# Patient Record
Sex: FEMALE | HISPANIC OR LATINO
[De-identification: names, ages, dates, MRNs, and addresses within clinical notes are randomized per-mention and may not be internally consistent; named-entity substitution may affect disease eponyms.]

---

## 2017-04-22 ENCOUNTER — HOSPITAL (OUTPATIENT)
Dept: OTHER | Age: 47
End: 2017-04-22
Attending: OBSTETRICS & GYNECOLOGY

## 2019-05-18 ENCOUNTER — HOSPITAL ENCOUNTER (EMERGENCY)
Facility: HOSPITAL | Age: 49
Discharge: HOME | End: 2019-05-18
Attending: EMERGENCY MEDICINE
Payer: COMMERCIAL

## 2019-05-18 ENCOUNTER — OFFICE VISIT (OUTPATIENT)
Dept: ENDOCRINOLOGY CLINIC | Facility: CLINIC | Age: 49
End: 2019-05-18
Payer: COMMERCIAL

## 2019-05-18 ENCOUNTER — APPOINTMENT (OUTPATIENT)
Dept: GENERAL RADIOLOGY | Facility: HOSPITAL | Age: 49
End: 2019-05-18
Attending: EMERGENCY MEDICINE
Payer: COMMERCIAL

## 2019-05-18 ENCOUNTER — TELEPHONE (OUTPATIENT)
Dept: ENDOCRINOLOGY CLINIC | Facility: CLINIC | Age: 49
End: 2019-05-18

## 2019-05-18 VITALS
DIASTOLIC BLOOD PRESSURE: 101 MMHG | SYSTOLIC BLOOD PRESSURE: 146 MMHG | HEART RATE: 108 BPM | BODY MASS INDEX: 36.8 KG/M2 | WEIGHT: 200 LBS | HEIGHT: 62 IN

## 2019-05-18 VITALS
RESPIRATION RATE: 18 BRPM | HEART RATE: 96 BPM | TEMPERATURE: 99 F | DIASTOLIC BLOOD PRESSURE: 101 MMHG | HEIGHT: 62 IN | WEIGHT: 200 LBS | SYSTOLIC BLOOD PRESSURE: 162 MMHG | BODY MASS INDEX: 36.8 KG/M2 | OXYGEN SATURATION: 97 %

## 2019-05-18 DIAGNOSIS — N30.90 CYSTITIS: ICD-10-CM

## 2019-05-18 DIAGNOSIS — Z79.4 TYPE 2 DIABETES MELLITUS WITH COMPLICATION, WITH LONG-TERM CURRENT USE OF INSULIN (HCC): Primary | ICD-10-CM

## 2019-05-18 DIAGNOSIS — I10 HYPERTENSION, UNSPECIFIED TYPE: ICD-10-CM

## 2019-05-18 DIAGNOSIS — R73.9 HYPERGLYCEMIA: Primary | ICD-10-CM

## 2019-05-18 DIAGNOSIS — E11.8 TYPE 2 DIABETES MELLITUS WITH COMPLICATION, WITH LONG-TERM CURRENT USE OF INSULIN (HCC): Primary | ICD-10-CM

## 2019-05-18 PROBLEM — E11.9 TYPE 2 DIABETES MELLITUS (HCC): Status: ACTIVE | Noted: 2019-05-18

## 2019-05-18 PROCEDURE — 96374 THER/PROPH/DIAG INJ IV PUSH: CPT

## 2019-05-18 PROCEDURE — 96361 HYDRATE IV INFUSION ADD-ON: CPT

## 2019-05-18 PROCEDURE — 82962 GLUCOSE BLOOD TEST: CPT | Performed by: INTERNAL MEDICINE

## 2019-05-18 PROCEDURE — 83036 HEMOGLOBIN GLYCOSYLATED A1C: CPT | Performed by: INTERNAL MEDICINE

## 2019-05-18 PROCEDURE — 87147 CULTURE TYPE IMMUNOLOGIC: CPT | Performed by: EMERGENCY MEDICINE

## 2019-05-18 PROCEDURE — 82962 GLUCOSE BLOOD TEST: CPT

## 2019-05-18 PROCEDURE — 96372 THER/PROPH/DIAG INJ SC/IM: CPT

## 2019-05-18 PROCEDURE — 80048 BASIC METABOLIC PNL TOTAL CA: CPT | Performed by: EMERGENCY MEDICINE

## 2019-05-18 PROCEDURE — 80076 HEPATIC FUNCTION PANEL: CPT | Performed by: EMERGENCY MEDICINE

## 2019-05-18 PROCEDURE — 99285 EMERGENCY DEPT VISIT HI MDM: CPT

## 2019-05-18 PROCEDURE — 81001 URINALYSIS AUTO W/SCOPE: CPT | Performed by: EMERGENCY MEDICINE

## 2019-05-18 PROCEDURE — 85025 COMPLETE CBC W/AUTO DIFF WBC: CPT | Performed by: EMERGENCY MEDICINE

## 2019-05-18 PROCEDURE — 36416 COLLJ CAPILLARY BLOOD SPEC: CPT | Performed by: INTERNAL MEDICINE

## 2019-05-18 PROCEDURE — 71045 X-RAY EXAM CHEST 1 VIEW: CPT | Performed by: EMERGENCY MEDICINE

## 2019-05-18 PROCEDURE — 87086 URINE CULTURE/COLONY COUNT: CPT | Performed by: EMERGENCY MEDICINE

## 2019-05-18 PROCEDURE — 99214 OFFICE O/P EST MOD 30 MIN: CPT | Performed by: INTERNAL MEDICINE

## 2019-05-18 RX ORDER — FUROSEMIDE 10 MG/ML
40 INJECTION INTRAMUSCULAR; INTRAVENOUS ONCE
Status: COMPLETED | OUTPATIENT
Start: 2019-05-18 | End: 2019-05-18

## 2019-05-18 RX ORDER — SODIUM CHLORIDE 9 MG/ML
125 INJECTION, SOLUTION INTRAVENOUS CONTINUOUS
Status: DISCONTINUED | OUTPATIENT
Start: 2019-05-18 | End: 2019-05-18

## 2019-05-18 RX ORDER — NITROFURANTOIN 25; 75 MG/1; MG/1
100 CAPSULE ORAL 2 TIMES DAILY
Qty: 10 CAPSULE | Refills: 0 | Status: SHIPPED | OUTPATIENT
Start: 2019-05-18 | End: 2019-05-23

## 2019-05-18 RX ORDER — INSULIN ASPART 100 [IU]/ML
0.2 INJECTION, SOLUTION INTRAVENOUS; SUBCUTANEOUS ONCE
Status: COMPLETED | OUTPATIENT
Start: 2019-05-18 | End: 2019-05-18

## 2019-05-18 RX ORDER — HYDROCHLOROTHIAZIDE 25 MG/1
25 TABLET ORAL DAILY
Qty: 30 TABLET | Refills: 0 | Status: SHIPPED | OUTPATIENT
Start: 2019-05-18 | End: 2019-06-15

## 2019-05-18 NOTE — ED PROVIDER NOTES
Patient Seen in: [facility] AND [facility] Emergency Department    History   Patient presents with:  Hyperglycemia (metabolic)    Stated Complaint: hyperglycemia    HPI    Patient is a 65-year-old female with a history of diabetes.   She states that she and her f Nose: Nose normal.   Mouth/Throat: Oropharynx is clear and moist.   Eyes: Conjunctivae and EOM are normal. Pupils are equal, round, and reactive to light. Neck: Neck supple.    Cardiovascular: Normal rate, regular rhythm, normal heart sounds and intact components:    AST 10 (*)     All other components within normal limits   CBC W/ DIFFERENTIAL - Abnormal; Notable for the following components:    RBC 5.38 (*)     HGB 16.4 (*)     All other components within normal limits   REDRAW DIRECT BILIRUBIN (P) - N

## 2019-05-18 NOTE — ED INITIAL ASSESSMENT (HPI)
Patient sent from endo md office for hyperglycemia. Patient complains of bilateral lower extremity swelling.   Blood sugar in MD office 480

## 2019-05-18 NOTE — H&P
New Patient Visit - Diabetes    CONSULT - Reason for Visit:  Diabetes management. Requesting Physician: Self referral    CHIEF COMPLAINT:  Patient presents with:  Diabetes: Type 2. Diagnosed 25 yrs ago. Has not seen Endo before. No PCP.  Recent swelling Frequency: Never      FAMILY HISTORY:   Family History   Problem Relation Age of Onset   • Diabetes Mother    • Heart Disorder Mother    • Diabetes Sister    • Stroke Sister    • Diabetes Brother        ASSESSMENTS:        REVIEW OF SYSTEMS:  Constitutiona understands the importance of glycemic control and the implications of uncontrolled diabetes including Diabetic ketoacidosis and various micro vascular and macrovascular complications. a).  Medications:    I had along discussion with the patient about MD

## 2019-05-20 NOTE — TELEPHONE ENCOUNTER
Glad to know that she is doing better. Let us c/w the same dose for now  Call with Bg on Friday  Based on BG, we will increase insulin or add glipizide or MTF based on how they are doing. Thanks.

## 2019-05-20 NOTE — TELEPHONE ENCOUNTER
Pt states she felt relief ever since leaving hospital. She states her ankles r less swollen \"normal this morning\"    5/20 Bf   5/19 Bf     Pt taking Novolin 70/30 ---> 50, 50 units BID.     RN reviewed w/ pt to take both doses of insulin and a

## 2019-05-31 ENCOUNTER — TELEPHONE (OUTPATIENT)
Dept: ENDOCRINOLOGY CLINIC | Facility: CLINIC | Age: 49
End: 2019-05-31

## 2019-05-31 RX ORDER — GLIPIZIDE 5 MG/1
5 TABLET ORAL
Qty: 30 TABLET | Refills: 2 | Status: SHIPPED | OUTPATIENT
Start: 2019-05-31 | End: 2019-06-15

## 2019-05-31 NOTE — TELEPHONE ENCOUNTER
Spoke with Ceci Hare and discussed message below from provider. She would like to try keeping insulin dose the same 50 units BID, and will add glipizide 5 mg with breakfast. Discussed risk of hypoglycemia when taking glipizide in addition to insulin.  She verbal

## 2019-05-31 NOTE — TELEPHONE ENCOUNTER
Two options, We can increase insulin 70/30 to 55 BID  OR  Keep same doses of insulin and add Glipizide 5 mg with BF only  Patient has financial limitations and glipizide is 4 dollars on [facility] list  I have discussed risk of hypoglycemia with this combinat

## 2019-05-31 NOTE — TELEPHONE ENCOUNTER
BGs 200-252. Checking twice daily. Does not have specific readings with her at work. Confirmed she is compliant with Novolin 70/30 vials 50 units BID.      Ankle's still swollen and painful but does state there is some improvement in symptoms since last off

## 2019-06-15 ENCOUNTER — APPOINTMENT (OUTPATIENT)
Dept: LAB | Facility: HOSPITAL | Age: 49
End: 2019-06-15
Attending: INTERNAL MEDICINE
Payer: COMMERCIAL

## 2019-06-15 ENCOUNTER — OFFICE VISIT (OUTPATIENT)
Dept: ENDOCRINOLOGY CLINIC | Facility: CLINIC | Age: 49
End: 2019-06-15
Payer: COMMERCIAL

## 2019-06-15 VITALS
HEART RATE: 99 BPM | SYSTOLIC BLOOD PRESSURE: 149 MMHG | HEIGHT: 62 IN | DIASTOLIC BLOOD PRESSURE: 93 MMHG | WEIGHT: 205.38 LBS | BODY MASS INDEX: 37.79 KG/M2

## 2019-06-15 DIAGNOSIS — Z79.4 TYPE 2 DIABETES MELLITUS WITH COMPLICATION, WITH LONG-TERM CURRENT USE OF INSULIN (HCC): Primary | ICD-10-CM

## 2019-06-15 DIAGNOSIS — Z79.4 TYPE 2 DIABETES MELLITUS WITH COMPLICATION, WITH LONG-TERM CURRENT USE OF INSULIN (HCC): ICD-10-CM

## 2019-06-15 DIAGNOSIS — Z13.220 LIPID SCREENING: ICD-10-CM

## 2019-06-15 DIAGNOSIS — E11.8 TYPE 2 DIABETES MELLITUS WITH COMPLICATION, WITH LONG-TERM CURRENT USE OF INSULIN (HCC): Primary | ICD-10-CM

## 2019-06-15 DIAGNOSIS — E11.8 TYPE 2 DIABETES MELLITUS WITH COMPLICATION, WITH LONG-TERM CURRENT USE OF INSULIN (HCC): ICD-10-CM

## 2019-06-15 PROCEDURE — 82962 GLUCOSE BLOOD TEST: CPT | Performed by: INTERNAL MEDICINE

## 2019-06-15 PROCEDURE — 82043 UR ALBUMIN QUANTITATIVE: CPT

## 2019-06-15 PROCEDURE — 36415 COLL VENOUS BLD VENIPUNCTURE: CPT

## 2019-06-15 PROCEDURE — 36416 COLLJ CAPILLARY BLOOD SPEC: CPT | Performed by: INTERNAL MEDICINE

## 2019-06-15 PROCEDURE — 83036 HEMOGLOBIN GLYCOSYLATED A1C: CPT | Performed by: INTERNAL MEDICINE

## 2019-06-15 PROCEDURE — 82570 ASSAY OF URINE CREATININE: CPT

## 2019-06-15 PROCEDURE — 80061 LIPID PANEL: CPT

## 2019-06-15 PROCEDURE — 80048 BASIC METABOLIC PNL TOTAL CA: CPT

## 2019-06-15 PROCEDURE — 99214 OFFICE O/P EST MOD 30 MIN: CPT | Performed by: INTERNAL MEDICINE

## 2019-06-15 RX ORDER — GLIPIZIDE 10 MG/1
10 TABLET ORAL
Qty: 180 TABLET | Refills: 0 | Status: SHIPPED | OUTPATIENT
Start: 2019-06-15 | End: 2019-10-05

## 2019-06-15 RX ORDER — HYDROCHLOROTHIAZIDE 25 MG/1
25 TABLET ORAL DAILY
Qty: 90 TABLET | Refills: 0 | Status: SHIPPED | OUTPATIENT
Start: 2019-06-15 | End: 2019-10-05

## 2019-06-15 NOTE — PROGRESS NOTES
Return Office Visit     CHIEF COMPLAINT:  Patient presents with:  Diabetes: LE swelling improved, checking BG twice daily       HISTORY OF PRESENT ILLNESS:  Jessica Friedman is a 54year old female who presents for follow up for  Type 2 DM.       DM HIS for:  chest pain, palpitations, orthopnea  GI: Negative for:  abdominal pain, nausea, vomiting, diarrhea, constipation, bleeding  Neurology: Negative for: headache, numbness, weakness  Genito-Urinary: Negative for: dysuria, frequency  Psychiatric: Negative explained  e). BG log maintainence explained in great detail, to get log and glucometer on next visit. f).  Life style changes: Diet: low carbohydrate diet discussed, Exercise: should target a weight loss of 7% and increase exercise to at least 150min a we

## 2019-06-17 ENCOUNTER — TELEPHONE (OUTPATIENT)
Dept: ENDOCRINOLOGY CLINIC | Facility: CLINIC | Age: 49
End: 2019-06-17

## 2019-06-17 RX ORDER — ATORVASTATIN CALCIUM 40 MG/1
40 TABLET, FILM COATED ORAL NIGHTLY
Qty: 30 TABLET | Refills: 3 | Status: SHIPPED | OUTPATIENT
Start: 2019-06-17

## 2019-06-17 NOTE — TELEPHONE ENCOUNTER
RN spoke with patient about note below from provider. Patient verbalized understanding and will start Atorvastatin. She states BGs are \"better\". Med ordered per AM written.

## 2019-06-17 NOTE — TELEPHONE ENCOUNTER
GFR is normal  UR MA is high, we will continue to work on BG control and BP control  Also, LDL is high. Low fat  Diet and start a statin. Atorvastatin 40 mg daily. Please discuss SE of muscle aches and effect on liver enzymes. How are her BG doing?   Pablo Bond

## 2019-10-07 RX ORDER — GLIPIZIDE 10 MG/1
TABLET ORAL
Qty: 180 TABLET | Refills: 0 | Status: SHIPPED | OUTPATIENT
Start: 2019-10-07

## 2019-10-07 RX ORDER — HYDROCHLOROTHIAZIDE 25 MG/1
TABLET ORAL
Qty: 90 TABLET | Refills: 0 | Status: SHIPPED | OUTPATIENT
Start: 2019-10-07

## 2023-11-22 ENCOUNTER — OFFICE VISIT (OUTPATIENT)
Facility: CLINIC | Age: 53
End: 2023-11-22
Payer: MEDICARE

## 2023-11-22 VITALS
HEART RATE: 91 BPM | HEIGHT: 62 IN | WEIGHT: 185 LBS | BODY MASS INDEX: 34.04 KG/M2 | DIASTOLIC BLOOD PRESSURE: 90 MMHG | OXYGEN SATURATION: 97 % | SYSTOLIC BLOOD PRESSURE: 160 MMHG

## 2023-11-22 DIAGNOSIS — Z79.4 TYPE 2 DIABETES MELLITUS WITH DIABETIC NEUROPATHY, WITH LONG-TERM CURRENT USE OF INSULIN (HCC): Primary | ICD-10-CM

## 2023-11-22 DIAGNOSIS — E11.40 TYPE 2 DIABETES MELLITUS WITH DIABETIC NEUROPATHY, WITH LONG-TERM CURRENT USE OF INSULIN (HCC): Primary | ICD-10-CM

## 2023-11-22 LAB
CARTRIDGE LOT#: ABNORMAL NUMERIC
HEMOGLOBIN A1C: 8.4 % (ref 4.3–5.6)

## 2023-11-22 PROCEDURE — 83036 HEMOGLOBIN GLYCOSYLATED A1C: CPT | Performed by: STUDENT IN AN ORGANIZED HEALTH CARE EDUCATION/TRAINING PROGRAM

## 2023-11-22 PROCEDURE — 99205 OFFICE O/P NEW HI 60 MIN: CPT | Performed by: STUDENT IN AN ORGANIZED HEALTH CARE EDUCATION/TRAINING PROGRAM

## 2023-11-22 NOTE — PATIENT INSTRUCTIONS
Return Visit   [ x ] Physician in 3 months   [  ] In person or video visit  [  ] In person only    [ x ] After visit summary   [ x ] Placed labs/imaging. Labs are to be drawn at [address] and fasting. [  ] Central scheduling # for ultrasound/nuclear med/CT/MRI/DXA  [  ] Directions to 1st floor lab  [  ] Dental clearance form  [  ] Will need authorization for outside records  [ X  ] Give blood sugar log  [ X ] Diabetes Education:    [ ] Carb Aware T2DM (60)   [ ] Carb count refresh T1DM (60)   [ X ] CGM start _____________ (30)   [ ] Pump 101 (57)   [ ] Schedule with Rony Vaz for ___________ (61)   [ ] DMBO (61)    It was great seeing you today! Today we discussed your diabetes:  -Please change your novolin to 65 units with breakfast and 35 units with dinner  -For now, continue to check your blood sugar fasting and prior to meals  -I will apply for a continuous glucose monitor and set you up to see Rony Vaz (she can help you administer the meter and set you up with the clinic)  -I also want you to get labs done. If you can get these done while fasting from midnight the night prior. Once all your labs result, I will keep you updated with next steps  -Your blood pressure was high today. I want you to establish care with a primary doctor.  I will also keep an eye out for your kidney function, and start you on a medication accordingly (either lisinopril, losartan, or thiazide)  -I want you to continue to follow with your eye doctor  -Based on your cholesterol results, I may restart your statin medication   -Lastly, once you have had the continuous meter on for 2 weeks, please send me a message and we can review your glucose readings  -I would like to see you back in 3 months     Take care!  -Dr. Sam Zaldivar

## 2023-11-22 NOTE — TELEPHONE ENCOUNTER
Per Dr. Marlon Cristobal, start process for CGM, preferably [facility]. Can do [facility] pharmacy order or try [facility] for DME. Routed back to Dr. Marlon Cristobal to see if preference.

## 2023-11-24 NOTE — TELEPHONE ENCOUNTER
We can try the order to the pharmacy and 3 would be great. Let me know if you need me to place any orders. Thank you!

## 2023-11-27 RX ORDER — BLOOD-GLUCOSE,RECEIVER,CONT
1 EACH MISCELLANEOUS AS DIRECTED
Qty: 1 EACH | Refills: 0 | Status: SHIPPED | OUTPATIENT
Start: 2023-11-27

## 2023-11-27 RX ORDER — BLOOD-GLUCOSE SENSOR
1 EACH MISCELLANEOUS
Qty: 2 EACH | Refills: 2 | Status: SHIPPED | OUTPATIENT
Start: 2023-11-27

## 2023-11-27 NOTE — TELEPHONE ENCOUNTER
Phoned patient, reviewed we will be sending orders for Claudia 3 to [place]- she chose [address] [place] [address] [address] in [place]. Reviewed may need a prior authorization, we will see if this goes through insurance, if too expensive through pharmacy, may have to go through DME still- patient verbalized understanding. Pended orders for sign off to Dr. Kaiser Lawrence.

## 2023-12-20 RX ORDER — HUMAN INSULIN 100 [USP'U]/ML
INJECTION, SUSPENSION SUBCUTANEOUS
COMMUNITY

## 2023-12-20 ASSESSMENT — ACTIVITIES OF DAILY LIVING (ADL)
SENSORY_SUPPORT_DEVICES: EYEGLASSES
MOBILITY_ASSIST_DEVICES: WHEELCHAIR;FOUR WHEEL WALKER
HISTORY OF FALLING IN THE LAST YEAR (PRIOR TO ADMISSION): NO

## 2023-12-21 ENCOUNTER — ANESTHESIA (OUTPATIENT)
Dept: SURGERY | Age: 53
End: 2023-12-21

## 2023-12-21 ENCOUNTER — ANESTHESIA EVENT (OUTPATIENT)
Dept: SURGERY | Age: 53
End: 2023-12-21

## 2023-12-21 ENCOUNTER — HOSPITAL ENCOUNTER (OUTPATIENT)
Age: 53
Discharge: HOME | End: 2023-12-21
Attending: SPECIALIST | Admitting: SPECIALIST

## 2023-12-21 LAB
GLUCOSE BLDC GLUCOMTR-MCNC: 130 MG/DL (ref 70–99)
HCG SERPL-ACNC: <2 MUNITS/ML

## 2023-12-21 PROCEDURE — 10002800 HB RX 250 W HCPCS: Performed by: SPECIALIST

## 2023-12-21 PROCEDURE — 13000036 HB COMPLEX  CASE S/U + 1ST 15 MIN: Performed by: SPECIALIST

## 2023-12-21 PROCEDURE — 10002803 HB RX 637: Performed by: SPECIALIST

## 2023-12-21 PROCEDURE — 84702 CHORIONIC GONADOTROPIN TEST: CPT | Performed by: SPECIALIST

## 2023-12-21 PROCEDURE — 10002807 HB RX 258: Performed by: SPECIALIST

## 2023-12-21 PROCEDURE — 13000006 HB ANESTHESIA MAC S/U + 1ST 15 MIN: Performed by: SPECIALIST

## 2023-12-21 PROCEDURE — 10002800 HB RX 250 W HCPCS: Performed by: NURSE ANESTHETIST, CERTIFIED REGISTERED

## 2023-12-21 PROCEDURE — 10002801 HB RX 250 W/O HCPCS: Performed by: SPECIALIST

## 2023-12-21 PROCEDURE — 10002803 HB RX 637

## 2023-12-21 PROCEDURE — 10006023 HB SUPPLY 272: Performed by: SPECIALIST

## 2023-12-21 PROCEDURE — 13000007 HB ANESTHESIA MAC EA ADD MINUTE: Performed by: SPECIALIST

## 2023-12-21 PROCEDURE — 82962 GLUCOSE BLOOD TEST: CPT

## 2023-12-21 PROCEDURE — 10002801 HB RX 250 W/O HCPCS: Performed by: NURSE ANESTHETIST, CERTIFIED REGISTERED

## 2023-12-21 PROCEDURE — 13000001 HB PHASE II RECOVERY EA 30 MINUTES: Performed by: SPECIALIST

## 2023-12-21 PROCEDURE — 13000037 HB COMPLEX CASE EACH ADD MINUTE: Performed by: SPECIALIST

## 2023-12-21 RX ORDER — LIDOCAINE HYDROCHLORIDE 20 MG/ML
INJECTION, SOLUTION INFILTRATION; PERINEURAL PRN
Status: DISCONTINUED | OUTPATIENT
Start: 2023-12-21 | End: 2023-12-21

## 2023-12-21 RX ORDER — PHENYLEPHRINE HYDROCHLORIDE 25 MG/ML
1 SOLUTION/ DROPS OPHTHALMIC
Status: COMPLETED | OUTPATIENT
Start: 2023-12-21 | End: 2023-12-21

## 2023-12-21 RX ORDER — TROPICAMIDE 10 MG/ML
1 SOLUTION/ DROPS OPHTHALMIC
Status: COMPLETED | OUTPATIENT
Start: 2023-12-21 | End: 2023-12-21

## 2023-12-21 RX ORDER — CYCLOPENTOLATE HYDROCHLORIDE 10 MG/ML
1 SOLUTION/ DROPS OPHTHALMIC
Status: COMPLETED | OUTPATIENT
Start: 2023-12-21 | End: 2023-12-21

## 2023-12-21 RX ORDER — CHLORHEXIDINE GLUCONATE ORAL RINSE 1.2 MG/ML
15 SOLUTION DENTAL ONCE
Status: COMPLETED | OUTPATIENT
Start: 2023-12-21 | End: 2023-12-21

## 2023-12-21 RX ORDER — SODIUM CHLORIDE 9 MG/ML
INJECTION, SOLUTION INTRAVENOUS CONTINUOUS
Status: DISCONTINUED | OUTPATIENT
Start: 2023-12-21 | End: 2023-12-21 | Stop reason: HOSPADM

## 2023-12-21 RX ORDER — PROPOFOL 10 MG/ML
INJECTION, EMULSION INTRAVENOUS PRN
Status: DISCONTINUED | OUTPATIENT
Start: 2023-12-21 | End: 2023-12-21

## 2023-12-21 RX ORDER — HYDRALAZINE HYDROCHLORIDE 20 MG/ML
INJECTION INTRAMUSCULAR; INTRAVENOUS PRN
Status: DISCONTINUED | OUTPATIENT
Start: 2023-12-21 | End: 2023-12-21

## 2023-12-21 RX ADMIN — CYCLOPENTOLATE HYDROCHLORIDE 1 DROP: 10 SOLUTION/ DROPS OPHTHALMIC at 08:41

## 2023-12-21 RX ADMIN — CHLORHEXIDINE GLUCONATE 15 ML: 1.2 RINSE ORAL at 08:50

## 2023-12-21 RX ADMIN — CYCLOPENTOLATE HYDROCHLORIDE 1 DROP: 10 SOLUTION/ DROPS OPHTHALMIC at 08:35

## 2023-12-21 RX ADMIN — PHENYLEPHRINE HYDROCHLORIDE 1 DROP: 25 SOLUTION/ DROPS OPHTHALMIC at 08:49

## 2023-12-21 RX ADMIN — TROPICAMIDE 1 DROP: 10 SOLUTION/ DROPS OPHTHALMIC at 08:49

## 2023-12-21 RX ADMIN — SODIUM CHLORIDE: 9 INJECTION, SOLUTION INTRAVENOUS at 08:53

## 2023-12-21 RX ADMIN — PHENYLEPHRINE HYDROCHLORIDE 1 DROP: 25 SOLUTION/ DROPS OPHTHALMIC at 08:35

## 2023-12-21 RX ADMIN — HYDRALAZINE HYDROCHLORIDE 10 MG: 20 INJECTION, SOLUTION INTRAMUSCULAR; INTRAVENOUS at 10:13

## 2023-12-21 RX ADMIN — TROPICAMIDE 1 DROP: 10 SOLUTION/ DROPS OPHTHALMIC at 08:41

## 2023-12-21 RX ADMIN — PROPOFOL 30 MG: 10 INJECTION, EMULSION INTRAVENOUS at 10:02

## 2023-12-21 RX ADMIN — PHENYLEPHRINE HYDROCHLORIDE 1 DROP: 25 SOLUTION/ DROPS OPHTHALMIC at 08:41

## 2023-12-21 RX ADMIN — TROPICAMIDE 1 DROP: 10 SOLUTION/ DROPS OPHTHALMIC at 08:35

## 2023-12-21 RX ADMIN — CYCLOPENTOLATE HYDROCHLORIDE 1 DROP: 10 SOLUTION/ DROPS OPHTHALMIC at 08:49

## 2023-12-21 RX ADMIN — LIDOCAINE HYDROCHLORIDE 60 MG: 20 INJECTION, SOLUTION INFILTRATION; PERINEURAL at 10:02

## 2023-12-21 SDOH — SOCIAL STABILITY: SOCIAL INSECURITY: RISK FACTORS: BMI> 30 (OBESITY)

## 2023-12-21 ASSESSMENT — ACTIVITIES OF DAILY LIVING (ADL)
CONTINENCE: INDEPENDENT
HISTORY OF FALLING IN THE LAST YEAR (PRIOR TO ADMISSION): NO
ADL_SHORT_OF_BREATH: NO
RECENT_DECLINE_ADL: NO
TOILETING: INDEPENDENT
BATHING: NEEDS ASSISTANCE
FEEDING: INDEPENDENT
ADL_BEFORE_ADMISSION: NEEDS/REQUIRES ASSISTANCE
TRANSFERRING: INDEPENDENT
DRESSING: NEEDS ASSISTANCE
CHRONIC_PAIN_PRESENT: NO
SENSORY_SUPPORT_DEVICES: EYEGLASSES
ADL_SCORE: 10
MOBILITY_ASSIST_DEVICES: WHEELCHAIR;STANDARD WALKER

## 2023-12-21 ASSESSMENT — PAIN SCALES - GENERAL
PAINLEVEL_OUTOF10: 0

## 2023-12-21 ASSESSMENT — COGNITIVE AND FUNCTIONAL STATUS - GENERAL
ARE YOU DEAF OR DO YOU HAVE SERIOUS DIFFICULTY  HEARING: NO
ARE YOU BLIND OR DO YOU HAVE SERIOUS DIFFICULTY SEEING, EVEN WHEN WEARING GLASSES: NO

## 2023-12-21 ASSESSMENT — ENCOUNTER SYMPTOMS: EXERCISE TOLERANCE: GOOD (>4 METS)

## 2023-12-22 VITALS
RESPIRATION RATE: 19 BRPM | TEMPERATURE: 97.9 F | WEIGHT: 222 LBS | HEIGHT: 62 IN | DIASTOLIC BLOOD PRESSURE: 78 MMHG | BODY MASS INDEX: 40.85 KG/M2 | OXYGEN SATURATION: 96 % | HEART RATE: 88 BPM | SYSTOLIC BLOOD PRESSURE: 148 MMHG

## 2024-02-19 ENCOUNTER — OFFICE VISIT (OUTPATIENT)
Facility: CLINIC | Age: 54
End: 2024-02-19
Payer: MEDICARE

## 2024-02-19 ENCOUNTER — LAB ENCOUNTER (OUTPATIENT)
Dept: LAB | Age: 54
End: 2024-02-19
Attending: STUDENT IN AN ORGANIZED HEALTH CARE EDUCATION/TRAINING PROGRAM
Payer: MEDICARE

## 2024-02-19 VITALS
HEART RATE: 88 BPM | DIASTOLIC BLOOD PRESSURE: 84 MMHG | OXYGEN SATURATION: 97 % | RESPIRATION RATE: 16 BRPM | SYSTOLIC BLOOD PRESSURE: 128 MMHG

## 2024-02-19 DIAGNOSIS — E78.5 HYPERLIPIDEMIA, UNSPECIFIED HYPERLIPIDEMIA TYPE: ICD-10-CM

## 2024-02-19 DIAGNOSIS — Z79.4 TYPE 2 DIABETES MELLITUS WITH DIABETIC NEUROPATHY, WITH LONG-TERM CURRENT USE OF INSULIN (HCC): ICD-10-CM

## 2024-02-19 DIAGNOSIS — I10 HYPERTENSION, UNSPECIFIED TYPE: ICD-10-CM

## 2024-02-19 DIAGNOSIS — E11.40 TYPE 2 DIABETES MELLITUS WITH DIABETIC NEUROPATHY, WITH LONG-TERM CURRENT USE OF INSULIN (HCC): Primary | ICD-10-CM

## 2024-02-19 DIAGNOSIS — E11.40 TYPE 2 DIABETES MELLITUS WITH DIABETIC NEUROPATHY, WITH LONG-TERM CURRENT USE OF INSULIN (HCC): ICD-10-CM

## 2024-02-19 DIAGNOSIS — Z79.4 TYPE 2 DIABETES MELLITUS WITH DIABETIC NEUROPATHY, WITH LONG-TERM CURRENT USE OF INSULIN (HCC): Primary | ICD-10-CM

## 2024-02-19 LAB
ALBUMIN SERPL-MCNC: 3 G/DL (ref 3.4–5)
ALBUMIN/GLOB SERPL: 0.6 {RATIO} (ref 1–2)
ALP LIVER SERPL-CCNC: 111 U/L
ALT SERPL-CCNC: 9 U/L
ANION GAP SERPL CALC-SCNC: 6 MMOL/L (ref 0–18)
AST SERPL-CCNC: 12 U/L (ref 15–37)
BILIRUB SERPL-MCNC: 0.4 MG/DL (ref 0.1–2)
BUN BLD-MCNC: 28 MG/DL (ref 9–23)
CALCIUM BLD-MCNC: 8.2 MG/DL (ref 8.5–10.1)
CHLORIDE SERPL-SCNC: 112 MMOL/L (ref 98–112)
CHOLEST SERPL-MCNC: 186 MG/DL (ref ?–200)
CO2 SERPL-SCNC: 23 MMOL/L (ref 21–32)
CREAT BLD-MCNC: 3.2 MG/DL
EGFRCR SERPLBLD CKD-EPI 2021: 17 ML/MIN/1.73M2 (ref 60–?)
FASTING PATIENT LIPID ANSWER: YES
FASTING STATUS PATIENT QL REPORTED: YES
GLOBULIN PLAS-MCNC: 4.8 G/DL (ref 2.8–4.4)
GLUCOSE BLD-MCNC: 98 MG/DL (ref 70–99)
HDLC SERPL-MCNC: 44 MG/DL (ref 40–59)
LDLC SERPL CALC-MCNC: 124 MG/DL (ref ?–100)
NONHDLC SERPL-MCNC: 142 MG/DL (ref ?–130)
OSMOLALITY SERPL CALC.SUM OF ELEC: 297 MOSM/KG (ref 275–295)
POTASSIUM SERPL-SCNC: 3.3 MMOL/L (ref 3.5–5.1)
PROT SERPL-MCNC: 7.8 G/DL (ref 6.4–8.2)
SODIUM SERPL-SCNC: 141 MMOL/L (ref 136–145)
T4 FREE SERPL-MCNC: 0.8 NG/DL (ref 0.8–1.7)
TRIGL SERPL-MCNC: 100 MG/DL (ref 30–149)
TSI SER-ACNC: 11.1 MIU/ML (ref 0.36–3.74)
VLDLC SERPL CALC-MCNC: 18 MG/DL (ref 0–30)

## 2024-02-19 PROCEDURE — 84443 ASSAY THYROID STIM HORMONE: CPT

## 2024-02-19 PROCEDURE — 84681 ASSAY OF C-PEPTIDE: CPT

## 2024-02-19 PROCEDURE — 80053 COMPREHEN METABOLIC PANEL: CPT

## 2024-02-19 PROCEDURE — 84439 ASSAY OF FREE THYROXINE: CPT

## 2024-02-19 PROCEDURE — 80061 LIPID PANEL: CPT

## 2024-02-19 PROCEDURE — 99215 OFFICE O/P EST HI 40 MIN: CPT | Performed by: STUDENT IN AN ORGANIZED HEALTH CARE EDUCATION/TRAINING PROGRAM

## 2024-02-19 PROCEDURE — 36415 COLL VENOUS BLD VENIPUNCTURE: CPT

## 2024-02-19 RX ORDER — HYDROCHLOROTHIAZIDE 25 MG/1
25 TABLET ORAL DAILY
Qty: 90 TABLET | Refills: 0 | Status: SHIPPED | OUTPATIENT
Start: 2024-02-19 | End: 2024-05-19

## 2024-02-19 NOTE — PATIENT INSTRUCTIONS
Return Visit   [ x ] Physician in 6 months   [  ] In person or video visit  [  ] In person only    [ x ] After visit summary   [ x ] Placed labs/imaging.   (A1c)   [ x ] Diabetes Education:    [ ] Carb Aware T2DM (60)   [ ] Carb count refresh T1DM (60)   [ ] CGM start _____________ (30)   [ ] Pump 101 (60)   [X  ] Schedule with Rodriguez for review darío/check in  (60)   [ ] DMBO (60)    It was great seeing you today!    Today we discussed your diabetes and hypertension:  -Please continue Novolin 50 units prior to meals twice a day  -We reviewed your darío trends  -Once your labs result, I will discuss next steps regarding ozempic, metformin, and/or jardiance (you do have a hx of urinary frequency) pending insurance coverage  -I have also started hydrochlorothiazide for high blood pressure. Please try to establish care with a primary physician. I had given you a list but let me know if you need me to resend it     -Dr. House

## 2024-02-19 NOTE — PROGRESS NOTES
ENDOCRINOLOGY, DIABETES & METABOLISM    Name: Ceci Carroll  MR: [id number]  Date: 11/22/23   Referring Physician: No ref. provider found    Subjective    HISTORY OF PRESENT ILLNESS:  Ceci Carroll is a 53 year oldF  seen in consultation for her Type 2 Diabetes.    Initial HPI 11/2023  Diabetes was diagnosed at age 27.  Initially started on Metformin. She did endorse noncompliance at that time. She was started on insulin around her early 30s.   Has tried lantus, novolog. Currently on Novolin 70/30 50u BID  In 2015, she had a bad infection and stopped all her meds at that time; was hospitalized. Restarted therapy after discharge.  In 2019, A1c of 15% and noted slower gait and weakness. Diagnosed with groin infection and gangrenous GB. States she was very sick and after that she became very serious about her diabetes management.   She noted worsening neuropathy after 2019. Her A1c was 6.1% in 2022 while on Novolin. Made significant lifestyle changes and was compliant with medications.  Today, A1c POC is 8.4%  Uses wheelchair and roller to get around; feels she is getting stronger in terms of her right drop foot. Does not drive  Family hx: mother with DM, brother with DM (passed away), sister with DM  Polyuria/polydipsia:  No Going every 4-5 hours (which is better than previously)  Blurred vision:  Yes:        Diabetes Treatment history   Duration of therapy with insulin: since age 32   Metformin: not compliant  Lantus/Humalog: too expensive      Current Regimen for diabetes:   Oral/Injectable medications: denies   Basal:  Novolin 50u BID . Injects in abdomen  Lantus/novolog was more expensive   Prandial:  denies  Missed doses: endorses compliance    2/2024  Noted glucose ranging from 300 to 50s on darío; started on darío around 12/2023  Has had a few sensors that have been faulty   Noted initially was taking novolin split dosing after eating and then switched to 50 BID and prior to meal dosing and noted  numbers improved    She takes her novolin around 730/8A and 4P  Denies bruising, abdominal striae, excessive weight gain, proximal muscle weakness     Complications  Neuropathy: Yes: drop foot Follows with neurology: No    Nephropathy: Yes: elevated malb 4/2022 Last MCR: >2000 4/2022 Follows with nephrology: No   Retinopathy: Yes: retinopathy Last Ophthalmology Visit: 2/2024 hx of cataract and recommended cataract surgery      CAD/ HLD: Yes:   On Statin: Previously on atorvastatin; has not been on it for 4-5 months     Hypertension: Yes:    On ACEi: Self restarted on Losartan 50 mg but having cough; previously on hydrochlorothiazide.    Stroke/TIA: No On Aspirin: No    Skin Ulcer/Infection: No     Follows with Podiatry: No Last foot exam: Foot/Ankle Musculoskeletal Exam  Last test 2023 with home care and she had no feeling           Meal                         Recall                                             Breakfastbagel +butter/cream cheese, sandwich  AM Snackx  Lunch           x (maybe nuts)  PM Snackx  Dinnersalad/chicken     Snack Before Bed? No   Drinks water predominantly but does have lemonade   Number of restaurant or fast food meals/week:4-5x/week (eats salad or pizza)   Exercise:  in wheelchair     Potential CI to medications:  GERD: No  UTI/urinary frequency:Yes:    H/o Pancreatitis: No  FHx of Thyroid cancer: No  CAD/CHF: No; family hx of MI    ALLERGIES, PAST MEDICAL HISTORY, SOCIAL HISTORY, FAMILY HISTORY reviewed and updated in [facility] as appropriate February 19, 2024    CURRENT MEDICATIONS:   No outpatient medications have been marked as taking for the 2/19/24 encounter (Office Visit) with Chayo House DO.         REVIEW OF SYSTEMS: Pertinent positives documented above in HPIObjective      PHYSICAL EXAMINATION  Wt Readings from Last 3 Encounters:   11/22/23 185 lb (83.9 kg)   06/15/19 205 lb 6.4 oz (93.2 kg)   05/18/19 200 lb (90.7 kg)      BMI Readings from Last 3 Encounters:   11/22/23 33.84  kg/m²   06/15/19 37.57 kg/m²   05/18/19 36.58 kg/m²     Vital Signs:   Vitals:    02/19/24 1010   BP: 128/84   Pulse: 88   Resp: 16   SpO2: 97%     There is no height or weight on file to calculate BMI.   General Appearance:  Alert, in no acute distress, well developed, pleasant lady with abdominal adiposity, sitting in wheel chair   Eyes:  normal conjunctivae, sclera  Ears/Nose/Mouth/Throat/Neck:  normal hearing, normal speech and no thyromegaly  Respiratory:  breathing comfortably on room air, clear to auscultation bilaterally  Cardiovascular:  regular rate and rhythm, no murmurs, S3 or S4, b/l peripheral edema  Psychiatric:  Oriented to person, place and time, appropriate mood & affect  Skin: Normal moisture and skin texture. No bruising  Neuro: sensory grossly intact, motor grossly intact. normal gait.    Recent Labs: Independently reviewed labs on February 19, 2024 in [facility] under lab tab.  Interpretation: Elevated A1c above goal.  Goal of 6.5 to 7%.  Diabetic Labs:   Last A1c value was 8.4% done 11/22/2023.  Component      Latest Ref Rng 11/22/2023   HEMOGLOBIN A1C      4.3 - 5.6 % 8.4 !       4/2022 >2000 microalbumin    Diabetic Health Maintenance:  2/2024 eye exam, hx of retinopathy  2023 foot exam without sensation      Glucose Log Review   Claudia reviewed in visit; no specific pattern     Radiology:  Independently interpreted testing on February 19, 2024  I reviewed the patient's records from outside facility using :  12/2014 CT A/P WO Con Pancreas unremarkable. No adrenal mass.     ASSESSMENT & PLAN  Ceci Carroll is a 53 year oldF  seen in consultation for:    Uncontrolled Type 2 Diabetes  She was diagnosed at age 27 and states this was with a diagnosis of type 2 diabetes.  Initially was on metformin and then ultimately started on insulin due to elevation in A1c and noncompliance with therapy.  She has nephropathy, retinopathy, neuropathy from her diabetes  Current regimen includes Novolin 70/30 50  units at 8 AM and 50 units at 4 PM.  Previously on metformin but has not been taking this currently. Previously also discusses splitting dose to 65 units in the AM and 35 units in the afternoon, but patient noted variable glucose readings  A1c above goal at 8.4% 11/2023  We discussed the importance of better glucose control in preventing progression of endorgan damage, as well as, goals of therapy and clinical significance of her A1c   We will repeat surveillance labs now as well as a fasting C-peptide level  Patient to establish care with PCP and continue to follow with optho   Patient to follow up with Karlie for a glucose check in and to explore other options such as GLP-1, restarting metformin as additional therapy based on cost and what is affordable for patient  Surveillance for Complications & Risks  oHgA1c (goal <7%): No 8.4% 11/2023  oRenal: microalbumin: Positive, 4/2022  oBlood Pressure (goal <130/80):  No  oOptho: Eye screening (yearly): Yes, 2/2024   oNeuro: Encouraged proper footwear and regular visits with podiatry. Foot exam (yearly): Yes, summer of 2023  oCV: hx of HLD -> LDL 99 (goal: LDL<100),  on 3/2022   ?Taking ASA(eg, age >40, cigarette smoking, HTN, obesity, albuminuria, HLD, or a FHx of CAD) : No     Hypertension  Blood pressure not at goal this visit.  Patient self restarted Losartan, however,  notes patient has been coughing.  Per review, hydrochlorothiazide was on her medication list.  Discussed starting hydrochlorothiazide. Patient to also establish care with primary care doctor. List given during last visit.     Hyperlipidemia  Patient previously taking atorvastatin.  She is not currently on any cholesterol medications.  Last lipid panel with LDL of 99 3/3/2022.  Will repeat now and start statin accordingly       Goals: Promote healthy eating, routine exercise/activity.   1. Patient aware of the adverse effects of suboptimal glucose control and goals of therapy  2. Reinforced  timing and compliance with medication, SMBG and routine follow up       The above assessment and plan was discussed with the patient. The patient noted understanding and agreement with the plan listed above. The patient is aware to contact the office if further concerns arise.  I have reviewed prior external note(s) from each unique source (PCP and other specialists), reviewed results/ordered further testing.     Visit Duration : A total of 40 minutes was spent today on obtaining history, reviewing pertinent labs, evaluating patient, providing multiple treatment options, reinforcing diet/exercise and compliance, and completing documentation.     Note to patient: The 21 Century Cures Act makes medical notes like these available to patients in the interest of transparency. However, be advised this is a medical document. It is intended as peer to peer communication. It is written in medical language and may contain abbreviations or verbiage that are unfamiliar. It may appear blunt or direct. Medical documents are intended to carry relevant information, facts as evident, and the clinical opinion of the practitioner.    Chayo House DO  Endocrinology, Diabetes & Metabolism   2/19/2024

## 2024-02-20 DIAGNOSIS — E03.9 HYPOTHYROIDISM, UNSPECIFIED TYPE: Primary | ICD-10-CM

## 2024-02-20 DIAGNOSIS — E78.5 HYPERLIPIDEMIA, UNSPECIFIED HYPERLIPIDEMIA TYPE: ICD-10-CM

## 2024-02-20 LAB — C-PEPTIDE: 3.9 NG/ML

## 2024-02-20 NOTE — TELEPHONE ENCOUNTER
Return the emergency department for any worsening chest pain, worsening shortness of breath, leg pain or swelling, or any other concerning symptoms.  You may use Tylenol and ibuprofen at home for pain.   Spoke with patient on telephone and reviewed results from labs dated 2/19/24, she verbalized understanding of all. She is agreeable to starting both Levothyroxine and Atorvastatin. Reviewed to take 1200mg of calcium daily- from diet/supplement.     Patient has f/u already scheduled on 5/20/24. Reviewed to have repeat labs done prior to this appointment- states will do.    Confirmed pharmacy to send prescriptions. Pended prescriptions and repeat labs to Dr. House for review/sign off.    Patient also asked to schedule appointment with Jennifer, Diabetic Educator. Scheduled for 3/20/24 at 10am.     Kuznech message also sent to patient by request- reviewing above.

## 2024-02-20 NOTE — TELEPHONE ENCOUNTER
I did recommend scheduling with Jennifer for a darío review/check in but the more I think about it, she might be better suited for Karlie. I did briefly discuss with Karlie regarding seeing this patient as well. Is it possible for her to see Karlie instead of Jennifer? I will sign the rest of these labs/orders. Thank you!

## 2024-02-21 RX ORDER — ATORVASTATIN CALCIUM 40 MG/1
40 TABLET, FILM COATED ORAL NIGHTLY
Qty: 90 TABLET | Refills: 0 | Status: SHIPPED | OUTPATIENT
Start: 2024-02-21

## 2024-02-21 RX ORDER — LEVOTHYROXINE SODIUM 0.05 MG/1
50 TABLET ORAL
Qty: 90 TABLET | Refills: 0 | Status: SHIPPED | OUTPATIENT
Start: 2024-02-21

## 2024-02-21 NOTE — TELEPHONE ENCOUNTER
Phoned patient and changed appointment with Jennifer to appointment with Karlie PERRY- same day 3/20/24 at 10:15am.

## 2024-03-04 ENCOUNTER — TELEPHONE (OUTPATIENT)
Facility: CLINIC | Age: 54
End: 2024-03-04

## 2024-03-04 NOTE — TELEPHONE ENCOUNTER
3/4/24 rcvd via fax from [facility]     Diabetic Eye Exam    Plalaced in Dr. House's in-box   Kylie Holder(Attending)

## 2024-03-06 ENCOUNTER — MED REC SCAN ONLY (OUTPATIENT)
Facility: CLINIC | Age: 54
End: 2024-03-06

## 2024-03-20 ENCOUNTER — PATIENT MESSAGE (OUTPATIENT)
Facility: CLINIC | Age: 54
End: 2024-03-20

## 2024-03-20 ENCOUNTER — TELEMEDICINE (OUTPATIENT)
Facility: CLINIC | Age: 54
End: 2024-03-20
Payer: MEDICARE

## 2024-03-20 ENCOUNTER — TELEPHONE (OUTPATIENT)
Facility: CLINIC | Age: 54
End: 2024-03-20

## 2024-03-20 DIAGNOSIS — E11.40 TYPE 2 DIABETES MELLITUS WITH DIABETIC NEUROPATHY, WITH LONG-TERM CURRENT USE OF INSULIN (HCC): Primary | ICD-10-CM

## 2024-03-20 DIAGNOSIS — Z79.4 TYPE 2 DIABETES MELLITUS WITH DIABETIC NEUROPATHY, WITH LONG-TERM CURRENT USE OF INSULIN (HCC): Primary | ICD-10-CM

## 2024-03-20 DIAGNOSIS — E03.9 HYPOTHYROIDISM, UNSPECIFIED TYPE: ICD-10-CM

## 2024-03-20 DIAGNOSIS — N18.4 CKD (CHRONIC KIDNEY DISEASE) STAGE 4, GFR 15-29 ML/MIN (HCC): ICD-10-CM

## 2024-03-20 DIAGNOSIS — E78.5 HYPERLIPIDEMIA, UNSPECIFIED HYPERLIPIDEMIA TYPE: ICD-10-CM

## 2024-03-20 DIAGNOSIS — R80.9 MICROALBUMINURIA: ICD-10-CM

## 2024-03-20 PROCEDURE — 99215 OFFICE O/P EST HI 40 MIN: CPT

## 2024-03-20 RX ORDER — ATORVASTATIN CALCIUM 40 MG/1
40 TABLET, FILM COATED ORAL NIGHTLY
Qty: 90 TABLET | Refills: 0 | Status: SHIPPED | OUTPATIENT
Start: 2024-03-20

## 2024-03-20 RX ORDER — LEVOTHYROXINE SODIUM 0.05 MG/1
50 TABLET ORAL
Qty: 90 TABLET | Refills: 0 | Status: SHIPPED | OUTPATIENT
Start: 2024-03-20

## 2024-03-20 NOTE — TELEPHONE ENCOUNTER
From: Ceci Carroll  To: Chayo House  Sent: 3/20/2024 11:13 AM CDT  Subject: Surgery clearence form    I'm scheduled for surgery on april16 can you fill out this form for clearance Thank you

## 2024-03-20 NOTE — PATIENT INSTRUCTIONS
- primary care doctor- Dr. Vu, Dr. Nolasco, Dr. Mccarthy, please establish care regarding the blood pressure  - [email or web address]  - Phone number: [phone]    Follow up plan:  With ORLANDO Alejo: No follow-ups on file.      Medications:   - continue insulin 70/30 50u twice daily for now  - continue claudia 3 with reader    Blood sugar targets:  Before breakfast: , 2 hours after meals: <180 (preferably <150), A1C goal: <7.0%  Time in Range goal is higher than 80% if using a continuous glucose monitor (Dexcom or Claudia).  Time in Range can be found within the Dexcom G7 lino, on Clarity if using Dexcom G6, or on LibreView if using Claudia.    HOW TO TREAT LOW BLOOD SUGAR (Hypoglycemia)  Low blood sugar= Less than 70, or if you start to have symptoms (below)  Symptoms: Shaking or trembling, fast heart rate, extreme hunger, sweating, confusion/difficulty concentrating, dizziness.    How to treat a low blood sugar if you are able to eat/drink: The Rule of 15  If you are using continuous glucose monitor that says you are low, but you do not have any symptoms, verify on fingerstick that your blood sugar is actually low before treating.   Eat 15 grams of carbs (see examples below)  Check your blood sugar after 15 minutes. If it’s still below your target range, have another serving.   Repeat these steps until it’s in your target range. Once it’s in range, if you're nervous about your sugar going low again, have a protein source (ie, a spoonful of peanut butter).   If you have a CGM you want to look for how your arrow has changed. If you arrow is pointed up or sideways after 15 min, give your CGM more time OR check with a finger stick. Try not to eat more food until at least 15 min after the first BG check - otherwise you risk having a rebound high.  If you are experiencing symptoms and you are unable to check your blood glucose for any reason,  treat the hypoglycemia.  If someone has a low blood sugar and is unconscious: Don’t hesitate to call 911. If someone is unconscious and glucagon is not available or someone does not know how to use it, call 911 immediately.    To treat a low, I recommend you carry with you easy, pre-portioned treatment for low blood sugars that are 15G of carbs:   - Children sized squeeze pouch applesauce (high fiber + carbs help prevent too high of a spike)  - Small children's sized juicebox- 15g carb --> 4oz juice box  - Glucose tablets from [employer], you can find them near diabetes supplies --> Note, you will need to eat 3-4 tablets to get to 15g of carbs  - Children sized fruit snack pack- look for one with 15 grams of total carbohydrate  - Choice of how to treat your low is important. Complex carbs, or foods that contain fats along with carbs (like chocolate) can slow the absorption of glucose and should NOT be used to treat an emergency low    NAVIGATING INSURANCE / PRICING OF MEDICATIONS WITH DIABETES:  Diabetes medications and supplies can be costly. It is your responsibility to contact your insurance company if there is an issue with pricing and then contact the office with what medications are covered.  The best way to reduce costs relating to your diabetes is to contact your insurance at the start of the year directly.  If you have questions about the pricing of any of your diabetes supplies or medications, please reach out directly to your insurance pharmacy plan (phone number on the back of your card), or look online at their website and their 'formulary'.   Here are some questions you can ask your insurance that may help you identify areas to save on costs:    1.    What is the formulary preferred brand of my medication / supply (ie: my doctor ordered Ozempic, is that preferred or is another agent preferred)? Do you prefer a generic over brand name?  2.    What quantity is preferred by my plan? (30 or 90 day  supply)  3.    Will my price be lower if I use a specific local or mail order pharmacy? (For example, [facility] or [facility] is sometimes preferred, or a mail order service like [facility] or [facility])  4.    What is my estimated pricing for these medications / supplies if all rules are followed?  5.     Do I have a deductible I have to meet before my medications will be fully covered? What is my deductible? (In that case, you often will pay full price until you reach your deductible)    For CGM's (Dexcom or Claudia) specifically, if your pharmacy plan states that they do NOT offer coverage/there is a high copay at your pharmacy, ask to be transferred to your plans Durable Medical Equipment team, or to be give a direct contact number for that team if they cannot transfer.  Your plan may prefer that your CGM goes through a medical supplier rather than pharmacy coverage.  If this is the case, please ask the following questions:    1.    Which durable medical equipment suppliers do you have a contract with for CGMs specifically (not all DME's supply CGMs!)  2.    What is the estimated pricing for my CGM supplies through medical coverage?    Diabetes annual care reminders:  - If you haven't, remember to complete a yearly diabetes eye exam. This exam is critical to preventing and treating eye conditions caused by diabetes that could potentially lead to vision loss. Once complete, please call your eye care provider and ask them to fax your latest report to our office. This will help us update our records. Our fax number is: [phone]  - If you ever have an upcoming surgery, please notify the office. We will make adjustments to your diabetes medications prior to the procedure. Dietary guidelines:  These are general recommendations. If you would like more specific information from a registered dietician, you can call the [facility] at [phone] and ask to meet with a Diabetes dietician    Carb/protein  goals  45-60 grams per meal  15-30 grams per snack  - increase your fiber intake to feel harrell, longer.  Fiber can also be beneficial for lowering cholesterol  - increase protein intake: aim for ~25g of protein per meal, 10-15g of protein for snacks    Carb counting apps:  - MyFitnessPal  - Lose It!  - Carb Manager  - [employer] food search lino   - Nutrition analyzer: Copy and paste any recipe into this analyzer, it will give you an estimated carb count for homemade recipes:  [email or web address]   Exercise guidelines from the [employer]:  - Get at least 150 minutes per week of moderate-intensity aerobic activity or 75 minutes per week of vigorous aerobic activity, or a combination of both, preferably spread throughout the week.    - Add moderate- to high-intensity muscle-strengthening activity (such as resistance or weights) on at least 2 days per week.    - Spend less time sitting. Even light-intensity activity can offset some of the risks of being sedentary. Taking a 10-15 minute walk after meals is very beneficial to blood sugar regulation.    - Increase amount and intensity of exercise gradually over time.     Thank you for visiting our office. We look forward to working with you to reach your health goals. As a reminder, if you need refills, please request early so there is enough time to process the request. We ask that you provide at least 5 days' notice before a refill is due, so there is time to send a request to pharmacy, process the refill, and ensure there are no other problems with obtaining the medication (backorders, prior authorization paperwork, etc). Routine refills will not be addressed on weekends, so please submit these requests during the week.

## 2024-03-20 NOTE — TELEPHONE ENCOUNTER
Note from Karlie PERRY: Can we get DM eye exam info from retinal specialist Omar Butterfield in [place]?     RN phoned office- spoke with , states no one is in the [place] office, will need to call back during their office hours: Thursday 8am-10 am    Phone: [phone]    Reminder sent to call in the morning

## 2024-03-20 NOTE — PROGRESS NOTES
[facility] Note - Telemedicine    Ceci Carroll verbally consents to a Telemedicine (Audio + Video) visit on 3/20/2024. The visit was conducted in a private patient room.      HISTORY OF PRESENT ILLNESS   Ceci Carroll is a 53 year old female with PMHx significant for HTN, T2DM who presents for  T2DM care.    Subjective:    Initial HPI 11/2023- with Dr. House  Diabetes was diagnosed at age 27.  Initially started on Metformin. She did endorse noncompliance at that time. She was started on insulin around her early 30s.   Has tried lantus, novolog. Currently on Novolin 70/30 50u BID  In 2015, she had a bad infection and stopped all her meds at that time; was hospitalized. Restarted therapy after discharge.  In 2019, A1c of 15% and noted slower gait and weakness. Diagnosed with groin infection and gangrenous GB. States she was very sick and after that she became very serious about her diabetes management.   She noted worsening neuropathy after 2019. Her A1c was 6.1% in 2022 while on Novolin. Made significant lifestyle changes and was compliant with medications.  Today, A1c POC is 8.4%  Uses wheelchair and roller to get around; feels she is getting stronger in terms of her right drop foot. Does not drive  Family hx: mother with DM, brother with DM (passed away), sister with DM  Polyuria/polydipsia:  No Going every 4-5 hours (which is better than previously)  Blurred vision:  Yes:       Diabetes Treatment history   Duration of therapy with insulin: since age 32   Metformin: not compliant  Lantus/Humalog: too expensive    Current Regimen for diabetes:   Oral/Injectable medications: denies   Basal:  Novolin 50u BID . Injects in abdomen  Lantus/novolog was more expensive   Prandial:  denies  Missed doses: endorses compliance     Interim hx:  2/2024- with Dr. House  Noted glucose ranging from 300 to 50s on darío; started on darío around 12/2023  Has had a few sensors that have been faulty   Noted  initially was taking novolin split dosing after eating and then switched to 50 BID and prior to meal dosing and noted numbers improved    She takes her novolin around 730/8A and 4P  Denies bruising, abdominal striae, excessive weight gain, proximal muscle weakness     2024- with [facility] ([facility]). Here to f/u on lab results.   3/2024- cpeptide completed, 3.4  LOV, she resumed hydrochlorothiazide and notes her BG spiked to 439 last Thursday and gradually incr'd. So she discontinued the medication. Previously was taking losartan, thought it had SE of cough, but is opening to restarting if necessary. Pt checked BP during visit on home cuff and it was 132/72.  She is hoping for an upcoming cataract removal surgery, and needs surgical clearance. Hasn't established w/ a PCP yet.   Current DM meds: 70/30 50u BID, claudia 3 with reader   BG recall from Claudia (using reader, so unable to see reports during visit): FB, pre-dinner: 120s    Last A1c value was 8.4% done 2023.  Previously trialed/failed DM meds: lantus/novolog- too expensive    History/Other:   REVIEW OF SYSTEMS  Ten point review of systems has been performed and is otherwise negative and/or non-contributory, except as described above.     Medications:     Current Outpatient Medications:     levothyroxine 50 MCG Oral Tab, Take 1 tablet (50 mcg total) by mouth before breakfast., Disp: 90 tablet, Rfl: 0    atorvastatin 40 MG Oral Tab, Take 1 tablet (40 mg total) by mouth nightly., Disp: 90 tablet, Rfl: 0    Levonorgestrel 20 MCG/DAY Intrauterine IUD, 1 Intra Uterine Device by Intrauterine route one time., Disp: , Rfl:     Continuous Blood Gluc Sensor (FREESTYLE CLAUDIA 3 SENSOR) Does not apply Misc, 1 each every 14 (fourteen) days., Disp: 2 each, Rfl: 2    Continuous Blood Gluc  (FREESTYLE CLAUDIA 3 READER) Does not apply Misc, 1 each As Directed. Use with Claudia 3 sensors to monitor blood glucose as directed., Disp: 1 each, Rfl: 0    Insulin NPH & Regular  70/30 (NOVOLIN 70/30 RELION) (70-30) 100 UNIT/ML Subcutaneous Suspension, Inject 50 Units into the skin 2 (two) times daily before meals., Disp: 30 mL, Rfl: 2     Allergies:   No Known Allergies    Social History:   Social History     Socioeconomic History    Marital status:    Tobacco Use    Smoking status: Never    Smokeless tobacco: Never   Vaping Use    Vaping Use: Never used   Substance and Sexual Activity    Alcohol use: Never    Drug use: Not Currently     Comment: None       Medical History:   Reviewed    Surgical history:   Past Surgical History:   Procedure Laterality Date    BACK SURGERY      Bedsore lower back surgery          CHOLECYSTECTOMY      LAPAROSCOPIC CHOLECYSTECTOMY         Objective:   PHYSICAL EXAM  Limited due to telemedicine encounter    Constitutional Not in acute distress  Pulmonary: no wheezing heard  No coughing on the phone. Speaking in full sentences   Neurological:  Alert and oriented to person, place and time.   Psychiatric: Normal affect, mood and behavior appropriate      Assessment & Plan:   (E11.40,  Z79.4) Type 2 diabetes mellitus with diabetic neuropathy, with long-term current use of insulin ([facility])  (primary encounter diagnosis)  (E78.5) Hyperlipidemia, unspecified hyperlipidemia type  (N18.4) CKD (chronic kidney disease) stage 4, GFR 15-29 ml/min ([facility])  (R80.9) Microalbuminuria  Plan:   Last A1c value was 8.4% done 2023,is not to goal <7%. Limited in interpretation without darío report. Today we discussed that given preserved cpeptide, it would be reasonable to consider a transition off of insulin by utilizing GLP1a agent. Cost has previously been a  barrier in the past, and she doesn't have medicare prescription drug coverage. I presented the option of trialing [facility] PAP program for ozempic coverage, but she does not want to add more medication. We discussed the benefit of GLP1a including weight reductive benefits, but she states she'd rather f/u with  bariatric surgeon. She feels her 70//30 regimen is working well, so no changes were made today.     We discussed the importance of glycemic control to prevent complications of diabetes. We discussed the importance of SBGM and consistent, low carb diet as well as moderate exercise as well.  We also discussed the complications of diabetes include retinopathy, neuropathy, nephropathy and cardiovascular disease.      - continue insulin 70/30 50u BID  - no strict CI for GLP1a, cpeptide preserved and would assist w/ weight loss and insulin weaning, but pt declined for now  - would avoid SGLT2i given GFR <20, new start not recommended  - avoid metformin, avoid DPP4i other than linagliptin given GFR <20  - continue Freestyle Claudia 3 CGM with reader  - continue to check BG 3-4x/day using glucometer or CGM  - advised needs to establish with PCP     DM quality metrics:  - Ophtho: No data recorded No data recorded- routed to staff to call pt/obtain records  - BP - checked during VV, stable, instructed to f/u with PCP  - LDL is not at goal 2/2024, taking statin , refill sent today  - MAB needs repeat- ordered 2/2024, reminded, would benefit from ARB for renoprotection, d/w PCP  - Neuropathy/ Foot exam: No data recorded  - CAD: none    (E03.9) Hypothyroidism, unspecified type  Plan: levothyroxine 50 MCG Oral Tab  2/2024 TSH 11.10, fT4 0.80, restarted LT4 50mcg daily. Has been taking for 6 weeks  - repeat TFTs after 3/20/24  - continue LT4 50mcg daily, first thing on empty stomach  - hold biotin containing products at least 3 days before labs completed    Return in about 2 months (around 5/20/2024) for scheduled f/u with Dr. House.    A total of 40 minutes was spent today on obtaining history, reviewing pertinent labs, evaluating patient, providing multiple treatment options, reinforcing diet/exercise and compliance, and completing documentation.      3/20/2024  ORLANDO Borrero

## 2024-03-20 NOTE — TELEPHONE ENCOUNTER
Since she is scheduled to see her PCP prior to her surgery I would still defer to PCP filling it out since I only see her for DM but her PCP manages her HTN, HLD, etc. So she would be able to better assess her risk prior to surgery.

## 2024-03-21 NOTE — TELEPHONE ENCOUNTER
RN Phoned Dr. Butterfield's office and spoke with Breann- eye exam to be faxed to our office.    Will await fax.

## 2024-04-11 ENCOUNTER — OFFICE VISIT (OUTPATIENT)
Dept: INTERNAL MEDICINE CLINIC | Facility: CLINIC | Age: 54
End: 2024-04-11
Payer: MEDICARE

## 2024-04-11 ENCOUNTER — EKG ENCOUNTER (OUTPATIENT)
Dept: LAB | Facility: HOSPITAL | Age: 54
End: 2024-04-11
Attending: INTERNAL MEDICINE
Payer: MEDICARE

## 2024-04-11 VITALS
DIASTOLIC BLOOD PRESSURE: 82 MMHG | OXYGEN SATURATION: 98 % | RESPIRATION RATE: 16 BRPM | HEART RATE: 87 BPM | SYSTOLIC BLOOD PRESSURE: 136 MMHG | TEMPERATURE: 99 F

## 2024-04-11 DIAGNOSIS — N18.4 CKD (CHRONIC KIDNEY DISEASE) STAGE 4, GFR 15-29 ML/MIN (HCC): ICD-10-CM

## 2024-04-11 DIAGNOSIS — H26.9 CATARACT OF BOTH EYES, UNSPECIFIED CATARACT TYPE: ICD-10-CM

## 2024-04-11 DIAGNOSIS — E11.22 TYPE 2 DIABETES MELLITUS WITH STAGE 4 CHRONIC KIDNEY DISEASE, WITH LONG-TERM CURRENT USE OF INSULIN (HCC): ICD-10-CM

## 2024-04-11 DIAGNOSIS — I10 PRIMARY HYPERTENSION: ICD-10-CM

## 2024-04-11 DIAGNOSIS — Z79.4 TYPE 2 DIABETES MELLITUS WITH STAGE 4 CHRONIC KIDNEY DISEASE, WITH LONG-TERM CURRENT USE OF INSULIN (HCC): ICD-10-CM

## 2024-04-11 DIAGNOSIS — E78.00 HYPERCHOLESTEREMIA: ICD-10-CM

## 2024-04-11 DIAGNOSIS — Z01.818 PREOPERATIVE EXAMINATION: Primary | ICD-10-CM

## 2024-04-11 DIAGNOSIS — N18.4 TYPE 2 DIABETES MELLITUS WITH STAGE 4 CHRONIC KIDNEY DISEASE, WITH LONG-TERM CURRENT USE OF INSULIN (HCC): ICD-10-CM

## 2024-04-11 DIAGNOSIS — E03.9 HYPOTHYROIDISM, UNSPECIFIED TYPE: ICD-10-CM

## 2024-04-11 DIAGNOSIS — Z01.818 PREOPERATIVE EXAMINATION: ICD-10-CM

## 2024-04-11 PROBLEM — N18.32 STAGE 3B CHRONIC KIDNEY DISEASE (HCC): Status: ACTIVE | Noted: 2023-06-23

## 2024-04-11 PROBLEM — H40.9 GLAUCOMA: Status: ACTIVE | Noted: 2020-12-01

## 2024-04-11 PROBLEM — E11.21 DIABETIC NEPHROPATHY ASSOCIATED WITH TYPE 2 DIABETES MELLITUS (HCC): Status: ACTIVE | Noted: 2022-03-04

## 2024-04-11 PROBLEM — T14.8XXA NON-HEALING NON-SURGICAL WOUND: Status: RESOLVED | Noted: 2021-07-21 | Resolved: 2024-04-11

## 2024-04-11 PROBLEM — T14.8XXA NON-HEALING NON-SURGICAL WOUND: Status: ACTIVE | Noted: 2021-07-21

## 2024-04-11 PROBLEM — M21.371 FOOT DROP, RIGHT: Status: ACTIVE | Noted: 2022-06-02

## 2024-04-11 PROBLEM — F33.1 MODERATE EPISODE OF RECURRENT MAJOR DEPRESSIVE DISORDER (HCC): Status: ACTIVE | Noted: 2022-04-19

## 2024-04-11 PROBLEM — E11.42 DIABETIC POLYNEUROPATHY ASSOCIATED WITH TYPE 2 DIABETES MELLITUS (HCC): Status: ACTIVE | Noted: 2020-01-17

## 2024-04-11 LAB
ATRIAL RATE: 75 BPM
P AXIS: 8 DEGREES
P-R INTERVAL: 146 MS
Q-T INTERVAL: 420 MS
QRS DURATION: 76 MS
QTC CALCULATION (BEZET): 469 MS
R AXIS: 4 DEGREES
T AXIS: 160 DEGREES
VENTRICULAR RATE: 75 BPM

## 2024-04-11 PROCEDURE — 93010 ELECTROCARDIOGRAM REPORT: CPT | Performed by: INTERNAL MEDICINE

## 2024-04-11 PROCEDURE — 99204 OFFICE O/P NEW MOD 45 MIN: CPT | Performed by: INTERNAL MEDICINE

## 2024-04-11 PROCEDURE — 96127 BRIEF EMOTIONAL/BEHAV ASSMT: CPT | Performed by: INTERNAL MEDICINE

## 2024-04-11 PROCEDURE — 93005 ELECTROCARDIOGRAM TRACING: CPT

## 2024-04-11 RX ORDER — BROMFENAC SODIUM 0.81 MG/ML
1 SOLUTION/ DROPS OPHTHALMIC
COMMUNITY
Start: 2024-04-09

## 2024-04-11 RX ORDER — ATORVASTATIN CALCIUM 40 MG/1
40 TABLET, FILM COATED ORAL NIGHTLY
Qty: 90 TABLET | Refills: 1 | Status: SHIPPED | OUTPATIENT
Start: 2024-04-11

## 2024-04-11 RX ORDER — LEVOTHYROXINE SODIUM 0.05 MG/1
50 TABLET ORAL
Qty: 90 TABLET | Refills: 0 | Status: SHIPPED | OUTPATIENT
Start: 2024-04-11

## 2024-04-11 RX ORDER — PREDNISOLONE ACETATE 10 MG/ML
1 SUSPENSION/ DROPS OPHTHALMIC EVERY 6 HOURS
COMMUNITY
Start: 2024-04-09

## 2024-04-11 RX ORDER — MOXIFLOXACIN 5 MG/ML
SOLUTION/ DROPS OPHTHALMIC
COMMUNITY
Start: 2024-04-09

## 2024-04-11 RX ORDER — LOSARTAN POTASSIUM 25 MG/1
25 TABLET ORAL DAILY
Qty: 90 TABLET | Refills: 1 | Status: SHIPPED | OUTPATIENT
Start: 2024-04-11

## 2024-04-11 NOTE — PATIENT INSTRUCTIONS
- Schedule EKG asap.  Call central scheduling at [phone] to schedule an EKG appointment.  Let us know if they cannot schedule you for today/tomorrow/Saturday.  - Follow up with a nephrologist (kidney specialist) asap:  Jenn Bautista, [address], [facility] [address]  [place] [address]  [phone]    [address]  [facility] [address]  [place] [address]  [phone]    - Follow up with us in the next couple of months once cataract surgeries are done for your Medicare Wellness Visit.    It was a pleasure seeing you in the clinic today.  Thank you for choosing the [facility] office for your healthcare needs. Please call at [phone] with any questions or concerns.    Albina Dolan MD

## 2024-04-11 NOTE — PROGRESS NOTES
Ceci Carroll is a 53 year old female who presents for a pre-operative physical exam.   Ceci Carroll is scheduled for left and right cataract procedures at [facility] on 4/16/24 and 4/30/23 performed by Dr. Ramirez, who has requested that I provide pre-operative consultation before this procedure. Indication: The primary encounter diagnosis was Preoperative examination. Diagnoses of Cataract of both eyes, unspecified cataract type, Primary hypertension, Hypercholesteremia, Hypothyroidism, unspecified type, Type 2 diabetes mellitus with stage 4 chronic kidney disease, with long-term current use of insulin ([facility]), and CKD (chronic kidney disease) stage 4, GFR 15-29 ml/min ([facility])  HPI related to surgery:   She has had previous anesthesia:  Yes.  Previous complications:  No  Patient has reasonable functional status. She is in wheelchair.  No active anginal symptoms, no history of arrhythmias, coronary artery disease, valvular disease.  She has bilateral cataracts.  Worsening visual issues due to this.  Will be having cataract surgery.    Hypertension - reasonable blood pressure; she was on losartan previously.   Hypercholesterolemia - on statin therapy, needs refill.  Hypothyroidism - started on levothyroxine this year.  TSH was elevated at beginning of the year.  DM II - complicated by CKD, neuropathy, retinopathy per notes.   Following with [facility].  A1c of 8.4% in November 2023.  CKD IV - no anasarca.  Has not seen a nephrologist before.    REVIEW OF SYSTEMS:   GENERAL/ const: no fevers/chills. feels well, no weight loss  SKIN: denies any unusual skin lesions  EYES:no vision problems  HEENT: denies sinus pain or sinus tenderness  LUNGS: denies shortness of breath   CARDIOVASCULAR: denies chest pain  GI: denies nausea/emesis/ abdominal pain diarrhea constipation  : denies dysuria   MUSCULOSKELETAL: chronic joint pain  NEURO: denies headaches  PSYCHIATRIC: denies issues  ENDOCRINE: no  hot/cold intolerance  ALLERGY: No Known Allergies  PAST HISTORY:     Current Outpatient Medications:     Bromfenac Sodium (PROLENSA) 0.07 % Ophthalmic Solution, Apply 1 drop to eye., Disp: , Rfl:     moxifloxacin 0.5 % Ophthalmic Solution, instill 1 drop by ophthalmic route 4 times every day in left eye, start 2 days before surgery and continue after, Disp: , Rfl:     prednisoLONE 1 % Ophthalmic Suspension, Apply 1 drop to eye every 6 (six) hours., Disp: , Rfl:     atorvastatin 40 MG Oral Tab, Take 1 tablet (40 mg total) by mouth nightly., Disp: 90 tablet, Rfl: 1    levothyroxine 50 MCG Oral Tab, Take 1 tablet (50 mcg total) by mouth before breakfast., Disp: 90 tablet, Rfl: 0    losartan 25 MG Oral Tab, Take 1 tablet (25 mg total) by mouth daily., Disp: 90 tablet, Rfl: 1    Levonorgestrel 20 MCG/DAY Intrauterine IUD, 1 Intra Uterine Device by Intrauterine route one time., Disp: , Rfl:     Continuous Blood Gluc Sensor (FREESTYLE TERRY 3 SENSOR) Does not apply Misc, 1 each every 14 (fourteen) days., Disp: 2 each, Rfl: 2    Continuous Blood Gluc  (FREESTYLE TERRY 3 READER) Does not apply Misc, 1 each As Directed. Use with Terry 3 sensors to monitor blood glucose as directed., Disp: 1 each, Rfl: 0    Insulin NPH & Regular 70/30 (NOVOLIN 70/30 RELION) (70-30) 100 UNIT/ML Subcutaneous Suspension, Inject 50 Units into the skin 2 (two) times daily before meals., Disp: 30 mL, Rfl: 2  Medical:  has a past medical history of Diabetes (HCC), Eye disorder, Hypertension, and Non-healing non-surgical wound (2021).  Surgical:  has a past surgical history that includes ; Laparoscopic cholecystectomy; back surgery; and cholecystectomy.  Family: family history includes Diabetes in her brother, mother, and sister; Heart Disorder in her mother; Stroke in her sister.  Social:   Social History     Socioeconomic History    Marital status:    Tobacco Use    Smoking status: Never    Smokeless tobacco: Never    Vaping Use    Vaping status: Never Used   Substance and Sexual Activity    Alcohol use: Never    Drug use: Not Currently   Other Topics Concern    Caffeine Concern Yes     Comment: 1 can of Coke daily    Exercise No     PHYSICAL EXAM:   /82 (BP Location: Left arm, Patient Position: Sitting, Cuff Size: large)   Pulse 87   Temp 98.5 °F (36.9 °C) (Temporal)   Resp 16   SpO2 98%   Breastfeeding No    GENERAL: Alert and oriented, well developed, well nourished,in no apparent distress  SKIN: no rashes,no suspicious lesions  HEENT: atraumatic, PERRLA, EOMI, normal lid and conjunctiva  NECK: supple, no jvd, no thyromegaly  LUNGS: clear to auscultation bilaterally, no wheezing/rubs  CARDIO: RRR without murmurs.  No clubbing, cyanosis or edema.  GI: soft non tender nondistended no hepatosplenomegaly, bowel sounds throughout  NEURO: in wheelchair  PSYCH: pleasant, appropriate mood and affect  LABORATORY DATA:   CMP: CKD stage IV  EKG: Normal sinus rhythm, no acute ST-T changes, LVH.  ASSESSMENT AND PLAN:   1.  Pre-operative examination  Patient presents for pre-operative examination at the request of performing surgeon.  No active anginal symptoms, no history of arrhythmias, coronary artery disease, valvular disease.  EKG with signs of LVH.  Losartan resumed.  RCRI score of 2,  Class III risk, 10.1% risk of major cardiac event.  She is considered an intermediate to high risk patient undergoing a low risk procedure, and is ok to proceed with surgery.  Note and pertinent pre-operative testing results will be faxed to referring surgeon's office and/or surgical center as requested.  - EKG 12 Lead; Future    2. Cataract of both eyes, unspecified cataract type  As above, to have bilateral cataract surgery.    3. Primary hypertension  At goal blood pressure, however she was on losartan previously.  EKG with LVH signs.  Will resume losartan at 25 mg every day dosing.  - EKG 12 Lead; Future  - losartan 25 MG Oral Tab; Take  1 tablet (25 mg total) by mouth daily.  Dispense: 90 tablet; Refill: 1    4. Hypercholesteremia  On statin therapy.  Atorvastatin refilled as below.  - EKG 12 Lead; Future  - atorvastatin 40 MG Oral Tab; Take 1 tablet (40 mg total) by mouth nightly.  Dispense: 90 tablet; Refill: 1    5. Hypothyroidism, unspecified type  TSH elevated earlier this year.  Started on levothyroxine.  Refilled as below.    - levothyroxine 50 MCG Oral Tab; Take 1 tablet (50 mcg total) by mouth before breakfast.  Dispense: 90 tablet; Refill: 0    6. Type 2 diabetes mellitus with stage 4 chronic kidney disease, with long-term current use of insulin ([facility])  Following with [facility].  On insulin therapy.    - EKG 12 Lead; Future  - atorvastatin 40 MG Oral Tab; Take 1 tablet (40 mg total) by mouth nightly.  Dispense: 90 tablet; Refill: 1    7. CKD (chronic kidney disease) stage 4, GFR 15-29 ml/min ([facility])  Creatinine/GFR in CKD IV range on labs from February.  Recommend follow up with Nephrology - referral entered, contact information provided.  - EKG 12 Lead; Future    Patient Care Team:  Pcp, None as PCP - General  The patient indicates understanding of these issues and agrees to the plan.  The patient is asked to return to clinic in 2-3 months for follow up on chronic issues/Medicare Wellness Visit, or earlier if acute issues arise.  Albina Dolan MD

## 2024-04-12 ENCOUNTER — TELEPHONE (OUTPATIENT)
Dept: INTERNAL MEDICINE CLINIC | Facility: CLINIC | Age: 54
End: 2024-04-12

## 2024-04-17 ENCOUNTER — MED REC SCAN ONLY (OUTPATIENT)
Facility: CLINIC | Age: 54
End: 2024-04-17

## 2024-05-20 ENCOUNTER — OFFICE VISIT (OUTPATIENT)
Facility: CLINIC | Age: 54
End: 2024-05-20

## 2024-05-20 ENCOUNTER — LAB ENCOUNTER (OUTPATIENT)
Dept: LAB | Age: 54
End: 2024-05-20
Attending: STUDENT IN AN ORGANIZED HEALTH CARE EDUCATION/TRAINING PROGRAM

## 2024-05-20 VITALS
OXYGEN SATURATION: 97 % | DIASTOLIC BLOOD PRESSURE: 86 MMHG | RESPIRATION RATE: 18 BRPM | SYSTOLIC BLOOD PRESSURE: 132 MMHG | HEART RATE: 87 BPM | WEIGHT: 220 LBS | BODY MASS INDEX: 40.48 KG/M2 | HEIGHT: 62 IN

## 2024-05-20 DIAGNOSIS — I10 HYPERTENSION, UNSPECIFIED TYPE: Primary | ICD-10-CM

## 2024-05-20 DIAGNOSIS — Z79.4 TYPE 2 DIABETES MELLITUS WITH DIABETIC NEUROPATHY, WITH LONG-TERM CURRENT USE OF INSULIN (HCC): ICD-10-CM

## 2024-05-20 DIAGNOSIS — I10 HYPERTENSION, UNSPECIFIED TYPE: ICD-10-CM

## 2024-05-20 DIAGNOSIS — E03.9 HYPOTHYROIDISM, UNSPECIFIED TYPE: ICD-10-CM

## 2024-05-20 DIAGNOSIS — E11.40 TYPE 2 DIABETES MELLITUS WITH DIABETIC NEUROPATHY, WITH LONG-TERM CURRENT USE OF INSULIN (HCC): ICD-10-CM

## 2024-05-20 LAB
ANION GAP SERPL CALC-SCNC: 11 MMOL/L (ref 0–18)
BUN BLD-MCNC: 35 MG/DL (ref 9–23)
CALCIUM BLD-MCNC: 8.2 MG/DL (ref 8.5–10.1)
CHLORIDE SERPL-SCNC: 109 MMOL/L (ref 98–112)
CO2 SERPL-SCNC: 19 MMOL/L (ref 21–32)
CREAT BLD-MCNC: 3.34 MG/DL
EGFRCR SERPLBLD CKD-EPI 2021: 16 ML/MIN/1.73M2 (ref 60–?)
EST. AVERAGE GLUCOSE BLD GHB EST-MCNC: 163 MG/DL (ref 68–126)
GLUCOSE BLD-MCNC: 145 MG/DL (ref 70–99)
HBA1C MFR BLD: 7.3 % (ref ?–5.7)
OSMOLALITY SERPL CALC.SUM OF ELEC: 299 MOSM/KG (ref 275–295)
POTASSIUM SERPL-SCNC: 3.2 MMOL/L (ref 3.5–5.1)
SODIUM SERPL-SCNC: 139 MMOL/L (ref 136–145)
T4 FREE SERPL-MCNC: 1.1 NG/DL (ref 0.8–1.7)
THYROGLOB SERPL-MCNC: 74 U/ML (ref ?–60)
THYROPEROXIDASE AB SERPL-ACNC: 3630 U/ML (ref ?–60)
TSI SER-ACNC: 5.22 MIU/ML (ref 0.36–3.74)

## 2024-05-20 PROCEDURE — 83036 HEMOGLOBIN GLYCOSYLATED A1C: CPT

## 2024-05-20 PROCEDURE — 36415 COLL VENOUS BLD VENIPUNCTURE: CPT

## 2024-05-20 PROCEDURE — 86376 MICROSOMAL ANTIBODY EACH: CPT

## 2024-05-20 PROCEDURE — 80048 BASIC METABOLIC PNL TOTAL CA: CPT

## 2024-05-20 PROCEDURE — 84439 ASSAY OF FREE THYROXINE: CPT

## 2024-05-20 PROCEDURE — 84443 ASSAY THYROID STIM HORMONE: CPT

## 2024-05-20 PROCEDURE — 86800 THYROGLOBULIN ANTIBODY: CPT

## 2024-05-20 NOTE — PROGRESS NOTES
[facility] Note     HISTORY OF PRESENT ILLNESS   Ceci Carroll is a 54 year old female with PMHx significant for HTN, T2DM who presents for  T2DM care.    Subjective:    Initial HPI 11/2023- with Dr. House  Diabetes was diagnosed at age 27.  Initially started on Metformin. She did endorse noncompliance at that time. She was started on insulin around her early 30s.   Has tried lantus, novolog. Currently on Novolin 70/30 50u BID  In 2015, she had a bad infection and stopped all her meds at that time; was hospitalized. Restarted therapy after discharge.  In 2019, A1c of 15% and noted slower gait and weakness. Diagnosed with groin infection and gangrenous GB. States she was very sick and after that she became very serious about her diabetes management.   She noted worsening neuropathy after 2019. Her A1c was 6.1% in 2022 while on Novolin. Made significant lifestyle changes and was compliant with medications.  Today, A1c POC is 8.4%  Uses wheelchair and roller to get around; feels she is getting stronger in terms of her right drop foot. Does not drive  Family hx: mother with DM, brother with DM (passed away), sister with DM  Polyuria/polydipsia:  No Going every 4-5 hours (which is better than previously)  Blurred vision:  Yes:       Diabetes Treatment history   Duration of therapy with insulin: since age 32   Metformin: not compliant  Lantus/Humalog: too expensive    Current Regimen for diabetes:   Oral/Injectable medications: denies   Basal:  Novolin 50u BID . Injects in abdomen  Lantus/novolog was more expensive   Prandial:  denies  Missed doses: endorses compliance     Interim hx:  2/2024- with Dr. House  Noted glucose ranging from 300 to 50s on darío; started on darío around 12/2023  Has had a few sensors that have been faulty   Noted initially was taking novolin split dosing after eating and then switched to 50 BID and prior to meal dosing and noted numbers improved    She takes her novolin  around 730/8A and 4P  Denies bruising, abdominal striae, excessive weight gain, proximal muscle weakness     2024- with [facility] ([facility]). Here to f/u on lab results.   3/2024- cpeptide completed, 3.4  LOV, she resumed hydrochlorothiazide and notes her BG spiked to 439 last Thursday and gradually incr'd. So she discontinued the medication. Previously was taking losartan, thought it had SE of cough, but is opening to restarting if necessary. Pt checked BP during visit on home cuff and it was 132/72.  She is hoping for an upcoming cataract removal surgery, and needs surgical clearance. Hasn't established w/ a PCP yet.   Current DM meds: 70/30 50u BID, claudia 3 with reader   BG recall from Claudia (using reader, so unable to see reports during visit): FB, pre-dinner: 120s    May 2024- with Dr. House  Established care with PCP, currently on Losartan 25 mg daily   Had cataract surgery  and  and notes significant improvement   Reviewed claudia, in range 87% time  Is planning on following with nephrology   Continues to note numbness in her feet and foot drop, discussed seeing neurology    Last A1c value was 7.3% done 2024.   Previously trialed/failed DM meds: lantus/novolog- too expensive    History/Other:   REVIEW OF SYSTEMS  Ten point review of systems has been performed and is otherwise negative and/or non-contributory, except as described above.     Medications:     Current Outpatient Medications:     atorvastatin 40 MG Oral Tab, Take 1 tablet (40 mg total) by mouth nightly., Disp: 90 tablet, Rfl: 1    levothyroxine 50 MCG Oral Tab, Take 1 tablet (50 mcg total) by mouth before breakfast., Disp: 90 tablet, Rfl: 0    losartan 25 MG Oral Tab, Take 1 tablet (25 mg total) by mouth daily., Disp: 90 tablet, Rfl: 1    Levonorgestrel 20 MCG/DAY Intrauterine IUD, 1 Intra Uterine Device by Intrauterine route one time., Disp: , Rfl:     Continuous Blood Gluc Sensor (FoldeesSTYLE CLAUDIA 3 SENSOR) Does not apply Misc, 1  each every 14 (fourteen) days., Disp: 2 each, Rfl: 2    Continuous Blood Gluc  (FREESTYLE TERRY 3 READER) Does not apply Misc, 1 each As Directed. Use with Terry 3 sensors to monitor blood glucose as directed., Disp: 1 each, Rfl: 0    Insulin NPH & Regular 70/30 (NOVOLIN 70/30 RELION) (70-30) 100 UNIT/ML Subcutaneous Suspension, Inject 50 Units into the skin 2 (two) times daily before meals., Disp: 30 mL, Rfl: 2     Allergies:   No Known Allergies    Social History:   Social History     Socioeconomic History    Marital status:    Tobacco Use    Smoking status: Never    Smokeless tobacco: Never   Vaping Use    Vaping status: Never Used   Substance and Sexual Activity    Alcohol use: Never    Drug use: Not Currently   Other Topics Concern    Caffeine Concern Yes     Comment: 1 can of Coke daily    Exercise No       Medical History:   Reviewed    Surgical history:   Past Surgical History:   Procedure Laterality Date    Back surgery      [place] lower back surgery          Cholecystectomy      Laparoscopic cholecystectomy         Objective:   Vitals:    24 1008   BP: 132/86   Pulse: 87   Resp: 18      PHYSICAL EXAM     Constitutional Not in acute distress  Pulmonary: no wheezing heard  No coughing on the phone. Speaking in full sentences   Neurological:  Alert and oriented to person, place and time.   Psychiatric: Normal affect, mood and behavior appropriate      Assessment & Plan:   (E11.40,  Z79.4) Type 2 diabetes mellitus with diabetic neuropathy, with long-term current use of insulin ([facility])  (primary encounter diagnosis)  (E78.5) Hyperlipidemia, unspecified hyperlipidemia type  (N18.4) CKD (chronic kidney disease) stage 4, GFR 15-29 ml/min ([facility])  (R80.9) Microalbuminuria  Plan:   Last A1c value was 7.3% done 2024,is not to goal <7% but improved from  8.4% done 2023. Previously discussed that given preserved cpeptide, it would be reasonable to consider a transition off of  insulin by utilizing GLP1a agent. Cost has previously been a  barrier in the past, but she does not want to add more medication. She feels her 70//30 regimen is working well  We reviewed her claudia and made some dose changes to 70/30    We discussed the importance of glycemic control to prevent complications of diabetes. We discussed the importance of SBGM and consistent, low carb diet as well as moderate exercise as well.  We also discussed the complications of diabetes include retinopathy, neuropathy, nephropathy and cardiovascular disease.      - change insulin 70/30 50u BID --> 48 units in AM and 52 units with dinner   - no strict CI for GLP1a, cpeptide preserved and would assist w/ weight loss and insulin weaning, but pt declined for now  - would avoid SGLT2i given GFR <20, new start not recommended  - avoid metformin, avoid DPP4i other than linagliptin given GFR <20  - continue Freestyle Claudia 3 CGM with reader  - continue to check BG 3-4x/day using glucometer or CGM  - advised needs to establish with PCP     DM quality metrics:  - Ophtho: Last Dilated Eye Exam: 02/09/24   Eye Exam shows Diabetic Retinopathy?: No  - routed to staff to call pt/obtain records  - BP - checked during VV, stable, instructed to f/u with PCP  - LDL is not at goal 2/2024, taking statin , refill sent today  - MAB needs repeat- ordered 2/2024, reminded, would benefit from ARB for renoprotection, d/w PCP  - Neuropathy/ Foot exam: No data recorded  - CAD: none    (E03.9) Hypothyroidism, unspecified type  Plan: levothyroxine 50 MCG Oral Tab  2/2024 TSH 11.10, fT4 0.80, restarted LT4 50mcg daily. 5/2024 TSH 5.22  - repeat TFTs after 8/2024  - increase LT4 50mcg daily --> 50 mcg M-Sa + 100 mcg Manzanares, first thing on empty stomach     Return in about 3 months (around 8/20/2024).    Time was spent today on obtaining history, reviewing pertinent labs, evaluating patient, providing multiple treatment options and managing prescription medications,  reinforcing diet/exercise and compliance, and completing documentation.      5/20/2024  Chayo House DO

## 2024-05-20 NOTE — PATIENT INSTRUCTIONS
Return Visit   [ x ] Physician in 3 months with Karlie and 6 months with Dr. House   [  ] In person or video visit  [  ] In person only    [ x ] After visit summary      It was great seeing you today! You are doing well from an A1c standpoint!    Today we discussed your diabetes:  -Please reduce your morning Novolin to 48 units and increase your late afternoon Novolin dose to 52 units  -Please reach out to me if you continue to have glucose less than 70 or greater than 250  -I will reach out to you once your labs result from today  -Please follow with neurology and nephrology     Take care!  -Dr. House

## 2024-05-22 ENCOUNTER — TELEPHONE (OUTPATIENT)
Facility: CLINIC | Age: 54
End: 2024-05-22

## 2024-05-22 DIAGNOSIS — E03.9 HYPOTHYROIDISM, UNSPECIFIED TYPE: Primary | ICD-10-CM

## 2024-05-22 NOTE — TELEPHONE ENCOUNTER
Lab result: I have reviewed her current labs.  Her labs show Hashimoto's as the cause of her hypothyroidism.  Her TSH is above range on levothyroxine 50 mcg daily.  I saw her on May 20 and she endorsed compliance.  I would recommend increasing her levothyroxine to 50 mcg Monday through Saturday and 100 mcg on Sunday with repeat labs in 3 months prior to her next visit.  Please let me know if she needs any refills.  Her calcium levels are also on the the low side.  Would recommend starting a total of 1200 mg of calcium as a combination from diet/supplement.  Can repeat a BMP in 3 months as well.     RN phoned to discuss lab results- patient v/u and denies need of refill at this time.    Future labs pended and routed in TE

## 2024-05-28 LAB — T4 FREE DIALYSIS/MS: 1 NG/DL

## 2024-06-14 ENCOUNTER — TELEPHONE (OUTPATIENT)
Facility: CLINIC | Age: 54
End: 2024-06-14

## 2024-06-14 NOTE — TELEPHONE ENCOUNTER
Received phone call from [employer] requesting last office visit notes for patient. Notes should be faxed to [phone].

## 2024-06-14 NOTE — TELEPHONE ENCOUNTER
OV notes from 5/20/24 visit faxed to [employer] at [phone].     Fax confirmation received.    Closing this encounter.

## 2024-08-19 ENCOUNTER — PATIENT MESSAGE (OUTPATIENT)
Facility: CLINIC | Age: 54
End: 2024-08-19

## 2024-08-20 NOTE — TELEPHONE ENCOUNTER
From: Ceci Carroll  To: Chayo House  Sent: 8/19/2024 7:29 PM CDT  Subject: Blood glucose levels     Good evening. I've been experiencing lower than usual blood glucose levels for the last few days. I have adjusted my eating habits and cut out carbs. Is this something I should be concerned about?  Levels have been as low as 54 overnight.    Thank you

## 2024-08-21 ENCOUNTER — NURSE ONLY (OUTPATIENT)
Facility: CLINIC | Age: 54
End: 2024-08-21
Payer: MEDICARE

## 2024-08-21 DIAGNOSIS — E11.40 TYPE 2 DIABETES MELLITUS WITH DIABETIC NEUROPATHY, WITH LONG-TERM CURRENT USE OF INSULIN (HCC): Primary | ICD-10-CM

## 2024-08-21 DIAGNOSIS — Z79.4 TYPE 2 DIABETES MELLITUS WITH DIABETIC NEUROPATHY, WITH LONG-TERM CURRENT USE OF INSULIN (HCC): Primary | ICD-10-CM

## 2024-08-21 PROCEDURE — 99211 OFF/OP EST MAY X REQ PHY/QHP: CPT | Performed by: STUDENT IN AN ORGANIZED HEALTH CARE EDUCATION/TRAINING PROGRAM

## 2024-08-21 NOTE — PROGRESS NOTES
Continuous Glucose Monitor Download - Claudia 3    Ceci TIO Carroll  4/28/1970    Referred by: Chayo House DO       Medical Background      Past Medical History:    Diabetes (HCC)    Eye disorder    Hypertension    Non-healing non-surgical wound      Lab Results   Component Value Date    A1C 7.3 (H) 05/20/2024    A1C 8.4 (A) 11/22/2023    A1C >14.0 06/15/2019    A1C >14.0 05/18/2019         Patient has T2DM, seen today regarding low trending blood glucose levels since adjusting diet     Medication Review      DM Meds: insulin NPH & regular 70/30 Susp - (70-30) 100 Units/mL        Patient States Currently Taking:   - 48 units in AM and 52 units with dinner     Blood Glucose Review      .Patient uses reader with CGM, verbal report of 14 day data from reader added below:    Data for last 7 days --  Average Glucose: 119  8 % High  82 % In Range  10% Low    Interpretation of Data:  Lows have become more significant overnight, usually does not have lows during the day     Patient Concerns      - Having lows overnight, symptomatic down into the 60s   - Treating lows with candy or applesauce or regular coke    --> talked about the rule 15     Recommendations / Plan      - Adjust 48 u in AM and 48 u PM     Future Appointments   Date Time Provider Department Center   8/21/2024  3:00 PM [facility]ETIC EDUCATOR [facility]O [facility]   9/5/2024  3:00 PM Jacque Allan MD [facility]   11/8/2024 11:00 AM Chayo House DO [id number] [facility]        Routed to provider for review.    8/21/2024  Jennifer Yates RN, MSN, BC-ADM, [facility]  Diabetes Care &      A total of 30 minutes was spent with the patient including chart review, discussion and education / advisement pertinent to patient and provider specified concerns as documented above.     Note to patient: The 21 Century Cures Act makes medical notes like these available to patients in the interest of transparency. However, be advised  this is a medical document. It is intended as peer to peer communication. It is written in medical language and may contain abbreviations or verbiage that are unfamiliar. It may appear blunt or direct. Medical documents are intended to carry relevant information, facts as evident, and the clinical opinion of the practitioner.

## 2024-08-22 ENCOUNTER — PATIENT MESSAGE (OUTPATIENT)
Facility: CLINIC | Age: 54
End: 2024-08-22

## 2024-08-22 DIAGNOSIS — Z79.4 TYPE 2 DIABETES MELLITUS WITH DIABETIC NEUROPATHY, WITH LONG-TERM CURRENT USE OF INSULIN (HCC): Primary | ICD-10-CM

## 2024-08-22 DIAGNOSIS — E11.40 TYPE 2 DIABETES MELLITUS WITH DIABETIC NEUROPATHY, WITH LONG-TERM CURRENT USE OF INSULIN (HCC): Primary | ICD-10-CM

## 2024-08-22 NOTE — TELEPHONE ENCOUNTER
Patient not sharing in Claudia view.    [employer] sent to confirm if patient is still wearing her CGM sensors.     Can then invite to share.

## 2024-08-23 NOTE — TELEPHONE ENCOUNTER
How to get connected to darío view: under menu, click: Connected apps- darío manage, connect to practice- then put in phone number code. [facility] should come up.     MyChart response sent to patient.

## 2024-08-26 NOTE — TELEPHONE ENCOUNTER
Patient connected in Workables, will get A1c done.     Appointment changed to Video Visit.     Mychart update sent to patient.     Closing Encounter.

## 2024-09-05 ENCOUNTER — LAB ENCOUNTER (OUTPATIENT)
Dept: LAB | Age: 54
End: 2024-09-05
Attending: STUDENT IN AN ORGANIZED HEALTH CARE EDUCATION/TRAINING PROGRAM
Payer: MEDICARE

## 2024-09-05 ENCOUNTER — OFFICE VISIT (OUTPATIENT)
Dept: NEPHROLOGY | Facility: CLINIC | Age: 54
End: 2024-09-05
Payer: MEDICARE

## 2024-09-05 VITALS — WEIGHT: 220 LBS | BODY MASS INDEX: 40 KG/M2 | DIASTOLIC BLOOD PRESSURE: 56 MMHG | SYSTOLIC BLOOD PRESSURE: 118 MMHG

## 2024-09-05 DIAGNOSIS — R80.9 PROTEINURIA, UNSPECIFIED TYPE: ICD-10-CM

## 2024-09-05 DIAGNOSIS — E03.9 HYPOTHYROIDISM, UNSPECIFIED TYPE: ICD-10-CM

## 2024-09-05 DIAGNOSIS — Z79.4 TYPE 2 DIABETES MELLITUS WITH DIABETIC NEUROPATHY, WITH LONG-TERM CURRENT USE OF INSULIN (HCC): ICD-10-CM

## 2024-09-05 DIAGNOSIS — E11.21 DIABETIC NEPHROPATHY ASSOCIATED WITH TYPE 2 DIABETES MELLITUS (HCC): ICD-10-CM

## 2024-09-05 DIAGNOSIS — E11.40 TYPE 2 DIABETES MELLITUS WITH DIABETIC NEUROPATHY, WITH LONG-TERM CURRENT USE OF INSULIN (HCC): ICD-10-CM

## 2024-09-05 DIAGNOSIS — N18.4 CKD (CHRONIC KIDNEY DISEASE) STAGE 4, GFR 15-29 ML/MIN (HCC): Primary | ICD-10-CM

## 2024-09-05 LAB
ANION GAP SERPL CALC-SCNC: 9 MMOL/L (ref 0–18)
BUN BLD-MCNC: 33 MG/DL (ref 9–23)
CALCIUM BLD-MCNC: 8.3 MG/DL (ref 8.7–10.4)
CHLORIDE SERPL-SCNC: 105 MMOL/L (ref 98–112)
CO2 SERPL-SCNC: 24 MMOL/L (ref 21–32)
CREAT BLD-MCNC: 3.08 MG/DL
EGFRCR SERPLBLD CKD-EPI 2021: 18 ML/MIN/1.73M2 (ref 60–?)
EST. AVERAGE GLUCOSE BLD GHB EST-MCNC: 143 MG/DL (ref 68–126)
FASTING STATUS PATIENT QL REPORTED: NO
GLUCOSE BLD-MCNC: 85 MG/DL (ref 70–99)
HBA1C MFR BLD: 6.6 % (ref ?–5.7)
OSMOLALITY SERPL CALC.SUM OF ELEC: 293 MOSM/KG (ref 275–295)
POTASSIUM SERPL-SCNC: 2.6 MMOL/L (ref 3.5–5.1)
SODIUM SERPL-SCNC: 138 MMOL/L (ref 136–145)
T4 FREE SERPL-MCNC: 1 NG/DL (ref 0.8–1.7)
TSI SER-ACNC: 9.48 MIU/ML (ref 0.55–4.78)

## 2024-09-05 PROCEDURE — 80048 BASIC METABOLIC PNL TOTAL CA: CPT

## 2024-09-05 PROCEDURE — 84443 ASSAY THYROID STIM HORMONE: CPT

## 2024-09-05 PROCEDURE — 84439 ASSAY OF FREE THYROXINE: CPT

## 2024-09-05 PROCEDURE — 36415 COLL VENOUS BLD VENIPUNCTURE: CPT

## 2024-09-05 PROCEDURE — 83036 HEMOGLOBIN GLYCOSYLATED A1C: CPT

## 2024-09-05 NOTE — PROGRESS NOTES
Nephrology Consult Note    REASON FOR CONSULT: CKD 4    ASSESSMENT/PLAN:      1) CKD 4- SCr 0.7 2019 -> 1.1 2020 -> 1.6 2021 -> 3+ mg/dl currently indicates a linear decline in GFR over the last 5 years accompanied by isolated subnephrotic range proteinuria is consistent with diabetic nephropathy given 20+ year h/o uncontrolled DM (A1C 14) prior to 2020.  She does not have other risk factors for kidney disease; primary glomerular disease such as FSGS or membranous nephropathy is less likely given her modest proteinuria.  IgA nephropathy, MPGN or a nephritic process is also unlikely given her bland urine sediment without microscopic hematuria.  Meds are benign without chronic analgesic or PPI use.  There is no history of obstructive uropathy or urinary retention.  She does not have HIV or hepatitis risk factors.  There are no signs of a systemic process such as vasculitis or autoimmune disease.  She does not have elevated serum globulins or hypercalcemia to suggest a plasma cell dyscrasia. PLAN- d/w pt and  x 40 minutes.  Unfortunately, her CKD \"path\" is clearly established and she will likely reach ESRD within the next 12 months.  Encouraged her to move forward with transplant evaluation ASAP and to maintain focus on DM / BP mgmt. At this point, addition of SGLT2-I will not alter the course of her renal disease. Agree with continuing ARB. To f/u in 6 months.    2) DM 2 since age 28- A1C double digits prior to 2020    3) HTN    4) Severe diabetic nephropathy + retinopathy    5) Probable gastroparesis       HPI:   Ceci Carroll is a 54 year old female with   Chief Complaint   Patient presents with    Consult     Ref'd by Dr. Dolan for CKD     Albina Dolan MD    Very pleasant 54-year-old female presents for evaluation of chronic kidney disease.  Please see above for further details.    ROS:    Denies fever/chills  Denies wt loss/gain  Denies HA or visual changes  Denies CP or palpitations  Denies  SOB/cough/hemoptysis  Denies abd or flank pain  Denies N/V/D  Denies change in urinary habits or gross hematuria  Denies LE edema  Denies skin rashes/myalgias/arthralgias    PMH:  Past Medical History:    Diabetes (HCC)    Eye disorder    Hypertension    Non-healing non-surgical wound       PSH:  Past Surgical History:   Procedure Laterality Date    Back surgery      [place] lower back surgery          Cholecystectomy      Laparoscopic cholecystectomy         Medications (Active prior to today's visit):  Current Outpatient Medications   Medication Sig Dispense Refill    atorvastatin 40 MG Oral Tab Take 1 tablet (40 mg total) by mouth nightly. 90 tablet 1    levothyroxine 50 MCG Oral Tab Take 1 tablet (50 mcg total) by mouth before breakfast. 90 tablet 0    losartan 25 MG Oral Tab Take 1 tablet (25 mg total) by mouth daily. 90 tablet 1    Levonorgestrel 20 MCG/DAY Intrauterine IUD 1 Intra Uterine Device by Intrauterine route one time.      Continuous Blood Gluc Sensor (FREESTYLE TERRY 3 SENSOR) Does not apply Misc 1 each every 14 (fourteen) days. 2 each 2    Continuous Blood Gluc  (FREESTYLE TERRY 3 READER) Does not apply Misc 1 each As Directed. Use with Terry 3 sensors to monitor blood glucose as directed. 1 each 0    Insulin NPH & Regular 70/30 (NOVOLIN 70/30 RELION) (70-30) 100 UNIT/ML Subcutaneous Suspension Inject 50 Units into the skin 2 (two) times daily before meals. 30 mL 2       Allergies:  No Known Allergies    Social History:  Social History     Socioeconomic History    Marital status:    Tobacco Use    Smoking status: Never    Smokeless tobacco: Never   Vaping Use    Vaping status: Never Used   Substance and Sexual Activity    Alcohol use: Never    Drug use: Not Currently   Other Topics Concern    Caffeine Concern Yes     Comment: 1 can of Coke daily    Exercise No        Family History:  Denies family history of kidney disease.    PHYSICAL EXAM:   /56 (BP Location: Left  arm, Patient Position: Sitting)   Wt 220 lb (99.8 kg)   BMI 40.24 kg/m²    Wt Readings from Last 3 Encounters:   09/05/24 220 lb (99.8 kg)   05/20/24 220 lb (99.8 kg)   11/22/23 185 lb (83.9 kg)     General: Alert and oriented in no apparent distress.  HEENT: No scleral icterus, MMM  Neck: Supple, no LUIS or thyromegaly  Cardiac: Regular rate and rhythm, S1, S2 normal, no murmur or rub  Lungs: Clear without wheezes, rales, rhonchi.    Abdomen: Soft, non-tender. + bowel sounds, no palpable organomegaly  Extremities: Without clubbing, cyanosis or edema.  Neurologic:  normal affect, cranial nerves grossly intact, moving all extremities  Skin: Warm and dry, no rashes        Jacque Allan MD  9/5/2024  3:50 PM

## 2024-09-06 ENCOUNTER — TELEPHONE (OUTPATIENT)
Facility: CLINIC | Age: 54
End: 2024-09-06

## 2024-09-06 DIAGNOSIS — N18.4 CKD (CHRONIC KIDNEY DISEASE) STAGE 4, GFR 15-29 ML/MIN (HCC): ICD-10-CM

## 2024-09-06 DIAGNOSIS — Z79.4 TYPE 2 DIABETES MELLITUS WITH DIABETIC NEUROPATHY, WITH LONG-TERM CURRENT USE OF INSULIN (HCC): Primary | ICD-10-CM

## 2024-09-06 DIAGNOSIS — E78.5 HYPERLIPIDEMIA, UNSPECIFIED HYPERLIPIDEMIA TYPE: ICD-10-CM

## 2024-09-06 DIAGNOSIS — E11.40 TYPE 2 DIABETES MELLITUS WITH DIABETIC NEUROPATHY, WITH LONG-TERM CURRENT USE OF INSULIN (HCC): Primary | ICD-10-CM

## 2024-09-06 RX ORDER — POTASSIUM CHLORIDE 1500 MG/1
20 TABLET, EXTENDED RELEASE ORAL DAILY
Qty: 30 TABLET | Refills: 0 | Status: SHIPPED | OUTPATIENT
Start: 2024-09-06 | End: 2024-10-06

## 2024-09-06 NOTE — TELEPHONE ENCOUNTER
She is seeing Karlie 9/25. If potassium is replete, I will also order an aldosterone/renin level if this has not been ordered in the past.

## 2024-09-06 NOTE — TELEPHONE ENCOUNTER
Signed. Please let her know she should continue to follow with PCP and nephrologist (Dr. Allan) regarding low potassium. They may recommend further work up/different dose of potassium.

## 2024-09-06 NOTE — TELEPHONE ENCOUNTER
Staff message: Would you mind clarifying if patient is on any potassium supplement? I would recommend repeat potassium levels to confirm levels.      RN phoned patient to discuss above.    Patient denies use of potassium supplement at this time.    Pended possible repeat labs and routed for review.

## 2024-09-06 NOTE — TELEPHONE ENCOUNTER
Please have her increase potassium in her diet and I will start potassium 20meq daily with repeat BMP at her earliest convenience. Patient should also follow up with her PCP regarding low potassium. Symptoms she should keep an eye out for include :  Muscle weakness: Can include cramps, spasms, and damage   Fatigue: Can feel like extreme tiredness   Heart palpitations: Can feel like your heart is skipping beats   Constipation: Can be a symptom of low potassium  Tingling or numbness: Can be a symptom of low potassium   Frequent urination: Can be a symptom of moderate-to-severe hypokalemia   Increased thirst: Can be a symptom of low potassium   Lightheadedness or fainting: Can be a symptom of a large drop in potassium levels     If she notes these symptoms, she is to present to the ED

## 2024-09-06 NOTE — TELEPHONE ENCOUNTER
RN phoned patient and reviewed below note.    Patient stated she had a good understanding of how to move forward and when to present to the ER for evaluation.    Kibin message sent reviewing what was discuss over the phone.     Routed for review.

## 2024-09-25 ENCOUNTER — TELEMEDICINE (OUTPATIENT)
Facility: CLINIC | Age: 54
End: 2024-09-25
Payer: MEDICARE

## 2024-09-25 ENCOUNTER — PATIENT MESSAGE (OUTPATIENT)
Facility: CLINIC | Age: 54
End: 2024-09-25

## 2024-09-25 DIAGNOSIS — I10 HYPERTENSION, UNSPECIFIED TYPE: ICD-10-CM

## 2024-09-25 DIAGNOSIS — E03.9 HYPOTHYROIDISM, UNSPECIFIED TYPE: ICD-10-CM

## 2024-09-25 DIAGNOSIS — Z79.4 TYPE 2 DIABETES MELLITUS WITH DIABETIC NEUROPATHY, WITH LONG-TERM CURRENT USE OF INSULIN (HCC): Primary | ICD-10-CM

## 2024-09-25 DIAGNOSIS — N18.4 CKD (CHRONIC KIDNEY DISEASE) STAGE 4, GFR 15-29 ML/MIN (HCC): ICD-10-CM

## 2024-09-25 DIAGNOSIS — E78.5 HYPERLIPIDEMIA, UNSPECIFIED HYPERLIPIDEMIA TYPE: ICD-10-CM

## 2024-09-25 DIAGNOSIS — E87.6 HYPOKALEMIA: ICD-10-CM

## 2024-09-25 DIAGNOSIS — E11.40 TYPE 2 DIABETES MELLITUS WITH DIABETIC NEUROPATHY, WITH LONG-TERM CURRENT USE OF INSULIN (HCC): Primary | ICD-10-CM

## 2024-09-25 PROCEDURE — 95251 CONT GLUC MNTR ANALYSIS I&R: CPT

## 2024-09-25 PROCEDURE — 99215 OFFICE O/P EST HI 40 MIN: CPT

## 2024-09-25 NOTE — PATIENT INSTRUCTIONS
Follow up plan:  With ORLANDO Borrero: Return in about 6 weeks (around 11/6/2024) for scheduled follow up with Dr. House.    --> Please repeat BMP lab ordered by Dr. House within the next few days to check on your potassium    - insulin 70/30 48 units in AM and 52 units with dinner --> 50u w/ dinner   - start Ozempic patient assistance program process-- once we start and you get the medication from us, we will reduce your insulin     Thyroid:   - incr 50mcg daily --> 50 mcg M-Sa + 100 mcg Manzanares, first thing on empty stomach   - repeat labs in 2mo prior to next visit with Dr. House     Updated/current diabetes medication instructions:  Diabetic Medications               Insulin NPH & Regular 70/30 (NOVOLIN 70/30 RELION) (70-30) 100 UNIT/ML Subcutaneous Suspension Inject 50 Units into the skin 2 (two) times daily before meals.            Office phone number: [phone]; phones are open Monday-Friday 8:30-4:30.   Thank you for visiting our office. We look forward to working with you to reach your health goals. As a reminder, if you need refills, please request early so there is enough time to process the request. We ask that you provide at least 5 days' notice before a refill is due, so there is time to send a request to pharmacy, process the refill, and ensure there are no other problems with obtaining the medication (backorders, prior authorization paperwork, etc). Routine refills will not be addressed on weekends, so please submit these requests during the week.    Blood sugar targets:  Before breakfast: , 2 hours after meals: <180 (preferably <150), A1C goal: <7.0%  Time in Range goal is higher than 80% if using a continuous glucose monitor (Dexcom or Claudia).  Time in Range can be found within the Dexcom G7 lino, on Clarity if using Dexcom G6, or on LibreView if using Claudia.    HOW TO TREAT LOW BLOOD SUGAR (Hypoglycemia)  Low blood sugar= Less than 70, or if you start to have symptoms  (below)  Symptoms: Shaking or trembling, fast heart rate, extreme hunger, sweating, confusion/difficulty concentrating, dizziness.    How to treat a low blood sugar if you are able to eat/drink: The Rule of 15  If you are using continuous glucose monitor that says you are low, but you do not have any symptoms, verify on fingerstick that your blood sugar is actually low before treating.   Eat 15 grams of carbs (see examples below)  Check your blood sugar after 15 minutes. If it’s still below your target range, have another serving.   Repeat these steps until it’s in your target range. Once it’s in range, if you're nervous about your sugar going low again, have a protein source (ie, a spoonful of peanut butter).   If you have a CGM you want to look for how your arrow has changed. If you arrow is pointed up or sideways after 15 min, give your CGM more time OR check with a finger stick. Try not to eat more food until at least 15 min after the first BG check - otherwise you risk having a rebound high.  If you are experiencing symptoms and you are unable to check your blood glucose for any reason, treat the hypoglycemia.  If someone has a low blood sugar and is unconscious: Don’t hesitate to call 911. If someone is unconscious and glucagon is not available or someone does not know how to use it, call 911 immediately.    To treat a low, I recommend you carry with you easy, pre-portioned treatment for low blood sugars that are 15G of carbs:   - Children sized squeeze pouch applesauce (high fiber + carbs help prevent too high of a spike)  - Small children's sized juicebox- 15g carb --> 4oz juice box  - Glucose tablets from [employer], you can find them near diabetes supplies --> Note, you will need to eat 3-4 tablets to get to 15g of carbs  - Children sized fruit snack pack- look for one with 15 grams of total carbohydrate  - Choice of how to treat your low is important. Complex carbs, or foods that contain fats along  with carbs (like chocolate) can slow the absorption of glucose and should NOT be used to treat an emergency low

## 2024-09-25 NOTE — PROGRESS NOTES
[facility] Note - Telemedicine    Ceci KINSEY PatiLew verbally consents to a Telemedicine (Audio + Video) visit on 9/25/2024. The visit was conducted in a private patient room.       Subjective:   Initial HPI 11/2023- with Dr. House  Diabetes was diagnosed at age 27.  Initially started on Metformin. She did endorse noncompliance at that time. She was started on insulin around her early 30s.   Has tried lantus, novolog. Currently on Novolin 70/30 50u BID  In 2015, she had a bad infection and stopped all her meds at that time; was hospitalized. Restarted therapy after discharge.  In 2019, A1c of 15% and noted slower gait and weakness. Diagnosed with groin infection and gangrenous GB. States she was very sick and after that she became very serious about her diabetes management.   She noted worsening neuropathy after 2019. Her A1c was 6.1% in 2022 while on Novolin. Made significant lifestyle changes and was compliant with medications.  Today, A1c POC is 8.4%  Uses wheelchair and roller to get around; feels she is getting stronger in terms of her right drop foot. Does not drive  Family hx: mother with DM, brother with DM (passed away), sister with DM  Polyuria/polydipsia:  No Going every 4-5 hours (which is better than previously)  Blurred vision:  Yes:       Diabetes Treatment history   Duration of therapy with insulin: since age 32   Metformin: not compliant  Lantus/Humalog: too expensive     Current Regimen for diabetes:   Oral/Injectable medications: denies   Basal:  Novolin 50u BID . Injects in abdomen  Lantus/novolog was more expensive   Prandial:  denies  Missed doses: endorses compliance     Interim hx:  2/2024- with Dr. House  Noted glucose ranging from 300 to 50s on darío; started on darío around 12/2023  Has had a few sensors that have been faulty   Noted initially was taking novolin split dosing after eating and then switched to 50 BID and prior to meal dosing and noted numbers improved     She takes her novolin around 730/8A and 4P  Denies bruising, abdominal striae, excessive weight gain, proximal muscle weakness      March 2024- with [facility] ([facility]). Here to f/u on lab results.  3/2024- cpeptide completed, 3.4  LOV, she resumed hydrochlorothiazide and notes her BG spiked to 439 last Thursday and gradually incr'd. So she discontinued the medication. Previously was taking losartan, thought it had SE of cough, but is opening to restarting if necessary. Pt checked BP during visit on home cuff and it was 132/72.  She is hoping for an upcoming cataract removal surgery, and needs surgical clearance. Hasn't established w/ a PCP yet.   Current DM meds: 70/30 50u BID, darío 3 with reader      May 2024- with Dr. House-   Established care with PCP, currently on Losartan 25 mg daily   Had cataract surgery 4/16 and 4/30 and notes significant improvement   Reviewed darío, in range 87% time  Is planning on following with nephrology   Continues to note numbness in her feet and foot drop, discussed seeing neurology     9/25/2024-w/ Karlie PERRY. Telemed appointment. Last A1c value was 6.6% done 9/5/2024.    Overall doing well, trying to cut down on carbs and has seen the difference in BG when she's eating lower carb.   She saw nephrology who recommended weight management/ozempic, so she's interested in pursuing this now  BMP completed, K was low, started K repletion from our office  DM meds at visit: insulin 70/30 48 units in AM and 52 units with dinner   Thyroid: TFTs off, was instructed to incr her Sunday dose but forgot       Continuous Glucose Monitoring Interpretation  Ceci TIO Carroll has undergone continuous glucose monitoring with their CGM.  The blood glucose tracings were evaluated for two weeks prior to office visit.   Blood glucose tracings demonstrated areas of hyperglycemia post-meal, particularly post-dinner  There were occasional hypoglycemia, particularly overnight on 9/22- was stable and she may have  slept through it as it doesn't look treated .          History/Other:    Allergies, PMH, SocHx and FHx reviewed and updated as appropriate in Epic on   No outpatient medications have been marked as taking for the 9/25/24 encounter (Telemedicine) with Maria Elena Pham APN.     No Known Allergies  Current Outpatient Medications   Medication Sig Dispense Refill    Potassium Chloride ER 20 MEQ Oral Tab CR Take 20 mEq by mouth daily. 30 tablet 0    atorvastatin 40 MG Oral Tab Take 1 tablet (40 mg total) by mouth nightly. 90 tablet 1    levothyroxine 50 MCG Oral Tab Take 1 tablet (50 mcg total) by mouth before breakfast. 90 tablet 0    losartan 25 MG Oral Tab Take 1 tablet (25 mg total) by mouth daily. 90 tablet 1    Levonorgestrel 20 MCG/DAY Intrauterine IUD 1 Intra Uterine Device by Intrauterine route one time.      Continuous Blood Gluc Sensor (FREESTYLE TERRY 3 SENSOR) Does not apply Misc 1 each every 14 (fourteen) days. 2 each 2    Continuous Blood Gluc  (FREESTYLE TERRY 3 READER) Does not apply Misc 1 each As Directed. Use with Terry 3 sensors to monitor blood glucose as directed. 1 each 0    Insulin NPH & Regular 70/30 (NOVOLIN 70/30 RELION) (70-30) 100 UNIT/ML Subcutaneous Suspension Inject 50 Units into the skin 2 (two) times daily before meals. 30 mL 2     Past Medical History:    Diabetes (HCC)    Eye disorder    Hypertension    Non-healing non-surgical wound     Family History   Problem Relation Age of Onset    Diabetes Mother     Heart Disorder Mother     Diabetes Sister     Stroke Sister     Diabetes Brother      Social history: Reviewed.      ROS/Exam    REVIEW OF SYSTEMS: Ten point review of systems has been performed and is otherwise negative and/or non-contributory, except as described above.     VITALS  There were no vitals filed for this visit.    Wt Readings from Last 6 Encounters:   09/05/24 220 lb (99.8 kg)   05/20/24 220 lb (99.8 kg)   11/22/23 185 lb (83.9 kg)   06/15/19 205 lb 6.4 oz  (93.2 kg)   05/18/19 200 lb (90.7 kg)   05/18/19 200 lb (90.7 kg)       PHYSICAL EXAM  Limited due to telemedicine encounter    Constitutional Not in acute distress  Pulmonary: no wheezing heard  No coughing on the phone. Speaking in full sentences   Neurological:  Alert and oriented to person, place and time.   Psychiatric: Normal affect, mood and behavior appropriate      Labs/Imaging: Pertinent imaging reviewed.    Overall glucose control:  Lab Results   Component Value Date    A1C 6.6 (H) 09/05/2024    A1C 7.3 (H) 05/20/2024    A1C 8.4 (A) 11/22/2023    A1C >14.0 06/15/2019    A1C >14.0 05/18/2019       Supplementary Documentation:   -Surveillance for Diabetes Complications & Risks  Foot exam/neuropathy: No data recorded  Retinopathy screening: Last Dilated Eye Exam: 02/09/24  Eye Exam shows Diabetic Retinopathy?: No      Assessment & Plan:     ICD-10-CM    1. Type 2 diabetes mellitus with diabetic neuropathy, with long-term current use of insulin ([facility])  E11.40 GLUC MNTR CONT REC FROM NTRSTL TISS FLU PHYS I&R    Z79.4 Microalb/Creat Ratio, Random Urine [E]      2. CKD (chronic kidney disease) stage 4, GFR 15-29 ml/min ([facility])  N18.4       3. Hypertension, unspecified type  I10       4. Hyperlipidemia, unspecified hyperlipidemia type  E78.5       5. Hypothyroidism, unspecified type  E03.9 TSH and Free T4 [E]      6. Hypokalemia  E87.6           Ceci KINSEY Glen is a pleasant 54 year old female here for evaluation of:    #Diabetes- PMHx of Type 2 diabetes mellitus diagnosed age 27. 2/2024 cpeptide 3.9.  Managed on insulin only regimen. Previously recommended GLP, was not interested, but after speaking with nephrology would like to pursue. Will attempt coverage via [employer] patient assistance program, with goal of GLP titration and insulin wean. Given CKD status not a candidate for SGLT2i at this point.     Last A1c value was 6.6% done 9/5/2024. Goal <7%. Importance of better glucose control in preventing  onset/progression of end-organ damage discussed, as well as goals of therapy and clinical significance of A1C.     - med changes:  - decr dinner dose: insulin 70/30 48 units in AM / 52 units with dinner --> 50u w/ dinner   - start ozempic 0.25mg weekly via patient assistance program (titrate insulin PRN)  - continue Freestyle Claudia 3 CGM    - would avoid SGLT2i given GFR <20, new start not recommended  - avoid metformin, avoid DPP4i other than linagliptin given GFR <20  - continue to check BG 3-4x/day using glucometer or CGM  - Discussed adding GLP-1 to current medication regimen. Discussed action, risk vs benefit, dosing, and potential side effects. Patient denies hx of pancreatitis, gastroparesis, or personal or family hx of medullary thyroid CA or MEN 2. - patient is not pregnant or intending to become pregnant   -See above header \"Supplementary Documentation\" for surveillance for diabetes complications & risks    #CKD stage 4  - follows w/ Dr. Allan nephrology  - BG control as above  - due for repeat MAB, ordered  - Taking ARB  Lab Results   Component Value Date    EGFRCR 18 (L) 09/05/2024    MICROALBCREA 464.7 (H) 06/15/2019      #Hypertension  - unable to assess- video visit   BP Readings from Last 1 Encounters:   09/05/24 118/56   BP Meds: losartan Tabs - 25 MG     #Hyperlipidemia  - LDL is not to goal <70  - consider intensifying?  Lab Results   Component Value Date     (H) 02/19/2024    TRIG 100 02/19/2024   Cholesterol Meds: atorvastatin Tabs - 40 MG      #Hypothyroidism  2/2024 TSH 11.10, fT4 0.80, restarted LT4 50mcg daily.   5/2024 TSH 5.22  9/2024 TSH 9, still taking 50mcg daily, forgot to incr dose    - increase LT4 50mcg daily --> 50 mcg M-Sa + 100 mcg Manzanares, first thing on empty stomach   - labs before next visit    #Hypokalemia  Started 20meQ of K recently after low on BMP. Due for repeat BMP, reminded to complete and follow up with PCP or nephrology re: next steps    Return in about 6 weeks  (around 11/6/2024) for scheduled follow up with Dr. House.    A total of 40 minutes was spent today on obtaining history, reviewing pertinent labs, evaluating patient, providing multiple treatment options, reinforcing diet/exercise and compliance, and completing documentation.       9/25/2024  ORLANDO Borrero    Note to patient: The 21 Century Cures Act makes medical notes like these available to patients in the interest of transparency. However, be advised this is a medical document. It is intended as peer to peer communication. It is written in medical language and may contain abbreviations or verbiage that are unfamiliar. It may appear blunt or direct. Medical documents are intended to carry relevant information, facts as evident, and the clinical opinion of the practitioner.

## 2024-09-25 NOTE — PROGRESS NOTES
[facility]     HISTORY OF PRESENT ILLNESS   Ceci Carroll is a 54 year old female with PMHx significant for HTN, T2DM who presents for  T2DM care.    Subjective:    Initial HPI 11/2023- with Dr. House  Diabetes was diagnosed at age 27.  Initially started on Metformin. She did endorse noncompliance at that time. She was started on insulin around her early 30s.   Has tried lantus, novolog. Currently on Novolin 70/30 50u BID  In 2015, she had a bad infection and stopped all her meds at that time; was hospitalized. Restarted therapy after discharge.  In 2019, A1c of 15% and noted slower gait and weakness. Diagnosed with groin infection and gangrenous GB. States she was very sick and after that she became very serious about her diabetes management.   She noted worsening neuropathy after 2019. Her A1c was 6.1% in 2022 while on Novolin. Made significant lifestyle changes and was compliant with medications.  Today, A1c POC is 8.4%  Uses wheelchair and roller to get around; feels she is getting stronger in terms of her right drop foot. Does not drive  Family hx: mother with DM, brother with DM (passed away), sister with DM  Polyuria/polydipsia:  No Going every 4-5 hours (which is better than previously)  Blurred vision:  Yes:       Diabetes Treatment history   Duration of therapy with insulin: since age 32   Metformin: not compliant  Lantus/Humalog: too expensive    Current Regimen for diabetes:   Oral/Injectable medications: denies   Basal:  Novolin 50u BID . Injects in abdomen  Lantus/novolog was more expensive   Prandial:  denies  Missed doses: endorses compliance     Interim hx:  2/2024- with Dr. House  Noted glucose ranging from 300 to 50s on darío; started on darío around 12/2023  Has had a few sensors that have been faulty   Noted initially was taking novolin split dosing after eating and then switched to 50 BID and prior to meal dosing and noted numbers improved    She takes her novolin  around 730/8A and 4P  Denies bruising, abdominal striae, excessive weight gain, proximal muscle weakness     March 2024- with [facility] ([place]). Here to f/u on lab results.  3/2024- cpeptide completed, 3.4  LOV, she resumed hydrochlorothiazide and notes her BG spiked to 439 last Thursday and gradually incr'd. So she discontinued the medication. Previously was taking losartan, thought it had SE of cough, but is opening to restarting if necessary. Pt checked BP during visit on home cuff and it was 132/72.  She is hoping for an upcoming cataract removal surgery, and needs surgical clearance. Hasn't established w/ a PCP yet.   Current DM meds: 70/30 50u BID, darío 3 with reader     May 2024- with Dr. House-   Established care with PCP, currently on Losartan 25 mg daily   Had cataract surgery 4/16 and 4/30 and notes significant improvement   Reviewed darío, in range 87% time  Is planning on following with nephrology   Continues to note numbness in her feet and foot drop, discussed seeing neurology    9/25/2024-w/ Karlie PIERREN. Telemed appointment. Last A1c value was 6.6% done 9/5/2024.    Overall doing well, trying to cut down on carbs and has seen the difference in BG when she's eating lower carb.   She saw nephrology who recommended weight management/ozempic, so she's interested in pursuing this now  BMP completed, K was low, started K repletion from our office  DM meds at visit: insulin 70/30 48 units in AM and 52 units with dinner   Thyroid: TFTs off, was instructed to incr her Sunday dose but forgot      Continuous Glucose Monitoring Interpretation  Ceci TIO Carroll has undergone continuous glucose monitoring with their CGM.  The blood glucose tracings were evaluated for two weeks prior to office visit.   Blood glucose tracings demonstrated areas of hyperglycemia post-meal, particularly post-dinner  There were occasional hypoglycemia, particularly overnight on 9/22- was stable and she may have slept through it as it  doesn't look treated .        Last A1c value was 6.6% done 9/5/2024.   Previously trialed/failed DM meds: lantus/novolog- too expensive    History/Other:   REVIEW OF SYSTEMS  Ten point review of systems has been performed and is otherwise negative and/or non-contributory, except as described above.     Medications:     Current Outpatient Medications:     Potassium Chloride ER 20 MEQ Oral Tab CR, Take 20 mEq by mouth daily., Disp: 30 tablet, Rfl: 0    atorvastatin 40 MG Oral Tab, Take 1 tablet (40 mg total) by mouth nightly., Disp: 90 tablet, Rfl: 1    levothyroxine 50 MCG Oral Tab, Take 1 tablet (50 mcg total) by mouth before breakfast., Disp: 90 tablet, Rfl: 0    losartan 25 MG Oral Tab, Take 1 tablet (25 mg total) by mouth daily., Disp: 90 tablet, Rfl: 1    Levonorgestrel 20 MCG/DAY Intrauterine IUD, 1 Intra Uterine Device by Intrauterine route one time., Disp: , Rfl:     Continuous Blood Gluc Sensor (FREESTYLE TERRY 3 SENSOR) Does not apply Misc, 1 each every 14 (fourteen) days., Disp: 2 each, Rfl: 2    Continuous Blood Gluc  (FREESTYLE TERRY 3 READER) Does not apply Misc, 1 each As Directed. Use with Terry 3 sensors to monitor blood glucose as directed., Disp: 1 each, Rfl: 0    Insulin NPH & Regular 70/30 (NOVOLIN 70/30 RELION) (70-30) 100 UNIT/ML Subcutaneous Suspension, Inject 50 Units into the skin 2 (two) times daily before meals., Disp: 30 mL, Rfl: 2     Allergies:   No Known Allergies    Social History:   Social History     Socioeconomic History    Marital status:    Tobacco Use    Smoking status: Never    Smokeless tobacco: Never   Vaping Use    Vaping status: Never Used   Substance and Sexual Activity    Alcohol use: Never    Drug use: Not Currently   Other Topics Concern    Caffeine Concern Yes     Comment: 1 can of Coke daily    Exercise No       Medical History:   Reviewed    Surgical history:   Past Surgical History:   Procedure Laterality Date    Back surgery      [place] lower back  surgery          Cholecystectomy      Laparoscopic cholecystectomy         Objective:   There were no vitals filed for this visit.     PHYSICAL EXAM     Constitutional Not in acute distress  Pulmonary: no wheezing heard  No coughing on the phone. Speaking in full sentences   Neurological:  Alert and oriented to person, place and time.   Psychiatric: Normal affect, mood and behavior appropriate      Assessment & Plan:   (E11.40,  Z79.4) Type 2 diabetes mellitus with diabetic neuropathy, with long-term current use of insulin ([facility])  (primary encounter diagnosis)  (E78.5) Hyperlipidemia, unspecified hyperlipidemia type  (N18.4) CKD (chronic kidney disease) stage 4, GFR 15-29 ml/min ([facility])  (R80.9) Microalbuminuria  Plan:   Last A1c value was 6.6% done 2024,is not to goal <7% but improved from  8.4% done 2023. Previously discussed that given preserved cpeptide, it would be reasonable to consider a transition off of insulin by utilizing GLP1a agent. Cost has previously been a  barrier in the past, but she does not want to add more medication. She feels her 70//30 regimen is working well  We reviewed her claudia and made some dose changes to 70/30    We discussed the importance of glycemic control to prevent complications of diabetes. We discussed the importance of SBGM and consistent, low carb diet as well as moderate exercise as well.  We also discussed the complications of diabetes include retinopathy, neuropathy, nephropathy and cardiovascular disease.      - change insulin 70/30 50u BID --> 48 units in AM and 52 units with dinner   - no strict CI for GLP1a, cpeptide preserved and would assist w/ weight loss and insulin weaning, but pt declined for now  - would avoid SGLT2i given GFR <20, new start not recommended  - avoid metformin, avoid DPP4i other than linagliptin given GFR <20  - continue Freestyle Claudia 3 CGM with reader  - continue to check BG 3-4x/day using glucometer or CGM  - Discussed adding  GLP-1 to current medication regimen. Discussed action, risk vs benefit, dosing, and potential side effects. Patient denies hx of pancreatitis, gastroparesis, or personal or family hx of medullary thyroid CA or MEN 2. - patient is not*** pregnant or intending to become pregnant      DM quality metrics:  - Ophtho: Last Dilated Eye Exam: 02/09/24   Eye Exam shows Diabetic Retinopathy?: No  - routed to staff to call pt/obtain records  - BP - checked during VV, stable, instructed to f/u with PCP  - LDL is not at goal 2/2024, taking statin , refill sent today  - MAB needs repeat- ordered 2/2024, reminded, would benefit from ARB for renoprotection, d/w PCP  - Neuropathy/ Foot exam: No data recorded  - CAD: none    (E03.9) Hypothyroidism, unspecified type  Plan: levothyroxine 50 MCG Oral Tab  2/2024 TSH 11.10, fT4 0.80, restarted LT4 50mcg daily. 5/2024 TSH 5.22  - repeat TFTs after 8/2024  -   - increase LT4 50mcg daily --> 50 mcg M-Sa + 100 mcg Manzanares, first thing on empty stomach     No follow-ups on file.    Time was spent today on obtaining history, reviewing pertinent labs, evaluating patient, providing multiple treatment options and managing prescription medications, reinforcing diet/exercise and compliance, and completing documentation.      9/25/2024  ORLANDO Borrero

## 2024-09-26 ENCOUNTER — TELEPHONE (OUTPATIENT)
Facility: CLINIC | Age: 54
End: 2024-09-26

## 2024-09-27 ENCOUNTER — MED REC SCAN ONLY (OUTPATIENT)
Facility: CLINIC | Age: 54
End: 2024-09-27

## 2024-10-01 DIAGNOSIS — I10 PRIMARY HYPERTENSION: ICD-10-CM

## 2024-10-01 DIAGNOSIS — E78.5 HYPERLIPIDEMIA, UNSPECIFIED HYPERLIPIDEMIA TYPE: ICD-10-CM

## 2024-10-01 DIAGNOSIS — E11.40 TYPE 2 DIABETES MELLITUS WITH DIABETIC NEUROPATHY, WITH LONG-TERM CURRENT USE OF INSULIN (HCC): ICD-10-CM

## 2024-10-01 DIAGNOSIS — E03.9 HYPOTHYROIDISM, UNSPECIFIED TYPE: ICD-10-CM

## 2024-10-01 DIAGNOSIS — I10 HYPERTENSION, UNSPECIFIED TYPE: Primary | ICD-10-CM

## 2024-10-01 DIAGNOSIS — N18.4 CKD (CHRONIC KIDNEY DISEASE) STAGE 4, GFR 15-29 ML/MIN (HCC): ICD-10-CM

## 2024-10-01 DIAGNOSIS — Z79.4 TYPE 2 DIABETES MELLITUS WITH DIABETIC NEUROPATHY, WITH LONG-TERM CURRENT USE OF INSULIN (HCC): ICD-10-CM

## 2024-10-01 RX ORDER — POTASSIUM CHLORIDE 1500 MG/1
1 TABLET, EXTENDED RELEASE ORAL DAILY
Qty: 30 TABLET | Refills: 0 | Status: SHIPPED | OUTPATIENT
Start: 2024-10-01

## 2024-10-01 NOTE — TELEPHONE ENCOUNTER
LOV: 4/11/2024 with Dr. Dolan   RTC: 2-3 months  Last Relevant Labs: 9/5/2024  Filled: 4/11/2024    #90 with 1 refill    Future Appointments   Date Time Provider Department Center   11/8/2024 11:00 AM Chayo House DO [id number]   3/5/2025 10:30 AM Jacque Allan MD [facility]

## 2024-10-01 NOTE — TELEPHONE ENCOUNTER
Patient had started Potassium after BMP result from 9/5/24. Pt has not gotten repeat BMP.    Phoned patient- states she is going to go on Saturday for a repeat lab. Reviewed importance of repeat lab- potassium needs to be re-checked.    Routing on to Dr. House for review.

## 2024-10-02 RX ORDER — LOSARTAN POTASSIUM 25 MG/1
25 TABLET ORAL DAILY
Qty: 90 TABLET | Refills: 0 | Status: SHIPPED | OUTPATIENT
Start: 2024-10-02

## 2024-10-04 RX ORDER — LEVOTHYROXINE SODIUM 50 UG/1
TABLET ORAL
Qty: 96 TABLET | Refills: 1 | Status: SHIPPED | OUTPATIENT
Start: 2024-10-04

## 2024-10-05 ENCOUNTER — LAB ENCOUNTER (OUTPATIENT)
Dept: LAB | Age: 54
End: 2024-10-05
Payer: MEDICARE

## 2024-10-05 DIAGNOSIS — Z79.4 TYPE 2 DIABETES MELLITUS WITH DIABETIC NEUROPATHY, WITH LONG-TERM CURRENT USE OF INSULIN (HCC): ICD-10-CM

## 2024-10-05 DIAGNOSIS — E11.40 TYPE 2 DIABETES MELLITUS WITH DIABETIC NEUROPATHY, WITH LONG-TERM CURRENT USE OF INSULIN (HCC): ICD-10-CM

## 2024-10-05 DIAGNOSIS — N18.4 CKD (CHRONIC KIDNEY DISEASE) STAGE 4, GFR 15-29 ML/MIN (HCC): ICD-10-CM

## 2024-10-05 DIAGNOSIS — E78.5 HYPERLIPIDEMIA, UNSPECIFIED HYPERLIPIDEMIA TYPE: ICD-10-CM

## 2024-10-05 LAB
ANION GAP SERPL CALC-SCNC: 7 MMOL/L (ref 0–18)
BUN BLD-MCNC: 44 MG/DL (ref 9–23)
CALCIUM BLD-MCNC: 8.4 MG/DL (ref 8.7–10.4)
CHLORIDE SERPL-SCNC: 107 MMOL/L (ref 98–112)
CO2 SERPL-SCNC: 21 MMOL/L (ref 21–32)
CREAT BLD-MCNC: 3.65 MG/DL
EGFRCR SERPLBLD CKD-EPI 2021: 14 ML/MIN/1.73M2 (ref 60–?)
FASTING STATUS PATIENT QL REPORTED: YES
GLUCOSE BLD-MCNC: 100 MG/DL (ref 70–99)
OSMOLALITY SERPL CALC.SUM OF ELEC: 291 MOSM/KG (ref 275–295)
POTASSIUM SERPL-SCNC: 5.1 MMOL/L (ref 3.5–5.1)
SODIUM SERPL-SCNC: 135 MMOL/L (ref 136–145)

## 2024-10-05 PROCEDURE — 80048 BASIC METABOLIC PNL TOTAL CA: CPT

## 2024-10-05 PROCEDURE — 36415 COLL VENOUS BLD VENIPUNCTURE: CPT

## 2024-10-16 ENCOUNTER — TELEPHONE (OUTPATIENT)
Facility: CLINIC | Age: 54
End: 2024-10-16

## 2024-10-16 DIAGNOSIS — E87.6 HYPOKALEMIA: Primary | ICD-10-CM

## 2024-10-16 DIAGNOSIS — Z79.4 TYPE 2 DIABETES MELLITUS WITH DIABETIC NEUROPATHY, WITH LONG-TERM CURRENT USE OF INSULIN (HCC): ICD-10-CM

## 2024-10-16 DIAGNOSIS — E11.40 TYPE 2 DIABETES MELLITUS WITH DIABETIC NEUROPATHY, WITH LONG-TERM CURRENT USE OF INSULIN (HCC): ICD-10-CM

## 2024-10-16 DIAGNOSIS — R80.9 MICROALBUMINURIA: ICD-10-CM

## 2024-10-16 DIAGNOSIS — N18.4 CKD (CHRONIC KIDNEY DISEASE) STAGE 4, GFR 15-29 ML/MIN (HCC): ICD-10-CM

## 2024-10-16 DIAGNOSIS — I10 HYPERTENSION, UNSPECIFIED TYPE: ICD-10-CM

## 2024-10-16 DIAGNOSIS — E03.9 HYPOTHYROIDISM, UNSPECIFIED TYPE: ICD-10-CM

## 2024-10-16 DIAGNOSIS — E78.5 HYPERLIPIDEMIA, UNSPECIFIED HYPERLIPIDEMIA TYPE: ICD-10-CM

## 2024-10-16 NOTE — TELEPHONE ENCOUNTER
TE continued from results management note dated- 10/11/24.    Per Dr. House:  \"Please have her start calcium 600 mg daily and increase dietary calcium.  She is to cut down to 10 mEq of potassium daily.  Please have her complete her aldosterone and renin levels with repeat BMP and I will discuss next steps at her follow-up visit\".    RN phoned patient; patient made aware of above orders/response per Dr. House. Patient to take calcium 600 mg OTC daily and increase calcium dietary intake (foods/drinks rich in calcium such as milk, cheese, yogurt and dark green leafy vegetables like kale and broccoli).  (Per [facility] outpatient pharmacist- Potassium 20 meq ER tablets are scored and can be cut in half.)    Patient also made aware doctor wants a BMP done along with the labs already ordered (renin/aldosterone); labs to be done prior to 11/8/24 appointment with Dr. House.    [employer] message reviewing above information also sent to patient.    Patient verbalized understanding of all.    BMP ordered.    Closing encounter.

## 2024-10-18 ENCOUNTER — MED REC SCAN ONLY (OUTPATIENT)
Facility: CLINIC | Age: 54
End: 2024-10-18

## 2024-10-21 ENCOUNTER — TELEPHONE (OUTPATIENT)
Facility: CLINIC | Age: 54
End: 2024-10-21

## 2024-10-21 NOTE — TELEPHONE ENCOUNTER
Visit Notes Request from [facility]   Faxed Last Office Notes to [phone]. Medicare requires visit notes to accurately refect and confirm that the CGM continues to be used.

## 2024-11-02 ENCOUNTER — LAB ENCOUNTER (OUTPATIENT)
Dept: LAB | Age: 54
End: 2024-11-02
Attending: STUDENT IN AN ORGANIZED HEALTH CARE EDUCATION/TRAINING PROGRAM
Payer: MEDICARE

## 2024-11-02 DIAGNOSIS — R80.9 MICROALBUMINURIA: ICD-10-CM

## 2024-11-02 DIAGNOSIS — N18.4 CKD (CHRONIC KIDNEY DISEASE) STAGE 4, GFR 15-29 ML/MIN (HCC): ICD-10-CM

## 2024-11-02 DIAGNOSIS — E03.9 HYPOTHYROIDISM, UNSPECIFIED TYPE: ICD-10-CM

## 2024-11-02 DIAGNOSIS — Z79.4 TYPE 2 DIABETES MELLITUS WITH DIABETIC NEUROPATHY, WITH LONG-TERM CURRENT USE OF INSULIN (HCC): ICD-10-CM

## 2024-11-02 DIAGNOSIS — E78.5 HYPERLIPIDEMIA, UNSPECIFIED HYPERLIPIDEMIA TYPE: ICD-10-CM

## 2024-11-02 DIAGNOSIS — E11.40 TYPE 2 DIABETES MELLITUS WITH DIABETIC NEUROPATHY, WITH LONG-TERM CURRENT USE OF INSULIN (HCC): ICD-10-CM

## 2024-11-02 DIAGNOSIS — I10 HYPERTENSION, UNSPECIFIED TYPE: ICD-10-CM

## 2024-11-02 DIAGNOSIS — E87.6 HYPOKALEMIA: ICD-10-CM

## 2024-11-02 LAB
ANION GAP SERPL CALC-SCNC: 9 MMOL/L (ref 0–18)
BUN BLD-MCNC: 69 MG/DL (ref 9–23)
CALCIUM BLD-MCNC: 8.2 MG/DL (ref 8.7–10.4)
CHLORIDE SERPL-SCNC: 113 MMOL/L (ref 98–112)
CO2 SERPL-SCNC: 14 MMOL/L (ref 21–32)
CREAT BLD-MCNC: 4.26 MG/DL
CREAT UR-SCNC: 39.3 MG/DL
EGFRCR SERPLBLD CKD-EPI 2021: 12 ML/MIN/1.73M2 (ref 60–?)
FASTING STATUS PATIENT QL REPORTED: YES
GLUCOSE BLD-MCNC: 109 MG/DL (ref 70–99)
MICROALBUMIN UR-MCNC: 56.8 MG/DL
MICROALBUMIN/CREAT 24H UR-RTO: 1445.3 UG/MG (ref ?–30)
OSMOLALITY SERPL CALC.SUM OF ELEC: 303 MOSM/KG (ref 275–295)
POTASSIUM SERPL-SCNC: 5.4 MMOL/L (ref 3.5–5.1)
SODIUM SERPL-SCNC: 136 MMOL/L (ref 136–145)
T4 FREE SERPL-MCNC: 1 NG/DL (ref 0.8–1.7)
TSI SER-ACNC: 11.92 UIU/ML (ref 0.55–4.78)

## 2024-11-02 PROCEDURE — 80048 BASIC METABOLIC PNL TOTAL CA: CPT

## 2024-11-02 PROCEDURE — 84244 ASSAY OF RENIN: CPT

## 2024-11-02 PROCEDURE — 82088 ASSAY OF ALDOSTERONE: CPT

## 2024-11-02 PROCEDURE — 82570 ASSAY OF URINE CREATININE: CPT

## 2024-11-02 PROCEDURE — 36415 COLL VENOUS BLD VENIPUNCTURE: CPT

## 2024-11-02 PROCEDURE — 82043 UR ALBUMIN QUANTITATIVE: CPT

## 2024-11-02 PROCEDURE — 84443 ASSAY THYROID STIM HORMONE: CPT

## 2024-11-02 PROCEDURE — 84439 ASSAY OF FREE THYROXINE: CPT

## 2024-11-04 NOTE — PROGRESS NOTES
If she recently decreased to 10 mEq daily then she can continue this dose and I can discuss next steps at her follow-up visit

## 2024-11-08 ENCOUNTER — OFFICE VISIT (OUTPATIENT)
Facility: CLINIC | Age: 54
End: 2024-11-08
Payer: MEDICARE

## 2024-11-08 VITALS
HEART RATE: 88 BPM | DIASTOLIC BLOOD PRESSURE: 64 MMHG | OXYGEN SATURATION: 99 % | HEIGHT: 62 IN | WEIGHT: 208 LBS | SYSTOLIC BLOOD PRESSURE: 122 MMHG | BODY MASS INDEX: 38.28 KG/M2

## 2024-11-08 DIAGNOSIS — E78.5 HYPERLIPIDEMIA, UNSPECIFIED HYPERLIPIDEMIA TYPE: Primary | ICD-10-CM

## 2024-11-08 DIAGNOSIS — E03.9 HYPOTHYROIDISM, UNSPECIFIED TYPE: ICD-10-CM

## 2024-11-08 DIAGNOSIS — R79.89 LOW SERUM CALCIUM: ICD-10-CM

## 2024-11-08 PROCEDURE — 95249 CONT GLUC MNTR PT PROV EQP: CPT | Performed by: STUDENT IN AN ORGANIZED HEALTH CARE EDUCATION/TRAINING PROGRAM

## 2024-11-08 PROCEDURE — 99215 OFFICE O/P EST HI 40 MIN: CPT | Performed by: STUDENT IN AN ORGANIZED HEALTH CARE EDUCATION/TRAINING PROGRAM

## 2024-11-08 RX ORDER — LEVOTHYROXINE SODIUM 75 UG/1
TABLET ORAL
Qty: 90 TABLET | Refills: 1 | Status: SHIPPED | OUTPATIENT
Start: 2024-11-08

## 2024-11-08 NOTE — PROGRESS NOTES
[facility] - Telemedicine    Ceci KINSEY PatiLew verbally consents to a Telemedicine (Audio + Video) visit on 11/8/2024. The visit was conducted in a private patient room.       Subjective:   Initial HPI 11/2023- with Dr. House  Diabetes was diagnosed at age 27.  Initially started on Metformin. She did endorse noncompliance at that time. She was started on insulin around her early 30s.   Has tried lantus, novolog. Currently on Novolin 70/30 50u BID  In 2015, she had a bad infection and stopped all her meds at that time; was hospitalized. Restarted therapy after discharge.  In 2019, A1c of 15% and noted slower gait and weakness. Diagnosed with groin infection and gangrenous GB. States she was very sick and after that she became very serious about her diabetes management.   She noted worsening neuropathy after 2019. Her A1c was 6.1% in 2022 while on Novolin. Made significant lifestyle changes and was compliant with medications.  Today, A1c POC is 8.4%  Uses wheelchair and roller to get around; feels she is getting stronger in terms of her right drop foot. Does not drive  Family hx: mother with DM, brother with DM (passed away), sister with DM  Polyuria/polydipsia:  No Going every 4-5 hours (which is better than previously)  Blurred vision:  Yes:       Diabetes Treatment history   Duration of therapy with insulin: since age 32   Metformin: not compliant  Lantus/Humalog: too expensive     Current Regimen for diabetes:   Oral/Injectable medications: denies   Basal:  Novolin 50u BID . Injects in abdomen  Lantus/novolog was more expensive   Prandial:  denies  Missed doses: endorses compliance     Interim hx:  2/2024- with Dr. House  Noted glucose ranging from 300 to 50s on darío; started on darío around 12/2023  Has had a few sensors that have been faulty   Noted initially was taking novolin split dosing after eating and then switched to 50 BID and prior to meal dosing and noted numbers improved     She takes her novolin around 730/8A and 4P  Denies bruising, abdominal striae, excessive weight gain, proximal muscle weakness      March 2024- with [facility] ([place]). Here to f/u on lab results.  3/2024- cpeptide completed, 3.4  LOV, she resumed hydrochlorothiazide and notes her BG spiked to 439 last Thursday and gradually incr'd. So she discontinued the medication. Previously was taking losartan, thought it had SE of cough, but is opening to restarting if necessary. Pt checked BP during visit on home cuff and it was 132/72.  She is hoping for an upcoming cataract removal surgery, and needs surgical clearance. Hasn't established w/ a PCP yet.   Current DM meds: 70/30 50u BID, darío 3 with reader      May 2024- with Dr. House-   Established care with PCP, currently on Losartan 25 mg daily   Had cataract surgery 4/16 and 4/30 and notes significant improvement   Reviewed darío, in range 87% time  Is planning on following with nephrology   Continues to note numbness in her feet and foot drop, discussed seeing neurology     9/25/2024-w/ Karlie PERRY. Telemed appointment. Last A1c value was 6.6% done 9/5/2024.    Overall doing well, trying to cut down on carbs and has seen the difference in BG when she's eating lower carb.   She saw nephrology who recommended weight management/ozempic, so she's interested in pursuing this now  BMP completed, K was low, started K repletion from our office  DM meds at visit: insulin 70/30 48 units in AM and 52 units with dinner   Thyroid: TFTs off, was instructed to incr her Sunday dose but forgot       Continuous Glucose Monitoring Interpretation  Ceci TIO Carroll has undergone continuous glucose monitoring with their CGM.  The blood glucose tracings were evaluated for two weeks prior to office visit.   Blood glucose tracings demonstrated areas of hyperglycemia post-meal, particularly post-dinner  There were occasional hypoglycemia, particularly overnight on 9/22- was stable and she may have  slept through it as it doesn't look treated .    11/08/24 w/ Dr. MANLEY  Labs: 11/2024 TSH 11.92, free T41.0, glucose 109, creatinine 4.26, EGFR 12, urine microalbumin 1445  Imaging: No recent imaging   Since LOV:    Thyroid: LT4 50 mcg M-Sa + 100 mcg Manzanares  since 3 weeks ago  Other: Potassium 10 meq around 1 week ago     Decreased dinner insulin 70/30 48 units in AM and 50u w/ dinner; patient is currently taking 40/40 due to hypoglycemia    Prescribed ozempic 0.25mg weekly but has not started it yet    Has been cutting down on her carbs around the last few weeks   Continuous Glucose Monitoring Interpretation  Ceci KINSEY Jose AlfredoDerick has undergone continuous glucose monitoring with their CGM.  The blood glucose tracings were evaluated for two weeks prior to office visit.   Blood glucose tracings demonstrated areas of hyperglycemia post-meal, particularly post-lunch and post-dinner, but these are improved compared to her prior OV  There were occasional hypoglycemia, particularly overnight on 11/4- was stable and she slept through it .        History/Other:    Allergies, PMH, SocHx and FHx reviewed and updated as appropriate in Epic on   No outpatient medications have been marked as taking for the 11/8/24 encounter (Office Visit) with Chayo House DO.     No Known Allergies  Current Outpatient Medications   Medication Sig Dispense Refill    levothyroxine 50 MCG Oral Tab Take 1 tablet (50 mcg) Monday-Saturday and 2 tablets (100 mcg) on Sunday in the mornings on an empty stomach. 96 tablet 1    LOSARTAN 25 MG Oral Tab Take 1 tablet by mouth once daily 90 tablet 0    POTASSIUM CHLORIDE ER 20 MEQ Oral Tab CR Take 1 tablet by mouth once daily 30 tablet 0    atorvastatin 40 MG Oral Tab Take 1 tablet (40 mg total) by mouth nightly. 90 tablet 1    Levonorgestrel 20 MCG/DAY Intrauterine IUD 1 Intra Uterine Device by Intrauterine route one time.      Continuous Blood Gluc Sensor (FREESTYLE TERRY 3 SENSOR) Does not apply Misc 1 each  every 14 (fourteen) days. 2 each 2    Continuous Blood Gluc  (FREESTYLE TERRY 3 READER) Does not apply Misc 1 each As Directed. Use with Terry 3 sensors to monitor blood glucose as directed. 1 each 0    Insulin NPH & Regular 70/30 (NOVOLIN 70/30 RELION) (70-30) 100 UNIT/ML Subcutaneous Suspension Inject 50 Units into the skin 2 (two) times daily before meals. 30 mL 2     Past Medical History:    Diabetes (HCC)    Eye disorder    Hypertension    Non-healing non-surgical wound     Family History   Problem Relation Age of Onset    Diabetes Mother     Heart Disorder Mother     Diabetes Sister     Stroke Sister     Diabetes Brother      Social history: Reviewed.      ROS/Exam    REVIEW OF SYSTEMS: Ten point review of systems has been performed and is otherwise negative and/or non-contributory, except as described above.     VITALS  Vitals:    11/08/24 1113   BP: 122/64   Pulse: 88   SpO2: 99%   Weight: 208 lb (94.3 kg)   Height: 5' 2\" (1.575 m)       Wt Readings from Last 6 Encounters:   11/08/24 208 lb (94.3 kg)   09/05/24 220 lb (99.8 kg)   05/20/24 220 lb (99.8 kg)   11/22/23 185 lb (83.9 kg)   06/15/19 205 lb 6.4 oz (93.2 kg)   05/18/19 200 lb (90.7 kg)       PHYSICAL EXAM  Limited due to telemedicine encounter    Constitutional Not in acute distress  Pulmonary: no wheezing heard  No coughing on the phone. Speaking in full sentences   Neurological:  Alert and oriented to person, place and time.   Psychiatric: Normal affect, mood and behavior appropriate      Labs/Imaging: Pertinent imaging reviewed.    Overall glucose control:  Lab Results   Component Value Date    A1C 6.6 (H) 09/05/2024    A1C 7.3 (H) 05/20/2024    A1C 8.4 (A) 11/22/2023    A1C >14.0 06/15/2019    A1C >14.0 05/18/2019       Supplementary Documentation:   -Surveillance for Diabetes Complications & Risks  Foot exam/neuropathy: No data recorded  Retinopathy screening: Last Dilated Eye Exam: 09/25/24  Eye Exam shows Diabetic Retinopathy?:  No      Assessment & Plan:   No diagnosis found.      Ceci Carroll is a pleasant 54 year old female here for evaluation of:    #Diabetes- PMHx of Type 2 diabetes mellitus diagnosed age 27. 2/2024 cpeptide 3.9.  Managed on insulin only regimen. Previously recommended GLP, was not interested, but after speaking with nephrology would like to pursue. Will attempt coverage via [employer] patient assistance program, with goal of GLP titration and insulin wean. Given CKD status not a candidate for SGLT2i at this point.     Last A1c value was 6.6% done 9/5/2024. Goal <7%. Importance of better glucose control in preventing onset/progression of end-organ damage discussed, as well as goals of therapy and clinical significance of A1C.     - med changes:  - Decrease insulin 70/30 40 units in AM / 40u --> 36u w/ dinner   - Continue Ozempic 0.25mg weekly via patient assistance program (titrate insulin PRN)  - continue Freestyle Claudia 3 CGM    - would avoid SGLT2i given GFR <20, new start not recommended  - avoid metformin, avoid DPP4i other than linagliptin given GFR <20  - continue to check BG 3-4x/day using glucometer or CGM  - Discussed adding GLP-1 to current medication regimen. Discussed action, risk vs benefit, dosing, and potential side effects. Patient denies hx of pancreatitis, gastroparesis, or personal or family hx of medullary thyroid CA or MEN 2. - patient is not pregnant or intending to become pregnant   -See above header \"Supplementary Documentation\" for surveillance for diabetes complications & risks    #CKD stage 4  - follows w/ Dr. Allan nephrology  - BG control as above   - Taking ARB  Lab Results   Component Value Date    EGFRCR 12 (L) 11/02/2024    MICROALBCREA 1,445.3 (H) 11/02/2024      #Hypertension  BP Readings from Last 1 Encounters:   11/08/24 122/64   BP Meds: losartan Tabs - 25 MG     #Hyperlipidemia  - LDL is not to goal <70  - consider repeating and if LDL remains above goal then can increase to 80  mg  Lab Results   Component Value Date     (H) 02/19/2024    TRIG 100 02/19/2024   Cholesterol Meds: atorvastatin Tabs - 40 MG      #Hypothyroidism  2/2024 TSH 11.10, fT4 0.80, restarted LT4 50mcg daily.   5/2024 TSH 5.22  9/2024 TSH 9, still taking 50mcg daily, forgot to incr dose  11/2024 TSH 11, currently taking 50 mcg M-Sa + 100 mcg Manzanares for the last 3 months    - increase LT4 75 mcg daily, first thing on empty stomach   - labs before next visit    #Hypokalemia  Continue 10 meq of K  Will discuss next steps once labs result    Due for repeat BMP, reminded to complete and follow up with PCP or nephrology re: next steps    Return in about 3 months (around 2/8/2025).    The above plan was discussed in detail with the patient who verbalized understanding and agreement.      A total of 40 minutes was spent today on obtaining history, reviewing pertinent labs, reviewing relevant pathophysiology with patient, reviewing outside records, evaluating patient, providing multiple treatment options, completing documentation and orders, and communicating with other providers as appropriate.     Chayo House DO  [facility]  11/8/2024     In reviewing this note, please be advised that Dragon Voice Recognition software used to dictate the note may have made errors in recognizing some of the words or phrases.     Note to patient: The 21 Century Cures Act makes medical notes like these available to patients in the interest of transparency. However, be advised this is a medical document. It is intended as peer to peer communication. It is written in medical language and may contain abbreviations or verbiage that are unfamiliar. It may appear blunt or direct. Medical documents are intended to carry relevant information, facts as evident, and the clinical opinion of the practitioner.

## 2024-11-08 NOTE — PATIENT INSTRUCTIONS
Summary of today's visit:  Medication changes:    Decrease insulin 70/30 40 units in AM / 40u --> 36u w/ dinner   Please let us know if you continue to note low sugars, especially overnight   I will be in touch once your last lab results. I will refill your potassium as well at that time.   Thyroid: Increase to 75 mcg daily of levothyroxine and repeat labs in 2 months prior to your follow up visit  Calcium: repeat lab today, I will be in touch with next steps. Preemptively start taking 600 mg of calcium daily         General follow up information:  Please let us know if you require any refills at least 1 week prior to your medication running out. If you do run out of medication, please call our office ASAP to request refills (do not wait until your follow up).   Please call our office if sugars at home are consistently greater than 250 or less than 70 for medication adjustment (do not wait until your follow up appointment).  The on-call pager is for emergencies only. If you are a type 1 diabetic and run out of insulin after business hours 8AM-4PM, you may call the on-call pager for a refill to a 24 hour pharmacy. All other refill requests should be requested during business hours.       Return Visit:  [x]  APN in 6 weeks (Karlie)  [] Video visit  []  In person only  [x]  Physician in 3 months  []  Directions to 1st floor lab    []  Give blood sugar log  []  PAP paperwork for Ozempic/Jardiance (english/Occitan)   []  Diabetes Education:    []  Carb Aware T2DM (60)   []  Carb count refresh T1DM (60)   [] CGM start _____________ (30)   []  Pump 101 (60)   []  Schedule with Rodriguez for ___________ (60)   []  DMBO (60)      HOW TO TREAT LOW BLOOD SUGAR (Hypoglycemia)  Low blood sugar = Less than 70, or if you start to have symptoms  Symptoms: Shaking or trembling, fast heart rate, extreme hunger, sweating, confusion/difficulty concentrating, dizziness    How to treat a low blood sugar if you are able to eat/drink: The Rule of  15/15  If you are using a continuous glucose monitor that says you are low, but you do not have any symptoms, verify on fingerstick that your blood sugar is actually <70 before treating.   Eat 15 grams of carbs (see examples below)  Check your blood sugar after 15 minutes. If it’s still below your target range, have another serving.   Repeat these steps until sugar is >90. Once it’s in range, if you're nervous about your sugar going low again, have a protein source (ie, a spoonful of peanut butter).   If you have a CGM, you want to look for how your arrow has changed. If you arrow is pointed up or sideways after 15 min, give your CGM more time OR check with a finger stick. Try not to eat more food until at least 15 min after the first BG check - otherwise you risk spiking your sugar too high.  If you are experiencing symptoms and you are unable to check your blood glucose for any reason, treat the hypoglycemia.  If someone has a low blood sugar and is unconscious: Don’t hesitate to call 911. Use emergency glucagon. If someone is unconscious and glucagon is not available or someone does not know how to use it, call 911 immediately.     To treat a low, carry with you easy, pre-portioned treatment for low blood sugars that are 15G of carbs:   - Children sized squeeze pouch applesauce (high fiber + carbs help prevent too high of a spike)  - Small children's sized juicebox- 15g carb --> 4oz juice box  - Glucose tablets from [facility], you can find them near diabetes supplies --> Note, you will need to eat 3-4 tablets to get to 15g of carbs  - Child sized fruit snack pack- look for one with 15 grams of total carbohydrate  - Choice of how to treat your low is important. Complex carbs, or foods that contain fats along with carbs (like chocolate) can slow the absorption of glucose and should NOT be used to treat an emergency low.

## 2024-11-10 LAB
ALDOST/RENIN RATIO: 5.3
ALDOSTERONE: 8.5 NG/DL
RENIN ACTIVITY: 1.59 NG/ML/HR

## 2024-11-11 DIAGNOSIS — E87.6 HYPOKALEMIA: Primary | ICD-10-CM

## 2024-11-25 ENCOUNTER — TELEPHONE (OUTPATIENT)
Facility: CLINIC | Age: 54
End: 2024-11-25

## 2024-11-25 NOTE — TELEPHONE ENCOUNTER
24-Patient called into clinic.  Verified name and .  Patient asked to speak with Jennifer RN regarding PAP paperwork.  States he  will be dropping off the paperwork on Wednesday but wanted to know if Rodriguez would be able to go over it with her, but is unable to schedule an appt to do so.  I let patient know I will have Rodriguez give her a call back regarding the paperwork.

## 2024-11-27 NOTE — TELEPHONE ENCOUNTER
Confirmed with patient that we will check paperwork to make sure completed appropriately before  leaves.   will drop off paperwork on Friday AM.  Routing to pool as CRISTIAN

## 2024-12-06 DIAGNOSIS — Z79.4 TYPE 2 DIABETES MELLITUS WITH DIABETIC NEUROPATHY, WITH LONG-TERM CURRENT USE OF INSULIN (HCC): ICD-10-CM

## 2024-12-06 DIAGNOSIS — E11.40 TYPE 2 DIABETES MELLITUS WITH DIABETIC NEUROPATHY, WITH LONG-TERM CURRENT USE OF INSULIN (HCC): ICD-10-CM

## 2024-12-06 DIAGNOSIS — N18.4 CKD (CHRONIC KIDNEY DISEASE) STAGE 4, GFR 15-29 ML/MIN (HCC): ICD-10-CM

## 2024-12-06 DIAGNOSIS — E78.5 HYPERLIPIDEMIA, UNSPECIFIED HYPERLIPIDEMIA TYPE: ICD-10-CM

## 2024-12-06 NOTE — TELEPHONE ENCOUNTER
LOV: 11/8/24    RTC: 3 months     FU: scheduled with Karlie PERRY 12/24/24, Dr. House- 2/10/25    Last Refill: 10/1/24      Per LOV dated 11/8/24,    \"Continue 10 meq of K\"

## 2024-12-11 RX ORDER — POTASSIUM CHLORIDE 1500 MG/1
1 TABLET, EXTENDED RELEASE ORAL DAILY
Qty: 30 TABLET | Refills: 0 | OUTPATIENT
Start: 2024-12-11

## 2024-12-11 RX ORDER — POTASSIUM CHLORIDE 750 MG/1
10 TABLET, EXTENDED RELEASE ORAL DAILY
Qty: 30 TABLET | Refills: 0 | Status: SHIPPED | OUTPATIENT
Start: 2024-12-11

## 2024-12-11 NOTE — TELEPHONE ENCOUNTER
Refilled 10 meq, please remind patient to complete labs prior to 12/24 appt with Karlie. Thank you!

## 2024-12-16 ENCOUNTER — PATIENT MESSAGE (OUTPATIENT)
Facility: CLINIC | Age: 54
End: 2024-12-16

## 2024-12-22 DIAGNOSIS — I10 PRIMARY HYPERTENSION: ICD-10-CM

## 2024-12-22 DIAGNOSIS — E11.22 TYPE 2 DIABETES MELLITUS WITH STAGE 4 CHRONIC KIDNEY DISEASE, WITH LONG-TERM CURRENT USE OF INSULIN (HCC): ICD-10-CM

## 2024-12-22 DIAGNOSIS — Z79.4 TYPE 2 DIABETES MELLITUS WITH STAGE 4 CHRONIC KIDNEY DISEASE, WITH LONG-TERM CURRENT USE OF INSULIN (HCC): ICD-10-CM

## 2024-12-22 DIAGNOSIS — N18.4 TYPE 2 DIABETES MELLITUS WITH STAGE 4 CHRONIC KIDNEY DISEASE, WITH LONG-TERM CURRENT USE OF INSULIN (HCC): ICD-10-CM

## 2024-12-22 DIAGNOSIS — E78.00 HYPERCHOLESTEREMIA: ICD-10-CM

## 2024-12-23 DIAGNOSIS — E03.9 HYPOTHYROIDISM, UNSPECIFIED TYPE: ICD-10-CM

## 2024-12-23 RX ORDER — ATORVASTATIN CALCIUM 40 MG/1
40 TABLET, FILM COATED ORAL NIGHTLY
Qty: 90 TABLET | Refills: 1 | Status: SHIPPED | OUTPATIENT
Start: 2024-12-23 | End: 2025-03-17

## 2024-12-23 RX ORDER — LOSARTAN POTASSIUM 25 MG/1
25 TABLET ORAL DAILY
Qty: 90 TABLET | Refills: 1 | Status: SHIPPED | OUTPATIENT
Start: 2024-12-23

## 2024-12-23 NOTE — TELEPHONE ENCOUNTER
Levonorgestrel 20 MCG/DAY Intrauterine IUD         Sig: by Intrauterine route one time for 1 dose.    Disp: Not specified    Refills: 0    Start: 12/22/2024 - 12/22/2024    Class: Normal    Non-formulary    Last ordered: 9 months ago (3/20/2024) by Reported External/Patient        atorvastatin 40 MG Oral Tab         Sig: Take 1 tablet (40 mg total) by mouth nightly.    Disp: 90 tablet    Refills: 1    Start: 12/22/2024    Class: Normal    Non-formulary For: Hypercholesteremia; Type 2 diabetes mellitus with stage 4 chronic kidney disease, with long-term current use of insulin (HCC)    Last ordered: 8 months ago (4/11/2024) by Albina Dolan MD    Cholesterol Medication Protocol Nrofar4412/22/2024 11:10 AM   Protocol Details ALT < 80    ALT resulted within past year    Lipid panel within past 12 months    In person appointment or virtual visit in the past 12 mos or appointment in next 3 mos       losartan 25 MG Oral Tab         Sig: Take 1 tablet (25 mg total) by mouth daily.    Disp: 90 tablet    Refills: 0    Start: 12/22/2024    Class: Normal    Non-formulary For: Primary hypertension    Last ordered: 2 months ago (10/2/2024) by Albina Dolan MD    Hypertension Medications Protocol Qfgibc6612/22/2024 11:10 AM   Protocol Details EGFRCR or GFRNAA > 50    CMP or BMP in past 12 months    Last BP reading less than 140/90    In person appointment or virtual visit in the past 12 mos or appointment in next 3 mos      To be filled at: [address] [phone], [phone]     LOV:04/11/2024  RTC:2 months   Labs:02/19/2024, 11/02/2024  Last filled:04/11/2024, 10/06/2024  Future Appointments   Date Time Provider Department Center   12/30/2024 10:00 AM Maria Elena Craft APN [facility]   2/10/2025 10:00 AM Chayo House DO [id number] [facility]   3/5/2025 10:30 AM Jacque Allan MD [facility]

## 2024-12-23 NOTE — TELEPHONE ENCOUNTER
Is she requesting IUD replacement?  That would be done by gynecology (usually done every 5 years).  She can follow up with them for updated pap smear as well.    Silvino Jha, others (can ask for first available provider)  [address] [facility]  [facility] [address]  [place], [place] [address] [phone]    [address] [facility]  [place] [address] [phone]    Roe Ruffin, others  [address] [facility] [address]  [place], [place] [address] [phone]    [address] [address] [phone]

## 2024-12-24 NOTE — TELEPHONE ENCOUNTER
Endocrine refill protocol for medications for hypothyroidism and hyperthyroidism    Protocol Criteria:  FAILED Reason: N/A    If all below requirements are met, send a 90-day supply with 1 refill per provider protocol.    Verify appointment with Endocrinology completed in the last 12 months or scheduled in the next 6 months.    Normal TSH result in the past 12 months   Review recent telephone encounters and mychart communications with patient to ensure a dose change has not occurred since last office visit that was not updated in the medication history list     Last completed office visit:11/8/2024 Chayo House DO   Next scheduled Follow up:   Future Appointments   Date Time Provider Department Center   12/30/2024 10:00 AM Maria Elena Craft APN [facility]   2/10/2025 10:00 AM Chayo House DO [id number] [facility]   3/5/2025 10:30 AM Jacque Allan MD [facility]      Last TSH result:   TSH   Date Value Ref Range Status   11/02/2024 11.923 (H) 0.550 - 4.780 uIU/mL Final

## 2024-12-26 NOTE — TELEPHONE ENCOUNTER
Please have her complete TSH, FT4 labs before next visit (12/30 with Karlie) and Karlie can refill her LT4 accordingly

## 2024-12-27 RX ORDER — LEVOTHYROXINE SODIUM 75 UG/1
TABLET ORAL
Qty: 90 TABLET | Refills: 1 | OUTPATIENT
Start: 2024-12-27

## 2024-12-29 DIAGNOSIS — I10 PRIMARY HYPERTENSION: ICD-10-CM

## 2024-12-29 RX ORDER — LOSARTAN POTASSIUM 25 MG/1
25 TABLET ORAL DAILY
Qty: 90 TABLET | Refills: 1 | Status: CANCELLED | OUTPATIENT
Start: 2024-12-29

## 2024-12-30 ENCOUNTER — OFFICE VISIT (OUTPATIENT)
Facility: CLINIC | Age: 54
End: 2024-12-30
Payer: MEDICARE

## 2024-12-30 ENCOUNTER — LAB ENCOUNTER (OUTPATIENT)
Dept: LAB | Age: 54
End: 2024-12-30
Payer: MEDICARE

## 2024-12-30 VITALS — HEART RATE: 88 BPM | DIASTOLIC BLOOD PRESSURE: 76 MMHG | SYSTOLIC BLOOD PRESSURE: 130 MMHG | OXYGEN SATURATION: 98 %

## 2024-12-30 DIAGNOSIS — E87.6 HYPOKALEMIA: ICD-10-CM

## 2024-12-30 DIAGNOSIS — E11.40 TYPE 2 DIABETES MELLITUS WITH DIABETIC NEUROPATHY, WITH LONG-TERM CURRENT USE OF INSULIN (HCC): Primary | ICD-10-CM

## 2024-12-30 DIAGNOSIS — E11.59 HYPERTENSION ASSOCIATED WITH TYPE 2 DIABETES MELLITUS (HCC): ICD-10-CM

## 2024-12-30 DIAGNOSIS — E78.5 HYPERLIPIDEMIA ASSOCIATED WITH TYPE 2 DIABETES MELLITUS (HCC): ICD-10-CM

## 2024-12-30 DIAGNOSIS — N18.4 CKD (CHRONIC KIDNEY DISEASE) STAGE 4, GFR 15-29 ML/MIN (HCC): ICD-10-CM

## 2024-12-30 DIAGNOSIS — E11.69 HYPERLIPIDEMIA ASSOCIATED WITH TYPE 2 DIABETES MELLITUS (HCC): ICD-10-CM

## 2024-12-30 DIAGNOSIS — Z79.4 TYPE 2 DIABETES MELLITUS WITH DIABETIC NEUROPATHY, WITH LONG-TERM CURRENT USE OF INSULIN (HCC): Primary | ICD-10-CM

## 2024-12-30 DIAGNOSIS — R79.89 LOW SERUM CALCIUM: ICD-10-CM

## 2024-12-30 DIAGNOSIS — E03.9 HYPOTHYROIDISM, UNSPECIFIED TYPE: ICD-10-CM

## 2024-12-30 DIAGNOSIS — I15.2 HYPERTENSION ASSOCIATED WITH TYPE 2 DIABETES MELLITUS (HCC): ICD-10-CM

## 2024-12-30 DIAGNOSIS — E78.5 HYPERLIPIDEMIA, UNSPECIFIED HYPERLIPIDEMIA TYPE: ICD-10-CM

## 2024-12-30 DIAGNOSIS — R53.82 CHRONIC FATIGUE: ICD-10-CM

## 2024-12-30 LAB
ANION GAP SERPL CALC-SCNC: 7 MMOL/L (ref 0–18)
BUN BLD-MCNC: 48 MG/DL (ref 9–23)
CALCIUM BLD-MCNC: 8.8 MG/DL (ref 8.7–10.4)
CHLORIDE SERPL-SCNC: 111 MMOL/L (ref 98–112)
CHOLEST SERPL-MCNC: 84 MG/DL (ref ?–200)
CO2 SERPL-SCNC: 17 MMOL/L (ref 21–32)
CREAT BLD-MCNC: 3.56 MG/DL
EGFRCR SERPLBLD CKD-EPI 2021: 15 ML/MIN/1.73M2 (ref 60–?)
FASTING PATIENT LIPID ANSWER: YES
FASTING STATUS PATIENT QL REPORTED: YES
GLUCOSE BLD-MCNC: 112 MG/DL (ref 70–99)
HDLC SERPL-MCNC: 29 MG/DL (ref 40–59)
HEMOGLOBIN A1C: 5.9 % (ref 4.3–5.6)
LDLC SERPL CALC-MCNC: 40 MG/DL (ref ?–100)
NONHDLC SERPL-MCNC: 55 MG/DL (ref ?–130)
OSMOLALITY SERPL CALC.SUM OF ELEC: 293 MOSM/KG (ref 275–295)
POTASSIUM SERPL-SCNC: 4.6 MMOL/L (ref 3.5–5.1)
SODIUM SERPL-SCNC: 135 MMOL/L (ref 136–145)
T4 FREE SERPL-MCNC: 1.1 NG/DL (ref 0.8–1.7)
TRIGL SERPL-MCNC: 64 MG/DL (ref 30–149)
TSI SER-ACNC: 6.13 UIU/ML (ref 0.55–4.78)
VIT D+METAB SERPL-MCNC: 16.8 NG/ML (ref 30–100)
VLDLC SERPL CALC-MCNC: 9 MG/DL (ref 0–30)

## 2024-12-30 PROCEDURE — 82330 ASSAY OF CALCIUM: CPT

## 2024-12-30 PROCEDURE — 84443 ASSAY THYROID STIM HORMONE: CPT

## 2024-12-30 PROCEDURE — 36415 COLL VENOUS BLD VENIPUNCTURE: CPT

## 2024-12-30 PROCEDURE — 82306 VITAMIN D 25 HYDROXY: CPT

## 2024-12-30 PROCEDURE — 80061 LIPID PANEL: CPT

## 2024-12-30 PROCEDURE — 80048 BASIC METABOLIC PNL TOTAL CA: CPT

## 2024-12-30 PROCEDURE — 84439 ASSAY OF FREE THYROXINE: CPT

## 2024-12-30 RX ORDER — HUMAN INSULIN 100 [IU]/ML
36 INJECTION, SUSPENSION SUBCUTANEOUS
COMMUNITY
Start: 2024-12-30

## 2024-12-30 NOTE — PATIENT INSTRUCTIONS
Return Visit: Follow up with Dr. House in Feb 2025    - I will get back to you once labs are back     Med changes:  - Reduce insulin 70/30 to 36 units in AM,  36u w/ dinner     Neurologists:  - Dr. Romy Puentes-  [phone] - [place], can ask if colleagues are taking patients  - Dr. Jasmyne Luo ([place]) - [phone]    Podiatrists:  - Dr Roderick Dobbs- [phone]  - Dr Harshad Adams (same phone number as above)     Diabetic Medications               semaglutide 2 MG/3ML Subcutaneous Solution Pen-injector (Taking) Inject 0.25 mg into the skin once a week. NOT TAKING YET, waiting on 2025 patient assistance program (as of 12/30/2024)    insulin NPH & regular 70/30 (NOVOLIN 70/30 RELION) (70-30) 100 Units/mL Subcutaneous Suspension (Taking) 36 Units 2 (two) times daily before meals.          Lab Results   Component Value Date    A1C 5.9 (A) 12/30/2024    A1C 6.6 (H) 09/05/2024    A1C 7.3 (H) 05/20/2024    A1C 8.4 (A) 11/22/2023    A1C >14.0 06/15/2019        General follow up information:  Please let us know if you require any refills at least 1 week prior to your medication running out. If you do run out of medication, please call our office ASAP to request refills (do not wait until your follow up).   Please call our office if sugars at home are consistently greater than 250 or less than 70 for medication adjustment (do not wait until your follow up appointment).  Lab results and imaging will typically be reviewed at follow up appointments, or within 3-5 business days of ALL results being in if you do not have an appointment scheduled in the near future. Our office will contact you for any abnormal results requiring more urgent follow up or action.   The on-call pager is for urgent matters only. If you are a type 1 diabetic and run out of insulin after business hours 8AM-4PM, you may call the on-call pager for a refill to a 24 hour pharmacy.  If you have adrenal insufficiency and run out of steroids,  you may call the on-call pager for a refill to a 24 hour pharmacy. All other refill requests should be requested during business hours.    Office phone number: [phone]; phones are open Monday-Friday 8:30-4:30.

## 2024-12-30 NOTE — PROGRESS NOTES
[facility] Note    Name: Ceci Carroll  Date: 12/30/2024    Chief Complaint   Patient presents with    Follow - Up     Pt presents for DM follow up. Repeat A1C completed in clinic.           Subjective:   Initial HPI 11/2023- with Dr. House  Diabetes was diagnosed at age 27.  Initially started on Metformin. She did endorse noncompliance at that time. She was started on insulin around her early 30s.   Has tried lantus, novolog. Currently on Novolin 70/30 50u BID  In 2015, she had a bad infection and stopped all her meds at that time; was hospitalized. Restarted therapy after discharge.  In 2019, A1c of 15% and noted slower gait and weakness. Diagnosed with groin infection and gangrenous GB. States she was very sick and after that she became very serious about her diabetes management.   She noted worsening neuropathy after 2019. Her A1c was 6.1% in 2022 while on Novolin. Made significant lifestyle changes and was compliant with medications.  Today, A1c POC is 8.4%  Uses wheelchair and roller to get around; feels she is getting stronger in terms of her right drop foot. Does not drive  Family hx: mother with DM, brother with DM (passed away), sister with DM  Polyuria/polydipsia:  No Going every 4-5 hours (which is better than previously)  Blurred vision:  Yes:       Diabetes Treatment history   Duration of therapy with insulin: since age 32   Metformin: not compliant  Lantus/Humalog: too expensive     Current Regimen for diabetes:   Oral/Injectable medications: denies   Basal:  Novolin 50u BID . Injects in abdomen  Lantus/novolog was more expensive   Prandial:  denies  Missed doses: endorses compliance     Interim hx:  12/30/2024-darcy/ Karlie PERRY.Last A1c value was 5.9% done 12/30/2024.  Here with her  Valentino and son Valentino  Last office visit, reduced 70/30 dose due to lows. Continues to have hypo episodes  Ozempic patient assistance program pending for 2025, so hasn't started it  Had a  malfunctioning Claudia   Asking for neurologist referral for foot drop, still in wheelchair, cannot tolerate activity due to weakness    DM meds at visit:    - insulin 70/30 40 units in AM,  40-45u w/ dinner   - Ozempic 0.25mg weekly via patient assistance program (titrate insulin PRN)  - Freestyle Claudia 3 CGM      Thyroid: taking levothyroxine 75mcg 6d/week, 100mcg on Sundays. No missed doses. Getting labs after this appointment.     Continuous Glucose Monitoring Interpretation  Ceci KINSEY Glen has undergone continuous glucose monitoring with their CGM.  The blood glucose tracings were evaluated for two weeks prior to office visit.   Blood glucose tracings demonstrated areas of hyperglycemia post-meal, particularly post-dinner- these are rare  There were  several hypoglycemia episodes- no distinct pattern .          History/Other:    Allergies, PMH, SocHx and FHx reviewed and updated as appropriate in Epic on    semaglutide 2 MG/3ML Subcutaneous Solution Pen-injector Inject 0.25 mg into the skin once a week. NOT TAKING YET, waiting on 2025 patient assistance program (as of 12/30/2024)      insulin NPH & regular 70/30 (NOVOLIN 70/30 RELION) (70-30) 100 Units/mL Subcutaneous Suspension 36 Units 2 (two) times daily before meals.      atorvastatin 40 MG Oral Tab Take 1 tablet (40 mg total) by mouth nightly. 90 tablet 1    losartan 25 MG Oral Tab Take 1 tablet (25 mg total) by mouth daily. 90 tablet 1    Potassium Chloride ER 10 MEQ Oral Tab CR Take 1 tablet (10 mEq total) by mouth daily. 30 tablet 0    levothyroxine 75 MCG Oral Tab Take 1 tablet (50 mcg) Monday-Saturday and 2 tablets (100 mcg) on Sunday in the mornings on an empty stomach. 90 tablet 1    Levonorgestrel 20 MCG/DAY Intrauterine IUD 1 Intra Uterine Device by Intrauterine route one time.      Continuous Blood Gluc Sensor (FREESTYLE CLAUDIA 3 SENSOR) Does not apply Misc 1 each every 14 (fourteen) days. 2 each 2    Continuous Blood Gluc  (FREESTYLE  TERRY 3 READER) Does not apply Misc 1 each As Directed. Use with Terry 3 sensors to monitor blood glucose as directed. 1 each 0     No Known Allergies  Current Outpatient Medications   Medication Sig Dispense Refill    semaglutide 2 MG/3ML Subcutaneous Solution Pen-injector Inject 0.25 mg into the skin once a week. NOT TAKING YET, waiting on 2025 patient assistance program (as of 12/30/2024)      insulin NPH & regular 70/30 (NOVOLIN 70/30 RELION) (70-30) 100 Units/mL Subcutaneous Suspension 36 Units 2 (two) times daily before meals.      atorvastatin 40 MG Oral Tab Take 1 tablet (40 mg total) by mouth nightly. 90 tablet 1    losartan 25 MG Oral Tab Take 1 tablet (25 mg total) by mouth daily. 90 tablet 1    Potassium Chloride ER 10 MEQ Oral Tab CR Take 1 tablet (10 mEq total) by mouth daily. 30 tablet 0    levothyroxine 75 MCG Oral Tab Take 1 tablet (50 mcg) Monday-Saturday and 2 tablets (100 mcg) on Sunday in the mornings on an empty stomach. 90 tablet 1    Levonorgestrel 20 MCG/DAY Intrauterine IUD 1 Intra Uterine Device by Intrauterine route one time.      Continuous Blood Gluc Sensor (FREESTYLE TERRY 3 SENSOR) Does not apply Misc 1 each every 14 (fourteen) days. 2 each 2    Continuous Blood Gluc  (FREESTYLE TERRY 3 READER) Does not apply Misc 1 each As Directed. Use with Terry 3 sensors to monitor blood glucose as directed. 1 each 0     Past Medical History:    Diabetes (HCC)    Eye disorder    Hypertension    Non-healing non-surgical wound     Family History   Problem Relation Age of Onset    Diabetes Mother     Heart Disorder Mother     Diabetes Sister     Stroke Sister     Diabetes Brother      Social history: Reviewed.      ROS/Exam    REVIEW OF SYSTEMS: Ten point review of systems has been performed and is otherwise negative and/or non-contributory, except as described above.     VITALS  Vitals:    12/30/24 0954   BP: 130/76   Pulse: 88   SpO2: 98%       Wt Readings from Last 6 Encounters:   11/08/24  208 lb (94.3 kg)   09/05/24 220 lb (99.8 kg)   05/20/24 220 lb (99.8 kg)   11/22/23 185 lb (83.9 kg)   06/15/19 205 lb 6.4 oz (93.2 kg)   05/18/19 200 lb (90.7 kg)     PHYSICAL EXAM  Vitals:    12/30/24 0954   BP: 130/76   Pulse: 88       General Appearance:  alert, well developed, in no acute distress. In wheelchair  Eyes:  normal conjunctivae, sclera   Respiratory:  breathing comfortably  Musculoskeletal:  normal muscle strength and tone  Skin:   small draining wound on abdomen in umbilical region (5o'clock), mild erythema   Psychiatric:  oriented to time, self, and place   Diabetes foot exam performed: Bilateral foot exam w/ +dry skin. Monofilament/sensation of bilateral feet is normal. Vibration to dorsum to the first toe perceived bilaterally. Bilateral dorsalis pedis +1. Capillary refill <3 seconds. Thickened nails on bilateral feet, nails are long.  Foot care practices reviewed with patient.    Labs/Imaging: Pertinent imaging reviewed.    Overall glucose control:  Lab Results   Component Value Date    A1C 5.9 (A) 12/30/2024    A1C 6.6 (H) 09/05/2024    A1C 7.3 (H) 05/20/2024    A1C 8.4 (A) 11/22/2023    A1C >14.0 06/15/2019       Supplementary Documentation:   -Surveillance for Diabetes Complications & Risks  Foot exam/neuropathy: No data recorded  Retinopathy screening: Last Dilated Eye Exam: 09/25/24  Eye Exam shows Diabetic Retinopathy?: No      Assessment & Plan:     ICD-10-CM    1. Type 2 diabetes mellitus with diabetic neuropathy, with long-term current use of insulin ([facility])  E11.40 POC Hemoglobin A1C    Z79.4 GLUC MNTR CONT REC FROM NTRSTL TISS FLU PHYS I&R      2. Hypothyroidism, unspecified type  E03.9       3. CKD (chronic kidney disease) stage 4, GFR 15-29 ml/min ([facility])  N18.4 Vitamin D [E]      4. Hypertension associated with type 2 diabetes mellitus ([facility])  E11.59     I15.2       5. Hypokalemia  E87.6       6. Hyperlipidemia associated with type 2 diabetes mellitus ([facility])  E11.69     E78.5       7.  Chronic fatigue  R53.82         Ceci Carroll is a pleasant 54 year old female here for evaluation of:    #Type 2 Diabetes- PMHx of Type 2 diabetes mellitus diagnosed age 27. 2/2024 cpeptide 3.9.  Managed on insulin only regimen. Awaiting [employer] patient assistance program approval. For now, reducing insulin given hypoglycemia.    Last A1c value was 5.9% done 12/30/2024. Goal <7%. Importance of better glucose control in preventing onset/progression of end-organ damage discussed, as well as goals of therapy and clinical significance of A1C.     - med changes:  - Decrease insulin 70/30 40 units in AM / 40u --> 36u twice daily   - once [employer]ECKey patient assistance program approved, start Ozempic 0.25mg weekly (titrate insulin PRN)  - continue Freestyle Claudia 3 CGM    - would avoid SGLT2i given GFR <20, new start not recommended  - avoid metformin, avoid DPP4i other than linagliptin given GFR <20  - continue to check BG 3-4x/day using glucometer or CGM  -See above header \"Supplementary Documentation\" for surveillance for diabetes complications & risks    #CKD stage 4  - follows w/ Dr. Allan nephrology  - BG control as above   - Taking ARB  - has appointment with [facility] team in Feb 2025  Lab Results   Component Value Date    EGFRCR 12 (L) 11/02/2024    MICROALBCREA 1,445.3 (H) 11/02/2024      #Hypertension  -stable continue follow up with PCP/nephro  BP Readings from Last 1 Encounters:   12/30/24 130/76   BP Meds: losartan Tabs - 25 MG     #Hyperlipidemia  - LDL is not to goal <70  - consider repeating and if LDL remains above goal then can increase to 80 mg- due to complete today  Lab Results   Component Value Date     (H) 02/19/2024    TRIG 100 02/19/2024   Cholesterol Meds: atorvastatin Tabs - 40 MG      #Hypothyroidism  2/2024 TSH 11.10, fT4 0.80, restarted LT4 50mcg daily.   5/2024 TSH 5.22  9/2024 TSH 9, still taking 50mcg daily, forgot to incr dose  11/2024 TSH 11, currently taking 50 mcg M-Sa +  100 mcg Manz[place]res for the last 3 months; dose was incr'd     - taking levothyroxine 75mcg 6d/week, 100mcg on Sundays. No missed doses.   - plan pending results, Getting labs after this appointment.     #Hypokalemia  Continue 10 meq of K  Will discuss next steps once labs result    Due for repeat BMP, reminded to complete and follow up with PCP or nephrology re: next steps    #Diabetic neuropathy  Gave information for neurologist re: neuropathy/foot drop, and podiatry for nail care. Uses wheelchair. Foot exam complete today, sensation intact. Reviewed foot care practices    #Chronic fatigue  Reviewed that fatigue is often multifactorial and we have reviewed the common etiologies, both endocrine and non-endocrine.  - thyroid management plan as noted above  - no anemia per CBC   - vit D- repeat, will order replacement if deficient  - defer vit B12 for now  - reviewed if all above normal, pt will continue to follow up with PCP        Return in about 6 weeks (around 2/10/2025) for scheduled follow up with Dr. House.    A total of 48 minutes was spent today on obtaining history, reviewing pertinent labs, evaluating patient, providing multiple treatment options, reinforcing diet/exercise and compliance, and completing documentation.     ORLANDO Moody  [facility]  12/30/2024     In reviewing this note, please be advised that Dragon Voice Recognition software used to dictate the note may have made errors in recognizing some of the words or phrases.     Note to patient: The 21 Century Cures Act makes medical notes like these available to patients in the interest of transparency. However, be advised this is a medical document. It is intended as peer to peer communication. It is written in medical language and may contain abbreviations or verbiage that are unfamiliar. It may appear blunt or direct. Medical documents are intended to carry relevant information, facts as evident, and the clinical opinion of the  practitioner.

## 2024-12-31 LAB — LC CALCIUM, IONIZED: 4.5 MG/DL

## 2025-01-11 ENCOUNTER — PATIENT MESSAGE (OUTPATIENT)
Facility: CLINIC | Age: 55
End: 2025-01-11

## 2025-01-11 DIAGNOSIS — R79.89 LOW VITAMIN D LEVEL: ICD-10-CM

## 2025-01-14 ENCOUNTER — PATIENT MESSAGE (OUTPATIENT)
Facility: CLINIC | Age: 55
End: 2025-01-14

## 2025-01-14 RX ORDER — ERGOCALCIFEROL 1.25 MG/1
CAPSULE, LIQUID FILLED ORAL
Qty: 12 CAPSULE | Refills: 0 | OUTPATIENT
Start: 2025-01-14

## 2025-01-15 NOTE — TELEPHONE ENCOUNTER
Patient phoned office looking for update on [employer] PAP application.    Phoned Andree at: [phone]    States patient's application was \"cancelled\", they had received 2025 updated application, but there was a mismatch on income and household size.    States they need:  -proof of income  -household size  -Page 8 signature and date by Provider

## 2025-01-15 NOTE — TELEPHONE ENCOUNTER
Spoke with patient on telephone. She states that she had 3 people in her household. States she just got letter with proof of income- she can  bring this to office.    Patient states that application was previously dropped off by  and submitted that way.    Asked patient is she would be willing to bring new application and start fresh, states she will do. She will bring new application, and proof income to be submitted again.    To be noted on application- 3 people in household.    Will await forms.    Routing to pool for now.

## 2025-01-20 NOTE — TELEPHONE ENCOUNTER
25-Patient's  came into clinic today.  Verified patient's name and .  He dropped off [employer] PAP paperwork.  Placed in PA in basket for RN review.

## 2025-01-21 ENCOUNTER — TELEPHONE (OUTPATIENT)
Facility: CLINIC | Age: 55
End: 2025-01-21

## 2025-01-21 NOTE — TELEPHONE ENCOUNTER
1/21/25 patient called in to office    Patient wanted to verify that PA forms were completed correctly and that the process was started.    Please call her back at [phone]

## 2025-01-29 ENCOUNTER — PATIENT MESSAGE (OUTPATIENT)
Facility: CLINIC | Age: 55
End: 2025-01-29

## 2025-01-30 NOTE — TELEPHONE ENCOUNTER
Phoned [employer] for update on patient medications ([phone]). Spoke with Ksenia MAYBERRY    Patient's application was officially approved on 1/22/25. She will be enrolled through 1/17/26    Medication started processing on 1/22, no tracking number yet.    States if we haven't heard a response in 14 business days, can follow up again for an update. Does note there are shipping delays.    Indicative Software message update sent to patient.

## 2025-02-03 NOTE — TELEPHONE ENCOUNTER
Mychart update sent to patient- asked to follow up with bairon [employer] in 10 days if she has not heard from our office.

## 2025-02-24 ENCOUNTER — TELEPHONE (OUTPATIENT)
Facility: CLINIC | Age: 55
End: 2025-02-24

## 2025-02-24 NOTE — TELEPHONE ENCOUNTER
Received fax from [employer] requesting last visit notes be faxed for continuation of CGM supply benefit. Printed and faxed notes to [phone]. Will await confirmation.

## 2025-03-05 ENCOUNTER — OFFICE VISIT (OUTPATIENT)
Dept: NEPHROLOGY | Facility: CLINIC | Age: 55
End: 2025-03-05
Payer: MEDICARE

## 2025-03-05 VITALS — BODY MASS INDEX: 43 KG/M2 | DIASTOLIC BLOOD PRESSURE: 80 MMHG | SYSTOLIC BLOOD PRESSURE: 142 MMHG | WEIGHT: 236.5 LBS

## 2025-03-05 DIAGNOSIS — E11.21 DIABETIC NEPHROPATHY ASSOCIATED WITH TYPE 2 DIABETES MELLITUS (HCC): ICD-10-CM

## 2025-03-05 DIAGNOSIS — N18.4 CKD (CHRONIC KIDNEY DISEASE) STAGE 4, GFR 15-29 ML/MIN (HCC): Primary | ICD-10-CM

## 2025-03-05 DIAGNOSIS — I10 PRIMARY HYPERTENSION: ICD-10-CM

## 2025-03-05 PROCEDURE — 99214 OFFICE O/P EST MOD 30 MIN: CPT | Performed by: INTERNAL MEDICINE

## 2025-03-05 NOTE — PROGRESS NOTES
Nephrology Progress Note      ASSESSMENT/PLAN:      1) CKD 4- SCr 0.7 2019 -> 1.1 2020 -> 1.6 2021 -> 3.5 mg/dl currently indicates a linear decline in GFR over the last 5 years accompanied by isolated subnephrotic range proteinuria is consistent with diabetic nephropathy given 20+ year h/o uncontrolled DM (A1C 14) prior to 2020.  She does not have other risk factors for kidney disease; primary glomerular disease such as FSGS or membranous nephropathy is less likely given her modest proteinuria.  IgA nephropathy, MPGN or a nephritic process is also unlikely given her bland urine sediment without microscopic hematuria.  Meds are benign without chronic analgesic or PPI use.  There is no history of obstructive uropathy or urinary retention.  She does not have HIV or hepatitis risk factors.  There are no signs of a systemic process such as vasculitis or autoimmune disease.  She does not have elevated serum globulins or hypercalcemia to suggest a plasma cell dyscrasia. PLAN- d/w pt /  at length. Has begun transplant eval at [facility]- does not appear to have a living donor. To maintain focus on DM mgmt / continue ARB. F/u in 6 months.    2) DM 2 since age 28- A1C double digits prior to 2020; now A1C approx 6; to start ozempic (awaiting shipment)     3) HTN    4) Severe diabetic nephropathy + retinopathy    5) Probable gastroparesis       HPI:   Ceci KINSEY PatiLew is a 54 year old female with   Chief Complaint   Patient presents with    Chronic Kidney Disease    Proteinuria     Albina Dolan MD    Very pleasant 54-year-old female presents for evaluation of chronic kidney disease.  Please see above for further details.    ROS:    Denies fever/chills  Denies wt loss/gain  Denies HA or visual changes  Denies CP or palpitations  Denies SOB/cough/hemoptysis  Denies abd or flank pain  Denies N/V/D  Denies change in urinary habits or gross hematuria  Denies LE edema  Denies skin rashes/myalgias/arthralgias    PMH:  Past  Medical History:    Diabetes (HCC)    Eye disorder    Hypertension    Non-healing non-surgical wound       PSH:  Past Surgical History:   Procedure Laterality Date    Back surgery      [place] lower back surgery          Cholecystectomy      Laparoscopic cholecystectomy         Medications (Active prior to today's visit):  Current Outpatient Medications   Medication Sig Dispense Refill    levothyroxine 100 MCG Oral Tab Take 1 tablet, 100 mcg, daily on an empty stomach 30 minutes before breakfast. 90 tablet 1    ergocalciferol 1.25 MG (03079 UT) Oral Cap Take 1 capsule, 64327LY once weekly for 12 weeks. 12 capsule 0    semaglutide 2 MG/3ML Subcutaneous Solution Pen-injector Inject 0.25 mg into the skin once a week. NOT TAKING YET, waiting on  patient assistance program (as of 2024)      insulin NPH & regular 70/30 (NOVOLIN 70/30 RELION) (70-30) 100 Units/mL Subcutaneous Suspension 36 Units 2 (two) times daily before meals.      atorvastatin 40 MG Oral Tab Take 1 tablet (40 mg total) by mouth nightly. 90 tablet 1    losartan 25 MG Oral Tab Take 1 tablet (25 mg total) by mouth daily. 90 tablet 1    Potassium Chloride ER 10 MEQ Oral Tab CR Take 1 tablet (10 mEq total) by mouth daily. 30 tablet 0    levothyroxine 75 MCG Oral Tab Take 1 tablet (50 mcg) Monday-Saturday and 2 tablets (100 mcg) on  in the mornings on an empty stomach. 90 tablet 1    Levonorgestrel 20 MCG/DAY Intrauterine IUD 1 Intra Uterine Device by Intrauterine route one time.      Continuous Blood Gluc Sensor (FREESTYLE TERRY 3 SENSOR) Does not apply Misc 1 each every 14 (fourteen) days. 2 each 2    Continuous Blood Gluc  (FREESTYLE TERRY 3 READER) Does not apply Misc 1 each As Directed. Use with Terry 3 sensors to monitor blood glucose as directed. 1 each 0       Allergies:  No Known Allergies    Social History:  Social History     Socioeconomic History    Marital status:    Tobacco Use    Smoking status: Never     Smokeless tobacco: Never   Vaping Use    Vaping status: Never Used   Substance and Sexual Activity    Alcohol use: Never    Drug use: Not Currently   Other Topics Concern    Caffeine Concern Yes     Comment: 1 can of Coke daily    Exercise No        Family History:  Denies family history of kidney disease.    PHYSICAL EXAM:   There were no vitals taken for this visit.   Wt Readings from Last 3 Encounters:   11/08/24 208 lb (94.3 kg)   09/05/24 220 lb (99.8 kg)   05/20/24 220 lb (99.8 kg)     General: Alert and oriented in no apparent distress.  HEENT: No scleral icterus, MMM  Neck: Supple, no LUIS or thyromegaly  Cardiac: Regular rate and rhythm, S1, S2 normal, no murmur or rub  Lungs: Clear without wheezes, rales, rhonchi.    Abdomen: Soft, non-tender. + bowel sounds, no palpable organomegaly  Extremities: Without clubbing, cyanosis or edema.  Neurologic:  normal affect, cranial nerves grossly intact, moving all extremities  Skin: Warm and dry, no rashes        Jacque Allan MD  3/5/2025  3:50 PM

## 2025-03-12 DIAGNOSIS — E11.40 TYPE 2 DIABETES MELLITUS WITH DIABETIC NEUROPATHY, WITH LONG-TERM CURRENT USE OF INSULIN (HCC): Primary | ICD-10-CM

## 2025-03-12 DIAGNOSIS — R79.89 LOW VITAMIN D LEVEL: ICD-10-CM

## 2025-03-12 DIAGNOSIS — Z79.4 TYPE 2 DIABETES MELLITUS WITH DIABETIC NEUROPATHY, WITH LONG-TERM CURRENT USE OF INSULIN (HCC): Primary | ICD-10-CM

## 2025-03-12 RX ORDER — SEMAGLUTIDE 0.68 MG/ML
0.25 INJECTION, SOLUTION SUBCUTANEOUS WEEKLY
Qty: 3 ML | Refills: 1 | Status: SHIPPED | OUTPATIENT
Start: 2025-03-12

## 2025-03-12 RX ORDER — PEN NEEDLE, DIABETIC 32GX 5/32"
NEEDLE, DISPOSABLE MISCELLANEOUS
Qty: 100 EACH | Refills: 1 | Status: SHIPPED | OUTPATIENT
Start: 2025-03-12

## 2025-03-12 NOTE — TELEPHONE ENCOUNTER
Received call from patient regarding vouchers from [employer].    Reviewed that patient will be starting Ozempic 0.25 mg, will need needles and Novolin    Routed for review.     [facility]

## 2025-03-13 DIAGNOSIS — E78.5 HYPERLIPIDEMIA, UNSPECIFIED HYPERLIPIDEMIA TYPE: ICD-10-CM

## 2025-03-13 DIAGNOSIS — Z79.4 TYPE 2 DIABETES MELLITUS WITH DIABETIC NEUROPATHY, WITH LONG-TERM CURRENT USE OF INSULIN (HCC): ICD-10-CM

## 2025-03-13 DIAGNOSIS — E11.40 TYPE 2 DIABETES MELLITUS WITH DIABETIC NEUROPATHY, WITH LONG-TERM CURRENT USE OF INSULIN (HCC): ICD-10-CM

## 2025-03-13 DIAGNOSIS — N18.4 CKD (CHRONIC KIDNEY DISEASE) STAGE 4, GFR 15-29 ML/MIN (HCC): ICD-10-CM

## 2025-03-17 DIAGNOSIS — E11.22 TYPE 2 DIABETES MELLITUS WITH STAGE 4 CHRONIC KIDNEY DISEASE, WITH LONG-TERM CURRENT USE OF INSULIN (HCC): ICD-10-CM

## 2025-03-17 DIAGNOSIS — Z79.4 TYPE 2 DIABETES MELLITUS WITH STAGE 4 CHRONIC KIDNEY DISEASE, WITH LONG-TERM CURRENT USE OF INSULIN (HCC): ICD-10-CM

## 2025-03-17 DIAGNOSIS — N18.4 TYPE 2 DIABETES MELLITUS WITH STAGE 4 CHRONIC KIDNEY DISEASE, WITH LONG-TERM CURRENT USE OF INSULIN (HCC): ICD-10-CM

## 2025-03-17 DIAGNOSIS — E78.00 HYPERCHOLESTEREMIA: ICD-10-CM

## 2025-03-17 RX ORDER — INSULIN ASPART 100 [IU]/ML
36 INJECTION, SUSPENSION SUBCUTANEOUS
Qty: 30 ML | Refills: 1 | OUTPATIENT
Start: 2025-03-17

## 2025-03-17 NOTE — TELEPHONE ENCOUNTER
Patient phoned office- states she went to  voucher for Ozempic/Novolin.     She was able to  Ozempic, but voucher given is for Novolog 70/30 not Novolin 70/30.    She confirms she's been using Novolin 70/30 not Novolog 70/30.    Patient states she has her Novolin prescription at home- has no problem getting that as she usually picks up from [facility] without a prescription.    Double checked Patient Assistance Program application- does states Novolog Mix 70/30.

## 2025-03-18 ENCOUNTER — TELEPHONE (OUTPATIENT)
Facility: CLINIC | Age: 55
End: 2025-03-18

## 2025-03-18 RX ORDER — ATORVASTATIN CALCIUM 40 MG/1
40 TABLET, FILM COATED ORAL NIGHTLY
Qty: 90 TABLET | Refills: 1 | Status: SHIPPED | OUTPATIENT
Start: 2025-03-18

## 2025-03-18 NOTE — TELEPHONE ENCOUNTER
Received [employer] PAP shipment for patient containing Novolog (6), Ozempic (2) and Novofine 32G Tip (3).     [employer] message sent to patient informing ready for pickup.

## 2025-03-18 NOTE — TELEPHONE ENCOUNTER
LOV: New Patient 4/11/2024 with Dr. Dolan  RTC: 2-3 months  Last Relevant Labs: 2/19/2025  Last Lipid 12/30/2024  Filled: 12/23/2024    #90 with 1 refill    Future Appointments   Date Time Provider Department Center   3/31/2025 10:55 AM Romy Puentes DO ENINAPER [facility]   4/1/2025 11:00 AM Albina Dolan MD [facility] 8 [facility] [place]   4/3/2025 12:30 PM Chayo House DO [id number] [facility]   9/16/2025 11:00 AM Jacque Allan MD [id number] [facility]

## 2025-03-19 RX ORDER — POTASSIUM CHLORIDE 750 MG/1
10 TABLET, EXTENDED RELEASE ORAL DAILY
Qty: 30 TABLET | Refills: 0 | OUTPATIENT
Start: 2025-03-19

## 2025-03-19 NOTE — TELEPHONE ENCOUNTER
Patients  presented to clinic to pickup Andree medication. Signed blue packing sheets for each medication. Blue sheets sent to scanning.

## 2025-03-21 ENCOUNTER — MED REC SCAN ONLY (OUTPATIENT)
Facility: CLINIC | Age: 55
End: 2025-03-21

## 2025-03-23 DIAGNOSIS — R79.89 LOW VITAMIN D LEVEL: ICD-10-CM

## 2025-03-25 RX ORDER — ERGOCALCIFEROL 1.25 MG/1
CAPSULE, LIQUID FILLED ORAL
Qty: 12 CAPSULE | Refills: 0 | OUTPATIENT
Start: 2025-03-25

## 2025-03-25 NOTE — TELEPHONE ENCOUNTER
Patient to repeat labs after 3 months of Vitamin D. Lab ordered.    Mychart update sent for review.    Refill denied as inappropriate

## 2025-03-31 ENCOUNTER — PROCEDURE VISIT (OUTPATIENT)
Dept: NEUROLOGY | Facility: CLINIC | Age: 55
End: 2025-03-31
Payer: MEDICARE

## 2025-03-31 ENCOUNTER — OFFICE VISIT (OUTPATIENT)
Dept: NEUROLOGY | Facility: CLINIC | Age: 55
End: 2025-03-31
Payer: MEDICARE

## 2025-03-31 VITALS
SYSTOLIC BLOOD PRESSURE: 130 MMHG | HEIGHT: 62 IN | RESPIRATION RATE: 16 BRPM | OXYGEN SATURATION: 99 % | WEIGHT: 225 LBS | DIASTOLIC BLOOD PRESSURE: 88 MMHG | BODY MASS INDEX: 41.41 KG/M2 | HEART RATE: 85 BPM

## 2025-03-31 DIAGNOSIS — M21.371 FOOT DROP, RIGHT FOOT: Primary | ICD-10-CM

## 2025-03-31 DIAGNOSIS — F32.1 CURRENT MODERATE EPISODE OF MAJOR DEPRESSIVE DISORDER WITHOUT PRIOR EPISODE (HCC): ICD-10-CM

## 2025-03-31 DIAGNOSIS — R29.898 PROXIMAL LEG WEAKNESS: ICD-10-CM

## 2025-03-31 DIAGNOSIS — E11.40 CHRONIC PAINFUL DIABETIC NEUROPATHY (HCC): Primary | ICD-10-CM

## 2025-03-31 DIAGNOSIS — M21.379 FOOT-DROP, UNSPECIFIED LATERALITY: ICD-10-CM

## 2025-03-31 DIAGNOSIS — R27.8 SENSORY ATAXIA: ICD-10-CM

## 2025-03-31 DIAGNOSIS — R29.898 WEAKNESS OF BOTH HANDS: ICD-10-CM

## 2025-03-31 DIAGNOSIS — M21.371 FOOT DROP, RIGHT FOOT: ICD-10-CM

## 2025-03-31 RX ORDER — AMLODIPINE BESYLATE 10 MG/1
10 TABLET ORAL DAILY
COMMUNITY
Start: 2025-03-03

## 2025-03-31 NOTE — PATIENT INSTRUCTIONS
Refill policies:    Allow 2-3 business days for refills; controlled substances may take longer.  Contact your pharmacy at least 5 days prior to running out of medication and have them send an electronic request or submit request through the “request refill” option in your [employer] account.  Refills are not addressed on weekends; covering physicians do not authorize routine medications on weekends.  No narcotics or controlled substances are refilled after noon on Fridays or by on call physicians.  By law, narcotics must be electronically prescribed.  A 30 day supply with no refills is the maximum allowed.  If your prescription is due for a refill, you may be due for a follow up appointment.  To best provide you care, patients receiving routine medications need to be seen at least once a year.  Patients receiving narcotic/controlled substance medications need to be seen at least once every 3 months.  In the event that your preferred pharmacy does not have the requested medication in stock (e.g. Backordered), it is your responsibility to find another pharmacy that has the requested medication available.  We will gladly send a new prescription to that pharmacy at your request.    Scheduling Tests:    If your physician has ordered radiology tests such as MRI or CT scans, please contact Central Scheduling at [phone] right away to schedule the test.  Once scheduled, the [facility] Centralized Referral Team will work with your insurance carrier to obtain pre-certification or prior authorization.  Depending on your insurance carrier, approval may take 3-10 days.  It is highly recommended patients assure they have received an authorization before having a test performed.  If test is done without insurance authorization, patient may be responsible for the entire amount billed.      Precertification and Prior Authorizations:  If your physician has recommended that you have a procedure or additional testing performed the [facility]  Centralized Referral Team will contact your insurance carrier to obtain pre-certification or prior authorization.    You are strongly encouraged to contact your insurance carrier to verify that your procedure/test has been approved and is a COVERED benefit.  Although the [facility] Centralized Referral Team does its due diligence, the insurance carrier gives the disclaimer that \"Although the procedure is authorized, this does not guarantee payment.\"    Ultimately the patient is responsible for payment.   Thank you for your understanding in this matter.  Paperwork Completion:  If you require FMLA or disability paperwork for your recovery, please make sure to either drop it off or have it faxed to our office at [phone]. Be sure the form has your name and date of birth on it.  The form will be faxed to our Forms Department and they will complete it for you.  There is a 25$ fee for all forms that need to be filled out.  Please be aware there is a 10-14 day turnaround time.  You will need to sign a release of information (JAMES) form if your paperwork does not come with one.  You may call the Forms Department with any questions at [phone].  Their fax number is [phone].

## 2025-03-31 NOTE — PROCEDURES
[address], [address]  [place] [address]      Full Name: Ceci Hackett Gender: Female  Patient ID: [id number] YOB: 1970      Visit Date: 3/31/2025 1:41 PM  Age: 54 Years  Examining Physician: Romy Puentes DO  Referring Physician: Romy Puentes DO  History: A few year history of right foot drop, bilateral foot numbness, and right followed by bilateral proximal leg weakness.       Sensory NCS      Nerve / Sites Rec. Site Onset Lat Peak Lat NP Amp PP Amp Segments Distance Velocity Comment     ms ms µV µV  cm m/s    R Hand - Sensory      Median Wrist D2 NR NR NR NR Median Wrist - D2 14 NR       Ulnar Wrist  D5 NR NR NR NR Ulnar Wrist  - D5 14 NR       Radial Forearm Wrist NR NR NR NR Radial Forearm - Wrist 10 NR    R Leg - Sensory      Sural Calf Ankle NR NR NR NR Sural Calf - Ankle 14 NR    L Leg - Sensory      Sural Calf Ankle NR NR NR NR Sural Calf - Ankle 14 NR        Motor NCS      Nerve / Sites Muscle Latency Amplitude Segments Dist. Lat Diff Velocity Comments     ms mV  cm ms m/s    R Median, Ulnar - (APB, ADM)      Median Wrist APB 4.96 8.1 Median Wrist - APB 8         Median Elbow APB 10.71 7.2 Median Elbow - Wrist 24.5 5.75 42.6       Ulnar Wrist ADM 4.40 3.1 Ulnar Wrist - ADM 8         Ulnar B.Elbow ADM 10.10 1.2 Ulnar B.Elbow - Wrist 23 5.71 40.3       Ulnar A.Elbow ADM 11.88 1.2 Ulnar A.Elbow - B.Elbow 5.5 1.77 31.1        Median 7 - Ulnar A.Elbow       R Peroneal, Tibial - (EDB, AH)      Peroneal Ankle EDB NR NR Peroneal Ankle - EDB 8         Tibial Ankle AH NR NR Tibial Ankle - AH 8      L Peroneal, Tibial - (EDB, AH)      Peroneal Ankle EDB NR NR Peroneal Ankle - EDB 8         Tibial Ankle AH NR NR Tibial Ankle - AH 8      R Peroneal - Tib Ant      Fib Head Tib Ant NR NR Fib Head - Tib Ant       L Peroneal - Tib Ant      Fib Head Tib Ant 4.58 1.0 Fib Head - Tib Ant          Pop fossa Tib Ant 6.08 1.2 Pop fossa - Fib Head 8  1.50 53.3        EMG Summary Table     Spontaneous MUAP Recruitment   Muscle IA Fib PSW Fasc H.F. Amp Dur. PPP Pattern   R. Deltoid N None None None None N N N N   R. Biceps brachii N None None None None N N N N   R. Triceps brachii N None None None None N N N N   R. Flexor carpi radialis N None None None None N N N N   R. First dorsal interosseous 1+ None None 1+ None N 1+ N Reduced   R. Vastus lateralis N None None None None N N N N   R. Gastrocnemius (Lateral head) N None None None None N N N N   R. Tibialis anterior 1+ None None 1+ None N 1+ N Discrete   R. Iliopsoas N None None None None N N N N   R. Tibialis posterior N None None None None N N N Reduced   L. Vastus lateralis N None None None None N 1+ N N   L. Gastrocnemius (Medial head) 1+ None None None None N 1+ N N   L. Tibialis anterior N 1+ 1+ None None 1+ 1+ N Reduced   L. Iliopsoas 1+ None None None None N 1+ N N   L. Lumbar paraspinals (mid) 1+ None None None 1+ N N N N   Right lumbar paraspinals N None None None None N N N N         Ceci Jose Alfredo Hackett [id number] 3/31/2025 1:41 PM     4 of 4    Summary:    The right median, ulnar, and radial sensory responses were absent.  The bilateral sural sensory responses were absent.   The right median motor response had mildly slowed velocity, amplitude was normal. Latency was prolonged.   The right ulnar motor response had decreased amplitude, and especially at the elbow. Velocity was slowed moderately so below the elbow, and significantly above the elbow.   The peroneal motor responses were absent at the EDB. When testing the TA, the right one was absent, and the left one had mildly decreased amplitude.  Needle EMG was performed on selected muscles of the bilateral lower extremities, lumbar paraspinal muscles, and right upper extremity. There were fasciculations seen in the R FDI, R TA, and positive sharp waves and fibs in the left TA. Increased insertional activity in the R FDI, R TA, L gastric, L  iliopsoas, left lumbar paraspinals. There was motor unit remodeling seein the R FDI, R TA, left quad, L gastric, L TA, L iliopsoas. CRD's were seen in the Left lumbar paraspinals.     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   Due to the severity of the polyneuropathy, the etiology of the foot drop cannot be determined. Correlate clinically for a right peroneal mononeuropathy, or more likely right L5 lumbar radiculopathy.  There is evidence actually of a left subacute on chronic L4/L5 lumbar radiculopathy.   There are chronic findings in the left quadriceps and iliopsoas that are borderline suggestive of a left L3 chronic radiculopathy. Correlated clinically.  There is evidence of a superimposed right compressive ulnar mononeuropathy at the elbow.   There is no evidence of a myopathy.      ________________________  Romy Puentes,   Neuromuscular and General Neurology  [facility]              Ceci Hackett [id number] 3/31/2025 1:41 PM     4 of 4

## 2025-03-31 NOTE — PROGRESS NOTES
[facility] [place]   Neurology    Ceci Carroll Patient Status:  No patient class for patient encounter    1970 MRN [id number]   Location [facility] PCP Albina Dolan MD              HPI:   Ceci Carroll is a(n) 54 year old female with history of CKD, DM with diagnosis of polyneuropathy, HTN, HL, glaucoma, who presents at the request of Dr. House for evaluation of foot drop. She had worsening of distal foot paresthesias in , felt a shock going from her thigh to her right foot. During that time she gradually developed a right foot drop. The shock feeling was only in the beginning. Winter of 2019 was hospitalized for a few different infections. In  she started rehab, couldn't get a right AFO. Developed gradual numbness in her feet around this time. Feels like it is intermittent. Has a history of left sided lumbar radiculopathy. In  was very deconditioned due to hospitalizations that lasted 7 weeks. Walking was very unsteady, and she felt scared to walk. Did not complete rehab at that time.Has decreased vision in the left eye. Had insurance issues, so did not do PT in the last few years.     Pertinent imaging and laboratory work-up:   A1c 13.7 and > 14 in , , 8.4 in , 7 or below in , 6.5 in 2025    Past Medical History:  Past Medical History:    Diabetes (HCC)    Eye disorder    Hypertension    Non-healing non-surgical wound        Past Surgical History:  Past Surgical History:   Procedure Laterality Date    Back surgery      [place] lower back surgery          Cholecystectomy      [facility]  3/31/2025    Laparoscopic cholecystectomy         Family History:  family history includes Diabetes in her brother, mother, and sister; Heart Disorder in her mother; Stroke in her sister.      Social History:   reports that she has never smoked. She has never used smokeless tobacco. She reports that she  does not currently use drugs. She reports that she does not drink alcohol.    Allergies:  Allergies[1]    MEDICATIONS:    Current Outpatient Medications:     amLODIPine 10 MG Oral Tab, Take 1 tablet (10 mg total) by mouth daily., Disp: , Rfl:     atorvastatin 40 MG Oral Tab, Take 1 tablet (40 mg total) by mouth nightly., Disp: 90 tablet, Rfl: 1    semaglutide (OZEMPIC, 0.25 OR 0.5 MG/DOSE,) 2 MG/3ML Subcutaneous Solution Pen-injector, Inject 0.25 mg into the skin once a week., Disp: 3 mL, Rfl: 1    insulin NPH & regular 70/30 (70-30) 100 Units/mL Subcutaneous Suspension, 36 units in the am and 36 units in the pm with meals., Disp: 30 mL, Rfl: 1    Insulin Pen Needle (BD PEN NEEDLE MIGUELANGEL U/F) 32G X 4 MM Does not apply Misc, Used to inject twice daily., Disp: 100 each, Rfl: 1    levothyroxine 100 MCG Oral Tab, Take 1 tablet, 100 mcg, daily on an empty stomach 30 minutes before breakfast., Disp: 90 tablet, Rfl: 1    ergocalciferol 1.25 MG (14612 UT) Oral Cap, Take 1 capsule, 36763II once weekly for 12 weeks., Disp: 12 capsule, Rfl: 0    semaglutide 2 MG/3ML Subcutaneous Solution Pen-injector, Inject 0.25 mg into the skin once a week. NOT TAKING YET, waiting on 2025 patient assistance program (as of 12/30/2024), Disp: , Rfl:     losartan 25 MG Oral Tab, Take 1 tablet (25 mg total) by mouth daily., Disp: 90 tablet, Rfl: 1    Potassium Chloride ER 10 MEQ Oral Tab CR, Take 1 tablet (10 mEq total) by mouth daily., Disp: 30 tablet, Rfl: 0    Levonorgestrel 20 MCG/DAY Intrauterine IUD, 1 Intra Uterine Device by Intrauterine route one time., Disp: , Rfl:     Continuous Blood Gluc Sensor (FREESTYLE TERRY 3 SENSOR) Does not apply Misc, 1 each every 14 (fourteen) days., Disp: 2 each, Rfl: 2    Continuous Blood Gluc  (FREESTYLE TERRY 3 READER) Does not apply Misc, 1 each As Directed. Use with Terry 3 sensors to monitor blood glucose as directed., Disp: 1 each, Rfl: 0    insulin NPH & regular 70/30 (NOVOLIN 70/30 RELION)  (70-30) 100 Units/mL Subcutaneous Suspension, 36 Units 2 (two) times daily before meals., Disp: , Rfl:       Review of Systems:   A comprehensive 10 point review of systems was completed.     Pertinent positives and negatives noted in the HPI.         PHYSICAL EXAM:   Neurological Exam  Vitals  Vitals:    03/31/25 1035   BP: 130/88   Pulse: 85   Resp: 16     General Appearance: Patient is a 54 year old female in no acute distress  Cardiac: Normal rate & regular rhythm  Skin: There are no rashes or other skin lesions.  Musculoskeletal: There is no scoliosis, or joint deformities  Neurologic examination:  Mental status: Patient is alert, attentive, and oriented x 3. Language is coherent and fluent without aphasia. Memory, comprehension and ability to follow commands were intact.   Cranial nerves II-XII: Optic discs were sharp. Pupils were round and reacted to light. Extraocular movements were full. There was no face, jaw, palate or tongue weakness or atrophy. Facial sensation was normal. Hearing was grossly intact. Shoulder shrug was normal.   Motor exam revealed normal muscle bulk and tone. No atrophy or fasciculations. Manual muscle testing revealed 4/5 in bilat DI, 5 in APB/FE and other UE muscles, LE: HF 4 bilat, KE/KF 5, R DF 1 L DF 5-, PF 4 bilat, EHL 2 bilat  Deep tendon reflexes were 2 at the biceps, brachioradialis, triceps, 0 knee jerk, and 0ankle jerk. Plantar responses were flexor bilaterally.   Sensory exam revealed normal light touch perception. Vibratory perception was at R ankle and present in L toe, and proprioception was absent in the L toe, needed very large excursion in R toe. Pinprick and temperature were hyperesthetic up to R below knee, and decreased up to below L knee. Romberg sign was absent.  Complex motor skills revealed normal coordination. Finger-nose-finger intact.   Gait was narrow and stable, was able to walk on heels, toes and tandem without any difficulty.     ASSESSMENT/ACTIVE  PROBLEM LIST:     Encounter Diagnoses   Name Primary?    Chronic painful diabetic neuropathy (HCC) Yes    Foot drop, right foot     Sensory ataxia     Proximal leg weakness     Weakness of both hands     Foot-drop, unspecified laterality     Current moderate episode of major depressive disorder without prior episode (HCC)        Discussion/Plan:  Bilateral proximal lower extremity weakness, initially right in 2020, several months after right foot drop, and in the last year, the left hip flexor as well  Differential includes multifactorial upper lumbar radiculopathy, diabetic radiculoplexus neuropathy, or deconditioning of proximal leg muscles superimposed on sensory ataxia from polyneuropathy, after prolonged hospitalizations in 2020, and lack of rehabilitation  Obtain EMG   Start PT  Obtain MRI lumbar spine to rule out bilateral L2/L3 foraminal stenosis    R foot drop suspect right L5 lumbar radiculopathy based on initial symptoms in summer of 2024. However, cannot rule out peroneal neuropathy superimposed on diabetic severe sensory motor polyneuropathy  Start PT and AFO  Obtain EMG both legs and RUE    Diabetic polyneuropathy, intermittently painful, with sensory ataxia  Start PT for sensory ataxia  EMG    Depression- was on a medication in 2019  Prefers not to be on a medication, reports this is better    Bilateral FDI weakness on exam  EMG- after visit, patient notified that EMG shows R ulnar neuropathy   Recommend:   Avoid bending elbows smaller than 90 degrees  Do not rest elbow on hard surfaces  elbow splint to be worn every night during sleep- instructions given      History of L radiculopathy  See discussion above    Requested Prescriptions      No prescriptions requested or ordered in this encounter          We discussed in depth regarding the diagnosis, prognosis, treatment. The patient was given ample opportunity to ask questions. All questions and concerns were addressed.     Lilian Puentes  DO  Neuromuscular and General Neurology  [facility]             [1] No Known Allergies

## 2025-03-31 NOTE — PATIENT INSTRUCTIONS
Refill policies:    Allow 2-3 business days for refills; controlled substances may take longer.  Contact your pharmacy at least 5 days prior to running out of medication and have them send an electronic request or submit request through the “request refill” option in your [facility] account.  Refills are not addressed on weekends; covering physicians do not authorize routine medications on weekends.  No narcotics or controlled substances are refilled after noon on Fridays or by on call physicians.  By law, narcotics must be electronically prescribed.  A 30 day supply with no refills is the maximum allowed.  If your prescription is due for a refill, you may be due for a follow up appointment.  To best provide you care, patients receiving routine medications need to be seen at least once a year.  Patients receiving narcotic/controlled substance medications need to be seen at least once every 3 months.  In the event that your preferred pharmacy does not have the requested medication in stock (e.g. Backordered), it is your responsibility to find another pharmacy that has the requested medication available.  We will gladly send a new prescription to that pharmacy at your request.    Scheduling Tests:    If your physician has ordered radiology tests such as MRI or CT scans, please contact Central Scheduling at [phone] right away to schedule the test.  Once scheduled, the [facility] Centralized Referral Team will work with your insurance carrier to obtain pre-certification or prior authorization.  Depending on your insurance carrier, approval may take 3-10 days.  It is highly recommended patients assure they have received an authorization before having a test performed.  If test is done without insurance authorization, patient may be responsible for the entire amount billed.      Precertification and Prior Authorizations:  If your physician has recommended that you have a procedure or additional testing performed the [facility]  Centralized Referral Team will contact your insurance carrier to obtain pre-certification or prior authorization.    You are strongly encouraged to contact your insurance carrier to verify that your procedure/test has been approved and is a COVERED benefit.  Although the [facility] Centralized Referral Team does its due diligence, the insurance carrier gives the disclaimer that \"Although the procedure is authorized, this does not guarantee payment.\"    Ultimately the patient is responsible for payment.   Thank you for your understanding in this matter.  Paperwork Completion:  If you require FMLA or disability paperwork for your recovery, please make sure to either drop it off or have it faxed to our office at [phone]. Be sure the form has your name and date of birth on it.  The form will be faxed to our Forms Department and they will complete it for you.  There is a 25$ fee for all forms that need to be filled out.  Please be aware there is a 10-14 day turnaround time.  You will need to sign a release of information (JAMES) form if your paperwork does not come with one.  You may call the Forms Department with any questions at [phone].  Their fax number is [phone].            Ulnar neuropathy: cubital tunnel syndrome    Recommend:   Avoid bending elbows smaller than 90 degrees  Do not rest elbow on hard surfaces  elbow splint to be worn every night during sleep:  Go to [employer]  Type in \"elbow splint for cubital tunnel\" - search/pick, not one of blue velcro ones  OR  TYPE:  Elbow Brace for Comfortable Elbow Support - Adjustable Stabilizer to Keep Your Elbow Immobilized & Your Arm Straight with Two Removable Metal    OR  \"Everyday Medical Elbow Brace for Arthritis and Cubital Tunnel Syndrome I Elbow Immobilizer Splint for Tennis Elbow I Stabilizer Support Splint with Removable Splint I Fits Both Arms I Unisex\"

## 2025-04-01 ENCOUNTER — TELEPHONE (OUTPATIENT)
Dept: INTERNAL MEDICINE CLINIC | Facility: CLINIC | Age: 55
End: 2025-04-01

## 2025-04-01 ENCOUNTER — OFFICE VISIT (OUTPATIENT)
Dept: INTERNAL MEDICINE CLINIC | Facility: CLINIC | Age: 55
End: 2025-04-01
Payer: MEDICARE

## 2025-04-01 ENCOUNTER — LAB ENCOUNTER (OUTPATIENT)
Dept: LAB | Age: 55
End: 2025-04-01
Payer: MEDICARE

## 2025-04-01 VITALS
RESPIRATION RATE: 16 BRPM | BODY MASS INDEX: 43.43 KG/M2 | WEIGHT: 236 LBS | DIASTOLIC BLOOD PRESSURE: 70 MMHG | OXYGEN SATURATION: 95 % | TEMPERATURE: 98 F | HEART RATE: 89 BPM | HEIGHT: 62 IN | SYSTOLIC BLOOD PRESSURE: 134 MMHG

## 2025-04-01 DIAGNOSIS — Z23 NEED FOR SHINGLES VACCINE: ICD-10-CM

## 2025-04-01 DIAGNOSIS — Z12.4 SCREENING FOR MALIGNANT NEOPLASM OF CERVIX: ICD-10-CM

## 2025-04-01 DIAGNOSIS — Z23 NEED FOR HEPATITIS B VACCINATION: ICD-10-CM

## 2025-04-01 DIAGNOSIS — Z23 NEED FOR HEPATITIS B VACCINATION: Primary | ICD-10-CM

## 2025-04-01 DIAGNOSIS — E11.21 DIABETIC NEPHROPATHY ASSOCIATED WITH TYPE 2 DIABETES MELLITUS (HCC): ICD-10-CM

## 2025-04-01 DIAGNOSIS — R79.89 LOW VITAMIN D LEVEL: ICD-10-CM

## 2025-04-01 DIAGNOSIS — N18.4 TYPE 2 DIABETES MELLITUS WITH STAGE 4 CHRONIC KIDNEY DISEASE, WITH LONG-TERM CURRENT USE OF INSULIN (HCC): ICD-10-CM

## 2025-04-01 DIAGNOSIS — E03.9 HYPOTHYROIDISM, UNSPECIFIED TYPE: ICD-10-CM

## 2025-04-01 DIAGNOSIS — E78.00 HYPERCHOLESTEREMIA: ICD-10-CM

## 2025-04-01 DIAGNOSIS — Z12.11 SCREENING FOR MALIGNANT NEOPLASM OF COLON: ICD-10-CM

## 2025-04-01 DIAGNOSIS — E11.22 TYPE 2 DIABETES MELLITUS WITH STAGE 4 CHRONIC KIDNEY DISEASE, WITH LONG-TERM CURRENT USE OF INSULIN (HCC): ICD-10-CM

## 2025-04-01 DIAGNOSIS — N18.4 CKD (CHRONIC KIDNEY DISEASE) STAGE 4, GFR 15-29 ML/MIN (HCC): ICD-10-CM

## 2025-04-01 DIAGNOSIS — Z12.31 ENCOUNTER FOR SCREENING MAMMOGRAM FOR MALIGNANT NEOPLASM OF BREAST: ICD-10-CM

## 2025-04-01 DIAGNOSIS — F33.1 MODERATE EPISODE OF RECURRENT MAJOR DEPRESSIVE DISORDER (HCC): ICD-10-CM

## 2025-04-01 DIAGNOSIS — I10 PRIMARY HYPERTENSION: ICD-10-CM

## 2025-04-01 DIAGNOSIS — Z23 NEED FOR PNEUMOCOCCAL VACCINATION: ICD-10-CM

## 2025-04-01 DIAGNOSIS — E55.9 VITAMIN D DEFICIENCY: ICD-10-CM

## 2025-04-01 DIAGNOSIS — Z79.4 TYPE 2 DIABETES MELLITUS WITH STAGE 4 CHRONIC KIDNEY DISEASE, WITH LONG-TERM CURRENT USE OF INSULIN (HCC): ICD-10-CM

## 2025-04-01 DIAGNOSIS — M21.371 FOOT DROP, RIGHT: ICD-10-CM

## 2025-04-01 DIAGNOSIS — Z00.00 ENCOUNTER FOR SUBSEQUENT ANNUAL WELLNESS VISIT (AWV) IN MEDICARE PATIENT: Primary | ICD-10-CM

## 2025-04-01 DIAGNOSIS — E11.42 DIABETIC POLYNEUROPATHY ASSOCIATED WITH TYPE 2 DIABETES MELLITUS (HCC): ICD-10-CM

## 2025-04-01 LAB
T4 FREE SERPL-MCNC: 1.3 NG/DL (ref 0.8–1.7)
TSI SER-ACNC: 3.05 UIU/ML (ref 0.55–4.78)
VIT D+METAB SERPL-MCNC: 44.2 NG/ML (ref 30–100)

## 2025-04-01 PROCEDURE — 90746 HEPB VACCINE 3 DOSE ADULT IM: CPT | Performed by: INTERNAL MEDICINE

## 2025-04-01 PROCEDURE — 84439 ASSAY OF FREE THYROXINE: CPT

## 2025-04-01 PROCEDURE — 84443 ASSAY THYROID STIM HORMONE: CPT

## 2025-04-01 PROCEDURE — G0010 ADMIN HEPATITIS B VACCINE: HCPCS | Performed by: INTERNAL MEDICINE

## 2025-04-01 PROCEDURE — 99499 UNLISTED E&M SERVICE: CPT | Performed by: INTERNAL MEDICINE

## 2025-04-01 PROCEDURE — 99214 OFFICE O/P EST MOD 30 MIN: CPT | Performed by: INTERNAL MEDICINE

## 2025-04-01 PROCEDURE — 82306 VITAMIN D 25 HYDROXY: CPT

## 2025-04-01 PROCEDURE — 36415 COLL VENOUS BLD VENIPUNCTURE: CPT

## 2025-04-01 PROCEDURE — G0438 PPPS, INITIAL VISIT: HCPCS | Performed by: INTERNAL MEDICINE

## 2025-04-01 NOTE — PROGRESS NOTES
Subjective:   Ceci Carroll is a 54 year old female who presents for a Initial Annual Wellness Visit (outside the first 12 months of Medicare eligibility, no prior AWV) and scheduled follow up of multiple significant but stable problems.   Preventative health - due for colonoscopy, mammogram, pap smear. Body mass index is 43.16 kg/m².  Hypertension, CKD IV - .  Following with Nephrology.  Has seen [facility] kidney transplant clinic.  Hypercholesterolemia - on statin therapy, tolerating.  DM II - complicated by neuropathy, nephropathy, CKD.  Following with Endocrinology.  A1c of 5.9% in December.  Hypothyroidism - no new symptoms.  Depression - stable, not on antidepressant therapy.  Right foot drop - just had Neurology office visit.  Referred to physical therapy, MRI ordered, EMG was done    History/Other:   Fall Risk Assessment:   She has been screened for Falls and is High Risk. Fall Prevention information provided to patient in After Visit Summary.    Do you feel unsteady when standing or walking?: (Patient-Rptd) Yes  Do you worry about falling?: (Patient-Rptd) Yes  Have you fallen in the past year?: (Patient-Rptd) No     Cognitive Assessment:   She had a completely normal cognitive assessment - see flowsheet entries       Functional Ability/Status:   Ceci Carroll has some abnormal functions as listed below:  She has Dressing and/or Bathing issues based on screening of functional status.  Difficulty dressing or bathing?: (Patient-Rptd) Yes  Bathing or Showering: (Patient-Rptd) Need some help  Dressing: (Patient-Rptd) Need some help  She has Driving difficulties based on screening of functional status. She has Meal Preparation difficulties based on screening of functional status.She has difficulties Shopping for Groceries based on screening of functional status. She has difficulties Affording Meds based on screening of functional status. She has Vision problems based on screening of functional  status. She has Walking problems based on screening of functional status. She has problems with Daily Activities based on screening of functional status.       Depression Screening (PHQ):  PHQ-2 SCORE: 0  , done 3/25/2025   Last [place] Suicide Screening on 4/1/2025 was No Risk.          Advanced Directives:   She does NOT have a Living Will. [Do you have a living will?: (Patient-Rptd) No]  She does NOT have a Power of  for Health Care. [Do you have a healthcare power of ?: (Patient-Rptd) No]  Discussed Advance Care Planning with patient (and family/surrogate if present). Standard forms made available to patient in After Visit Summary.      Patient Active Problem List   Diagnosis    Type 2 diabetes mellitus (HCC)    Cataract    Diabetic polyneuropathy associated with type 2 diabetes mellitus (HCC)    Diabetic nephropathy associated with type 2 diabetes mellitus (HCC)    Primary hypertension    Foot drop, right    Glaucoma    Hypercholesteremia    Hypothyroidism    Moderate episode of recurrent major depressive disorder (HCC)    CKD (chronic kidney disease) stage 4, GFR 15-29 ml/min ([facility])     Allergies:  She has No Known Allergies.    Current Medications:  Outpatient Medications Marked as Taking for the 4/1/25 encounter (Office Visit) with Albina Dolan MD   Medication Sig    amLODIPine 10 MG Oral Tab Take 1 tablet (10 mg total) by mouth daily.    atorvastatin 40 MG Oral Tab Take 1 tablet (40 mg total) by mouth nightly.    semaglutide (OZEMPIC, 0.25 OR 0.5 MG/DOSE,) 2 MG/3ML Subcutaneous Solution Pen-injector Inject 0.25 mg into the skin once a week.    insulin NPH & regular 70/30 (70-30) 100 Units/mL Subcutaneous Suspension 36 units in the am and 36 units in the pm with meals.    Insulin Pen Needle (BD PEN NEEDLE MIGUELANGEL U/F) 32G X 4 MM Does not apply Misc Used to inject twice daily.    levothyroxine 100 MCG Oral Tab Take 1 tablet, 100 mcg, daily on an empty stomach 30 minutes before breakfast.     losartan 25 MG Oral Tab Take 1 tablet (25 mg total) by mouth daily.    Levonorgestrel 20 MCG/DAY Intrauterine IUD 1 Intra Uterine Device by Intrauterine route one time.    Continuous Blood Gluc Sensor (FREESTYLE TERRY 3 SENSOR) Does not apply Misc 1 each every 14 (fourteen) days.    Continuous Blood Gluc  (FREESTYLE TERRY 3 READER) Does not apply Misc 1 each As Directed. Use with Terry 3 sensors to monitor blood glucose as directed.       Medical History:  She  has a past medical history of Diabetes (HCC), Eye disorder, Hypertension, and Non-healing non-surgical wound (2021).  Surgical History:  She  has a past surgical history that includes ; Laparoscopic cholecystectomy; back surgery; cholecystectomy; and EMG (3/31/2025).   Family History:  Her family history includes Diabetes in her brother, mother, and sister; Heart Disorder in her mother; Stroke in her sister.  Social History:  She  reports that she has never smoked. She has never used smokeless tobacco. She reports that she does not currently use drugs. She reports that she does not drink alcohol.    Tobacco:  She has never smoked tobacco.    CAGE Alcohol Screen:   CAGE screening score of 0 on 3/25/2025, showing low risk of alcohol abuse.      Patient Care Team:  Albina Dolan MD as PCP - General (Internal Medicine)    Review of Systems  GENERAL: feels well otherwise  SKIN: denies any unusual skin lesions  EYES: denies blurred vision or double vision  HEENT: denies nasal congestion, sinus pain or ST  LUNGS: denies shortness of breath with exertion  CARDIOVASCULAR: denies chest pain on exertion  GI: denies abdominal pain, denies heartburn  : denies dysuria, vaginal discharge or itching, no complaint of urinary incontinence   MUSCULOSKELETAL: denies acute back pain  NEURO: right foot drop, neuropathy  PSYCHE: depression, stable  HEMATOLOGIC: denies hx of anemia  ENDOCRINE: denies hot/cold intolerance  ALL/ASTHMA: denies hx of allergy  or asthma    Objective:   Physical Exam  /70 (BP Location: Right arm, Patient Position: Sitting, Cuff Size: large)   Pulse 89   Temp 97.6 °F (36.4 °C) (Temporal)   Resp 16   Ht 5' 2\" (1.575 m)   Wt 236 lb (107 kg)   SpO2 95%   Breastfeeding No   BMI 43.16 kg/m²  Estimated body mass index is 43.16 kg/m² as calculated from the following:    Height as of this encounter: 5' 2\" (1.575 m).    Weight as of this encounter: 236 lb (107 kg).  Medicare Hearing Assessment:   Hearing Screening    Time taken: 4/1/2025 11:05 AM  Entry User: Alexa Ross CMA  Screening Method: Finger Rub  Finger Rub Result: Pass         GENERAL: Alert and oriented, well developed, well nourished,in no apparent distress  SKIN: no rashes,no suspicious lesions  HEENT: atraumatic, PERRLA, EOMI, normal lid and conjunctiva  NECK: supple, no jvd, no thyromegaly  LUNGS: clear to auscultation bilaterally, no wheezing/rubs  CARDIO: RRR without murmurs.  No clubbing, cyanosis or edema.  GI: soft non tender nondistended no hepatosplenomegaly, bowel sounds throughout  NEURO: CN II-XII intact, in wheelchair  PSYCH: pleasant, appropriate mood and affect    Assessment & Plan:   Ceci Carroll is a 54 year old female who presents for a Medicare Assessment.   1. Encounter for subsequent annual wellness visit (AWV) in Medicare patient  2. Encounter for screening mammogram for malignant neoplasm of breast  3. Screening for malignant neoplasm of colon  4. Screening for malignant neoplasm of cervix  5. Need for shingles vaccine  6. Need for pneumococcal vaccination  7. Need for hepatitis B vaccination  Age appropriate health guidance and counseling provided.  Screening labs generally up to date through Endo/[facility].  Mammogram ordered.  Referred to GI for screening colonoscopy.  Negative Hep B titer last month - first Hep B shot given today.  Second shot in 1-2 months, third in 6 months.  Recommend Shingrix shots at local pharmacy.  Advised  her to check with  transplant team about if she needs Prevnar 20.  Information provided for [facility] for updated pap smear.  - [facility] 2D+3D SCREENING BILAT (CPT=77067/42088); Future  - Gastro Referral - In Network  - Hep B, Adult [74478]    8. Primary hypertension  At goal blood pressure.  Elevated readings at [facility] cardiologist's office last month - amlodipine started.  Reasonable reading at neurology office yesterday as well.  Continue amlodipine 10 mg every day, losartan 25 mg every day.    9. CKD (chronic kidney disease) stage 4, GFR 15-29 ml/min ([facility])  Following with [facility] Transplant clinic for work up.  Also sees Dr. Allan here at [facility].      10. Hypercholesteremia  LDL at goal in December.  Continue atorvastatin 40 mg qhs.    11. Type 2 diabetes mellitus with stage 4 chronic kidney disease, with long-term current use of insulin ([facility])  12. Diabetic nephropathy associated with type 2 diabetes mellitus ([facility])  13. Diabetic polyneuropathy associated with type 2 diabetes mellitus ([facility])  Following with Endocrinology.  A1c much improved - 5.9% in December.  Has Endo appointment later this week.  Diabetes is complicated by neuropathy, nephropathy, CKD.      14. Hypothyroidism, unspecified type  No new symptoms. TSH order in system from Endo.  Continue levothyroxine 100 mcg qAM.    15. Foot drop, right  Chronic issue.  Saw Neurology yesterday - EMG was done, MRI ordered.    16. Moderate episode of recurrent major depressive disorder (HCC)  Stable mood.  Not on medication therapy.    The patient indicates understanding of these issues and agrees to the plan.  Reinforced healthy diet, lifestyle, and exercise.      No follow-ups on file.     Albina Dolan MD, 4/1/2025     Supplementary Documentation:   General Health:  In the past six months, have you lost more than 10 pounds without trying?: (Patient-Rptd) 2 - No  Has your appetite been poor?: (Patient-Rptd) No  Type of Diet: (Patient-Rptd)  Low Carb  How does the patient maintain a good energy level?: (Patient-Rptd) Stretching  How would you describe your daily physical activity?: (Patient-Rptd) Light  How would you describe your current health state?: (Patient-Rptd) Fair  How do you maintain positive mental well-being?: (Patient-Rptd) Puzzles, Games, Visiting Family  On a scale of 0 to 10, with 0 being no pain and 10 being severe pain, what is your pain level?: (Patient-Rptd) 0 - (None)  In the past six months, have you experienced urine leakage?: (Patient-Rptd) 1-Yes  At any time do you feel concerned for the safety/well-being of yourself and/or your children, in your home or elsewhere?: (Patient-Rptd) No  Have you had any immunizations at another office such as Influenza, Hepatitis B, Tetanus, or Pneumococcal?: (Patient-Rptd) No    Health Maintenance   Topic Date Due    Colorectal Cancer Screening  Never done    Annual Physical  Never done    Mammogram  Never done    Pneumococcal Vaccine: 50+ Years (1 of 2 - PCV) Never done    DTaP,Tdap,and Td Vaccines (1 - Tdap) Never done    Pap Smear  Never done    Zoster Vaccines (1 of 2) Never done    COVID-19 Vaccine (1 - 2024-25 season) Never done    Annual Depression Screening  01/01/2025    Diabetes Care: Foot Exam (Annual)  Never done    Diabetes Care: Microalb/Creat Ratio (Annual)  01/01/2025    Diabetes Care A1C  08/19/2025    Diabetes Care Dilated Eye Exam  09/25/2025    Influenza Vaccine (Season Ended) 10/01/2025    Diabetes Care: GFR  12/30/2025    Meningococcal B Vaccine  Aged Out

## 2025-04-01 NOTE — PATIENT INSTRUCTIONS
- Get blood tests done for Maria Elena/Dr. House today (do not have to be fasting)  - Schedule mammogram through [facility] or call central scheduling at [phone]  - Can follow up with our [place] gynecologists for your pap smear (make sure to cancel [facility] Gynecology appointment for April 9th if you can't make that):  [address]  [address]  [place] [address]  [phone]    - Follow up with [facility] for colonoscopy:  [address] [place] [address]  (On [address] & [address])  [phone]    - First Hepatitis B shot given today. You will need second shot in 1-2 months, third shot in 6 months (can schedule nurse visits for these shots)  - Consider Shingrix shots (can get these at your pharmacy)  - Ask the [facility] transplant team if you need a pneumonia shot (Prevnar 20); if so you can schedule nurse visit in our office for this or get it at your local pharmacy.    It was a pleasure seeing you in the clinic today.  Thank you for choosing the [facility] [place] office for your healthcare needs. Please call at [phone] with any questions or concerns.    Albina Dolan MD

## 2025-04-03 ENCOUNTER — TELEMEDICINE (OUTPATIENT)
Facility: CLINIC | Age: 55
End: 2025-04-03
Payer: MEDICARE

## 2025-04-03 ENCOUNTER — TELEPHONE (OUTPATIENT)
Facility: CLINIC | Age: 55
End: 2025-04-03

## 2025-04-03 DIAGNOSIS — E03.9 HYPOTHYROIDISM, UNSPECIFIED TYPE: ICD-10-CM

## 2025-04-03 DIAGNOSIS — E55.9 VITAMIN D DEFICIENCY: Primary | ICD-10-CM

## 2025-04-03 DIAGNOSIS — Z79.4 TYPE 2 DIABETES MELLITUS WITH DIABETIC NEUROPATHY, WITH LONG-TERM CURRENT USE OF INSULIN (HCC): ICD-10-CM

## 2025-04-03 DIAGNOSIS — E11.69 HYPERLIPIDEMIA ASSOCIATED WITH TYPE 2 DIABETES MELLITUS (HCC): ICD-10-CM

## 2025-04-03 DIAGNOSIS — E78.5 HYPERLIPIDEMIA ASSOCIATED WITH TYPE 2 DIABETES MELLITUS (HCC): ICD-10-CM

## 2025-04-03 DIAGNOSIS — E11.40 TYPE 2 DIABETES MELLITUS WITH DIABETIC NEUROPATHY, WITH LONG-TERM CURRENT USE OF INSULIN (HCC): ICD-10-CM

## 2025-04-03 PROCEDURE — 95249 CONT GLUC MNTR PT PROV EQP: CPT | Performed by: STUDENT IN AN ORGANIZED HEALTH CARE EDUCATION/TRAINING PROGRAM

## 2025-04-03 PROCEDURE — 99214 OFFICE O/P EST MOD 30 MIN: CPT | Performed by: STUDENT IN AN ORGANIZED HEALTH CARE EDUCATION/TRAINING PROGRAM

## 2025-04-03 RX ORDER — LEVOTHYROXINE SODIUM 100 UG/1
TABLET ORAL
Qty: 90 TABLET | Refills: 1 | Status: SHIPPED | OUTPATIENT
Start: 2025-04-03

## 2025-04-03 NOTE — PROGRESS NOTES
[facility]    Name: Ceci Carroll  Date: 4/3/2025    No chief complaint on file.  Patient verbally consents to a Video/Telephone service for this visit.  Patient understands and accepts financial responsibility for any deductible, co-insurance and/or co-pays associated with this service.      Subjective:   Initial HPI 11/2023- with Dr. House  Diabetes was diagnosed at age 27.  Initially started on Metformin. She did endorse noncompliance at that time. She was started on insulin around her early 30s.   Has tried lantus, novolog. Currently on Novolin 70/30 50u BID  In 2015, she had a bad infection and stopped all her meds at that time; was hospitalized. Restarted therapy after discharge.  In 2019, A1c of 15% and noted slower gait and weakness. Diagnosed with groin infection and gangrenous GB. States she was very sick and after that she became very serious about her diabetes management.   She noted worsening neuropathy after 2019. Her A1c was 6.1% in 2022 while on Novolin. Made significant lifestyle changes and was compliant with medications.  Today, A1c POC is 8.4%  Uses wheelchair and roller to get around; feels she is getting stronger in terms of her right drop foot. Does not drive  Family hx: mother with DM, brother with DM (passed away), sister with DM  Polyuria/polydipsia:  No Going every 4-5 hours (which is better than previously)  Blurred vision:  Yes:       Diabetes Treatment history   Duration of therapy with insulin: since age 32   Metformin: not compliant  Lantus/Humalog: too expensive     Current Regimen for diabetes:   Oral/Injectable medications: denies   Basal:  Novolin 50u BID . Injects in abdomen  Lantus/novolog was more expensive   Prandial:  denies  Missed doses: endorses compliance     Interim hx:  12/30/2024-darcy/ Karlie PERRY.Last A1c value was 5.9% done 12/30/2024.  Here with her  Valentino and son Valentino  Last office visit, reduced 70/30 dose due to lows.  Continues to have hypo episodes  Ozempic patient assistance program pending for 2025, so hasn't started it  Had a malfunctioning Claudia   Asking for neurologist referral for foot drop, still in wheelchair, cannot tolerate activity due to weakness    DM meds at visit:    - insulin 70/30 40 units in AM,  40-45u w/ dinner   - Ozempic 0.25mg weekly via patient assistance program (titrate insulin PRN)  - Freestyle Claudia 3 CGM      Thyroid: taking levothyroxine 75mcg 6d/week, 100mcg on Sundays. No missed doses. Getting labs after this appointment.     Continuous Glucose Monitoring Interpretation  Ceci Carroll has undergone continuous glucose monitoring with their CGM.  The blood glucose tracings were evaluated for two weeks prior to office visit.   Blood glucose tracings demonstrated areas of hyperglycemia post-meal, particularly post-dinner- these are rare There were several hypoglycemia episodes- no distinct pattern.      Interval Hx 04/03/25 with Dr. MANLEY  Labs: 4/1/2025 TSH 3, free T41.3, vitamin D 44  Since last office visit, CGM with very low 1% and low 8% --> very low 0% and low 2%    Started taking ozempic 0.25 mg weekly 3/14 due to delay with pharmacy    DM Meds: insulin NPH & regular 70/30 Susp - (70-30) 100 Units/mL 36 units BID; Ozempic 0.25 mg weekly    Started and completed Vit D 50,000IU once weekly x 12 weeks, has not started daily 1000IU daily.     Taking levothyroxine 100mcg daily and notes compliance     Will be making appointments with specialists in preparation for transplant evaluation        Continuous Glucose Monitoring Interpretation  Ceci Carroll has undergone continuous glucose monitoring with their CGM.  The blood glucose tracings were evaluated for two weeks prior to office visit.   Blood glucose tracings demonstrated areas of hyperglycemia post-meal, particularly post-dinner There were  several hypoglycemia episodes- no distinct pattern . Does note symptoms when she is  low.                History/Other:    Allergies, PMH, SocHx and FHx reviewed and updated as appropriate in Epic on    levothyroxine 100 MCG Oral Tab Take 1 tablet, 100 mcg, daily on an empty stomach 30 minutes before breakfast. 90 tablet 1     No Known Allergies  Current Outpatient Medications   Medication Sig Dispense Refill    levothyroxine 100 MCG Oral Tab Take 1 tablet, 100 mcg, daily on an empty stomach 30 minutes before breakfast. 90 tablet 1    amLODIPine 10 MG Oral Tab Take 1 tablet (10 mg total) by mouth daily.      atorvastatin 40 MG Oral Tab Take 1 tablet (40 mg total) by mouth nightly. 90 tablet 1    semaglutide (OZEMPIC, 0.25 OR 0.5 MG/DOSE,) 2 MG/3ML Subcutaneous Solution Pen-injector Inject 0.25 mg into the skin once a week. 3 mL 1    insulin NPH & regular 70/30 (70-30) 100 Units/mL Subcutaneous Suspension 36 units in the am and 36 units in the pm with meals. 30 mL 1    Insulin Pen Needle (BD PEN NEEDLE MIGUELANGEL U/F) 32G X 4 MM Does not apply Misc Used to inject twice daily. 100 each 1    losartan 25 MG Oral Tab Take 1 tablet (25 mg total) by mouth daily. 90 tablet 1    Potassium Chloride ER 10 MEQ Oral Tab CR Take 1 tablet (10 mEq total) by mouth daily. (Patient not taking: Reported on 4/1/2025) 30 tablet 0    Levonorgestrel 20 MCG/DAY Intrauterine IUD 1 Intra Uterine Device by Intrauterine route one time.      Continuous Blood Gluc Sensor (FREESTYLE TERRY 3 SENSOR) Does not apply Misc 1 each every 14 (fourteen) days. 2 each 2    Continuous Blood Gluc  (FREESTYLE TERRY 3 READER) Does not apply Misc 1 each As Directed. Use with Terry 3 sensors to monitor blood glucose as directed. 1 each 0     Past Medical History:    Diabetes (HCC)    Eye disorder    Hypertension    Non-healing non-surgical wound     Family History   Problem Relation Age of Onset    Diabetes Mother     Heart Disorder Mother     Diabetes Sister     Stroke Sister     Diabetes Brother      Social history: Reviewed.      ROS/Exam     REVIEW OF SYSTEMS: Ten point review of systems has been performed and is otherwise negative and/or non-contributory, except as described above.     VITALS  There were no vitals filed for this visit.      Wt Readings from Last 6 Encounters:   04/01/25 236 lb (107 kg)   03/31/25 225 lb (102.1 kg)   03/05/25 236 lb 8 oz (107.3 kg)   11/08/24 208 lb (94.3 kg)   09/05/24 220 lb (99.8 kg)   05/20/24 220 lb (99.8 kg)     PHYSICAL EXAM  There were no vitals filed for this visit.  PHYSICAL EXAM- limited due to telemedicine encounter    Constitutional Not in acute distress  Pulmonary: no wheezing heard, no coughing on the phone. Speaking in full sentences.  Neurological:  Alert and oriented to person, place and time.   Psychiatric: Normal affect, mood and behavior appropriate      Labs/Imaging: Pertinent imaging reviewed.    Overall glucose control:  Lab Results   Component Value Date    A1C 5.9 (A) 12/30/2024    A1C 6.6 (H) 09/05/2024    A1C 7.3 (H) 05/20/2024    A1C 8.4 (A) 11/22/2023    A1C >14.0 06/15/2019       Supplementary Documentation:   -Surveillance for Diabetes Complications & Risks  Foot exam/neuropathy: No data recorded  Retinopathy screening: Last Dilated Eye Exam: 09/25/24  Eye Exam shows Diabetic Retinopathy?: No      Assessment & Plan:     ICD-10-CM    1. Vitamin D deficiency  E55.9       2. Hypothyroidism, unspecified type  E03.9 levothyroxine 100 MCG Oral Tab      3. Type 2 diabetes mellitus with diabetic neuropathy, with long-term current use of insulin ([facility])  E11.40     Z79.4       4. Hyperlipidemia associated with type 2 diabetes mellitus ([facility])  E11.69     E78.5           Ceci KINSEY PatiLew is a pleasant 54 year old female here for evaluation of:    #Type 2 Diabetes- PMHx of Type 2 diabetes mellitus diagnosed age 27. 2/2024 cpeptide 3.9.  Managed on insulin only regimen. Awaiting ozempic patient assistance program approval. For now, reducing insulin given hypoglycemia.  Patient started on Ozempic  0.25 mg weekly 1/21/2025 which was increased to 0.5 weekly with NPH 36 units twice daily    Last A1c value was 5.9% done 12/30/2024. Goal <7%. Importance of better glucose control in preventing onset/progression of end-organ damage discussed, as well as goals of therapy and clinical significance of A1C.     - med changes:  -Reduce insulin 70/30 36u twice daily --> 34 units twice daily   -Continue Ozempic 0.25mg weekly (titrate insulin PRN) with plan to increase to 0.5 mg weekly if tolerating   - continue [facility] FIMAe 3 CGM    - would avoid SGLT2i given GFR <20, new start not recommended  - avoid metformin, avoid DPP4i other than linagliptin given GFR <20  - continue to check BG 3-4x/day using glucometer or CGM  -See above header \"Supplementary Documentation\" for surveillance for diabetes complications & risks       #CKD stage 4  - follows w/ Dr. Allan nephrology  - BG control as above   - Taking ARB  - has appointment with [facility] team in Feb 2025   Lab Results   Component Value Date    EGFRCR 15 (L) 12/30/2024    MICROALBCREA 1,445.3 (H) 11/02/2024      #Hypertension  -stable continue follow up with PCP/nephro  BP Readings from Last 1 Encounters:   04/01/25 134/70   BP Meds: amLODIPine Tabs - 10 MG; losartan Tabs - 25 MG     #Hyperlipidemia  - LDL is not to goal <70   Lab Results   Component Value Date    LDL 40 12/30/2024    TRIG 64 12/30/2024   Cholesterol Meds: atorvastatin Tabs - 40 MG      #Hypothyroidism  2/2024 TSH 11.10, fT4 0.80, restarted LT4 50mcg daily.   5/2024 TSH 5.22  9/2024 TSH 9, still taking 50mcg daily, forgot to incr dose  11/2024 TSH 11, currently taking 50 mcg M-Sa + 100 mcg Manzanares for the last 3 months; dose was incr'd   3/2025 TSH 3.0    - taking levothyroxine 100 mcg daily, continue  -Repeat labs 9/2025 prior to follow up      #Hypokalemia  Continue 10 meq of K  Pt to complete labs and follow up with PCP or nephrology re: next steps    #Diabetic neuropathy  Gave information for  neurologist re: neuropathy/foot drop, and podiatry for nail care. Uses wheelchair. Foot exam complete 12/2024, sensation intact. Reviewed foot care practices at previous visit     #Chronic fatigue  Reviewed that fatigue is often multifactorial and we have reviewed the common etiologies, both endocrine and non-endocrine.  - thyroid management plan as noted above  - no anemia per CBC   - vit D- WNL 3/2025  - defer vit B12 for now  - reviewed since all above normal, pt will continue to follow up with PCP      #Vitamin D Deficiency  Lab Results   Component Value Date    VITD 44.2 04/01/2025   Started and completed Vit D 50,000IU once weekly x 12 weeks, has not started daily 1000IU daily.   -Start 1000 units daily of vitamin D and repeat labs in 6 months    Return in about 3 months (around 7/3/2025).    The above plan was discussed in detail with the patient who verbalized understanding and agreement.      Chayo House,   [facility] Endocrinology  4/3/2025     In reviewing this note, please be advised that Dragon Voice Recognition software used to dictate the note may have made errors in recognizing some of the words or phrases.     Note to patient: The 21 Century Cures Act makes medical notes like these available to patients in the interest of transparency. However, be advised this is a medical document. It is intended as peer to peer communication. It is written in medical language and may contain abbreviations or verbiage that are unfamiliar. It may appear blunt or direct. Medical documents are intended to carry relevant information, facts as evident, and the clinical opinion of the practitioner.

## 2025-04-03 NOTE — PATIENT INSTRUCTIONS
Vitamin D  -Your most recent level is above 30, which is at goal!  -Please start 1000 units of vitamin daily. You can get this over the counter  -Please take this at least 4 hours after your thyroid medication (lunch or dinner)  -We will repeat levels in 6 months     Thyroid  -Continue Levothyroxine 100 mcg daily   -We will repeat levels in 6 months     Diabetes  -Decrease NPH 70/30 insulin 36 units twice daily --> 34 units twice daily   -Continue ozempic 0.25 mg weekly   -After 4 weeks of taking your current dose, please increase your ozempic to 0.5 mg weekly   -Continue this dose until your follow up visit with Karlie     Cholesterol  -Continue Atorvastatin 40 mg daily

## 2025-04-03 NOTE — TELEPHONE ENCOUNTER
Received fax from [employer] requesting refill/reorder form be completed and faxed back to them. Filled out patient and office demographic information. Placed in provider in basket for review and signing.

## 2025-04-11 ENCOUNTER — TELEPHONE (OUTPATIENT)
Dept: NEUROLOGY | Facility: CLINIC | Age: 55
End: 2025-04-11

## 2025-04-11 DIAGNOSIS — M21.379 FOOT-DROP, UNSPECIFIED LATERALITY: Primary | ICD-10-CM

## 2025-04-11 NOTE — TELEPHONE ENCOUNTER
Patient requesting order for AFO    R foot drop suspect right L5 lumbar radiculopathy based on initial symptoms in summer of 2024. However, cannot rule out peroneal neuropathy superimposed on diabetic severe sensory motor polyneuropathy  Start PT and AFO

## 2025-04-20 ENCOUNTER — HOSPITAL ENCOUNTER (OUTPATIENT)
Dept: MRI IMAGING | Age: 55
End: 2025-04-20
Attending: Other
Payer: MEDICARE

## 2025-04-20 ENCOUNTER — HOSPITAL ENCOUNTER (OUTPATIENT)
Dept: MRI IMAGING | Age: 55
Discharge: HOME | End: 2025-04-20
Attending: Other
Payer: MEDICARE

## 2025-04-20 DIAGNOSIS — R29.898 PROXIMAL LEG WEAKNESS: ICD-10-CM

## 2025-04-20 DIAGNOSIS — M21.371 FOOT DROP, RIGHT FOOT: ICD-10-CM

## 2025-04-20 PROCEDURE — 72148 MRI LUMBAR SPINE W/O DYE: CPT | Performed by: OTHER

## 2025-04-24 ENCOUNTER — TELEPHONE (OUTPATIENT)
Facility: CLINIC | Age: 55
End: 2025-04-24

## 2025-04-24 NOTE — TELEPHONE ENCOUNTER
Received shipment from [employer] containing patients 4 boxes of Ozempic. Placed in med fridge until patient is able to .     Received:   Ozempic 0.25/0.5 1x3ML prefilled pen  Lot: [id number]  Exp: 09/30/2027  Qty: 4

## 2025-05-06 ENCOUNTER — MED REC SCAN ONLY (OUTPATIENT)
Facility: CLINIC | Age: 55
End: 2025-05-06

## 2025-05-07 DIAGNOSIS — I10 PRIMARY HYPERTENSION: Primary | ICD-10-CM

## 2025-05-08 ENCOUNTER — TELEPHONE (OUTPATIENT)
Facility: CLINIC | Age: 55
End: 2025-05-08

## 2025-05-08 NOTE — TELEPHONE ENCOUNTER
Received fax from [employer] stating patient is eligible for reorder of Novolog and NovoFine Tip needles. A 120 day supply will be mailed to the clinic in 10-14 business days. Will await medication delivery.

## 2025-05-08 NOTE — TELEPHONE ENCOUNTER
LOV: 4/1/2025 with Dr. Dolan  RTC: 1 year  Last Relevant Labs: 4/1/2025    Future Appointments   Date Time Provider Department Center   5/10/2025 10:30 AM Gaby Hogue MD [facility] M [facility] [place]   6/2/2025 11:30 AM [facility] 08 NURSE [facility] 8 [facility] [place]   6/3/2025 11:20 AM PF ESTHER RM2 PF [facility] [place]   6/5/2025 10:00 AM Maria Elena Craft APN [facility] [place]   6/19/2025 11:00 AM Rajan Delarosa MD ENINAPER2 [facility] [place]   9/16/2025 11:00 AM Jacque Allan MD [facility] [place]   9/18/2025 11:35 AM Romy Puentes DO ENINAPER [facility] [place]     Assessment & Plan 4/1/2025:  \"8. Primary hypertension  At goal blood pressure.  Elevated readings at [facility] cardiologist's office last month - amlodipine started.  Reasonable reading at neurology office yesterday as well.  Continue amlodipine 10 mg every day, losartan 25 mg every day.\"

## 2025-05-09 RX ORDER — AMLODIPINE BESYLATE 10 MG/1
10 TABLET ORAL DAILY
Qty: 90 TABLET | Refills: 1 | Status: SHIPPED | OUTPATIENT
Start: 2025-05-09

## 2025-05-10 ENCOUNTER — OFFICE VISIT (OUTPATIENT)
Facility: CLINIC | Age: 55
End: 2025-05-10
Payer: MEDICARE

## 2025-05-10 VITALS
BODY MASS INDEX: 42.33 KG/M2 | HEIGHT: 62 IN | DIASTOLIC BLOOD PRESSURE: 68 MMHG | SYSTOLIC BLOOD PRESSURE: 136 MMHG | WEIGHT: 230 LBS | HEART RATE: 78 BPM

## 2025-05-10 DIAGNOSIS — Z30.432 ENCOUNTER FOR REMOVAL OF INTRAUTERINE CONTRACEPTIVE DEVICE: ICD-10-CM

## 2025-05-10 DIAGNOSIS — Z12.31 ENCOUNTER FOR SCREENING MAMMOGRAM FOR BREAST CANCER: Primary | ICD-10-CM

## 2025-05-10 DIAGNOSIS — Z12.4 PAPANICOLAOU SMEAR FOR CERVICAL CANCER SCREENING: ICD-10-CM

## 2025-05-10 PROCEDURE — 87624 HPV HI-RISK TYP POOLED RSLT: CPT | Performed by: OBSTETRICS & GYNECOLOGY

## 2025-05-10 PROCEDURE — 88175 CYTOPATH C/V AUTO FLUID REDO: CPT | Performed by: OBSTETRICS & GYNECOLOGY

## 2025-05-10 NOTE — PROGRESS NOTES
IUD Removal     Consent signed.    Procedure discussed with the patient in detail including indication, risks, benefits, alternatives and complications.    Pelvic Exam Findings:  Lesion description:  IUD strings seen from cervix    Procedure:  Speculum placed in the vagina.  Betadine wash of vagina and cervix.  An Endocervical speculum was used to visualize strings.  A clamp used to grasp IUD strings.  Mirena IUD was removed without difficulty.  The patient tolerated the procedure well.      Visit Plan:  Discharge instructions were reviewed with the patient.

## 2025-05-10 NOTE — PROGRESS NOTES
Ceci Carroll is a 55 year old female  No LMP recorded. (Menstrual status: IUD - Intrauterine Device).   Chief Complaint   Patient presents with    Wellness Visit     Last pap 8 years ago per pt neg     Procedure     Mirena inserted 8 years ago per pt, time for removal    .     She has had the Mirena IUD for 8 years does not have any vaginal bleeding no hot flashes or night sweats no vaginal dryness.  She has high blood pressure and is followed by her primary she is on blood pressure medicine.  She takes vitamin D.  Blood work is done with her primary she has not had a colonoscopy but she has the referral.  No colon cancer in her family.    OBSTETRICS HISTORY:  OB History    Para Term  AB Living   2 1 1  1 1   SAB IAB Ectopic Multiple Live Births   1    1      # Outcome Date GA Lbr Gil/2nd Weight Sex Type Anes PTL Lv   2 Term 07   9 lb 6 oz (4.252 kg) M Caesarean   DIONNE   1 SAB                GYNE HISTORY:  Periods none due to menopause    History   Sexual Activity    Sexual activity: Not Currently        Hx Prior Abnormal Pap: No  Pap Date:  (8 years ago per pt)  Pap Result Notes: neg per pt        MEDICAL HISTORY:  Past Medical History[1]    SURGICAL HISTORY:  Past Surgical History[2]    SOCIAL HISTORY:  Social History     Socioeconomic History    Marital status:      Spouse name: Not on file    Number of children: Not on file    Years of education: Not on file    Highest education level: Not on file   Occupational History    Not on file   Tobacco Use    Smoking status: Never    Smokeless tobacco: Never   Vaping Use    Vaping status: Never Used   Substance and Sexual Activity    Alcohol use: Never    Drug use: Never    Sexual activity: Not Currently   Other Topics Concern    Caffeine Concern No    Exercise No    Seat Belt Yes    Special Diet No    Stress Concern No    Weight Concern Yes   Social History Narrative    Not on file     Social Drivers of Health     Food  Insecurity: No Food Insecurity (4/1/2025)    NCSS - Food Insecurity     Worried About Running Out of Food in the Last Year: No     Ran Out of Food in the Last Year: No   Transportation Needs: No Transportation Needs (4/1/2025)    NCSS - Transportation     Lack of Transportation: No   Stress: Not on file   Housing Stability: Not At Risk (4/1/2025)    NCSS - Housing/Utilities     Has Housing: Yes     Worried About Losing Housing: No     Unable to Get Utilities: No       FAMILY HISTORY:  Family History[3]    MEDICATIONS:  Medications - Current[4]    ALLERGIES:  Allergies[5]      Review of Systems:  Constitutional:  Denies fatigue, night sweats, hot flashes  Gastrointestinal:  denies heartburn, abdominal pain, diarrhea or constipation  Genitourinary:  denies dysuria, incontinence, abnormal vaginal discharge, vaginal itching  Skin/Breast:  Denies any breast pain, lumps, or discharge.   Neurological:  denies headaches, extremity weakness or numbness.      PHYSICAL EXAM:   Constitutional: well developed, well nourished  Head/Face: normocephalic  Neck/Thyroid: thyroid symmetric, no thyromegaly, no nodules, no adenopathy  Breast: normal without palpable masses, tenderness, asymmetry, nipple discharge, nipple retraction or skin changes  Abdomen:  soft, nontender, nondistended, no masses  Skin/Hair: no unusual rashes or bruises   Extremities: no edema, no cyanosis  Psychiatric:  Oriented to time, place, person and situation. Appropriate mood and affect    Pelvic Exam:  External Genitalia: normal appearance, hair distribution, and no lesions  Urethral Meatus:  normal in size, location, without lesions and prolapse  Bladder:  No fullness, masses or tenderness  Vagina:  Normal appearance without lesions, no abnormal discharge  Cervix:  Normal without tenderness on motion without lesions Pap.  Strings seen  Uterus: normal in size, contour, position, mobility, without tenderness  Adnexa: normal without masses or  tenderness  Perineum: normal  Anus: no hemorroids     Assessment & Plan:  Ceci was seen today for wellness visit and procedure.    Diagnoses and all orders for this visit:    Encounter for screening mammogram for breast cancer  -     URINE CULTURE, ROUTINE [395] [Q]; Future  -     [facility] CONSTANTINO 2D+3D SCREENING BILAT (CPT=77067/82817); Future    Papanicolaou smear for cervical cancer screening  -     ThinPrep PAP with HPV Reflex Request B; Future        Remove IUD             [1]   Past Medical History:   Diabetes (HCC)    Diabetes mellitus (HCC)    Eye disorder    Hypertension    Non-healing non-surgical wound    Thyroid disease   [2]   Past Surgical History:  Procedure Laterality Date          Cataract      Cholecystectomy      [facility]  2025    Insert intrauterine device  2017    Laparoscopic cholecystectomy     [3]   Family History  Problem Relation Age of Onset    Diabetes Mother     Heart Disorder Mother     Diabetes Sister     Stroke Sister     Diabetes Brother    [4]   Current Outpatient Medications:     amLODIPine 10 MG Oral Tab, Take 1 tablet (10 mg total) by mouth daily., Disp: 90 tablet, Rfl: 1    levothyroxine 100 MCG Oral Tab, Take 1 tablet, 100 mcg, daily on an empty stomach 30 minutes before breakfast., Disp: 90 tablet, Rfl: 1    atorvastatin 40 MG Oral Tab, Take 1 tablet (40 mg total) by mouth nightly., Disp: 90 tablet, Rfl: 1    semaglutide (OZEMPIC, 0.25 OR 0.5 MG/DOSE,) 2 MG/3ML Subcutaneous Solution Pen-injector, Inject 0.25 mg into the skin once a week., Disp: 3 mL, Rfl: 1    insulin NPH & regular 70/30 (70-30) 100 Units/mL Subcutaneous Suspension, 36 units in the am and 36 units in the pm with meals., Disp: 30 mL, Rfl: 1    Insulin Pen Needle (BD PEN NEEDLE MIGUELANGEL U/F) 32G X 4 MM Does not apply Misc, Used to inject twice daily., Disp: 100 each, Rfl: 1    losartan 25 MG Oral Tab, Take 1 tablet (25 mg total) by mouth daily., Disp: 90 tablet, Rfl: 1    Levonorgestrel 20 MCG/DAY Intrauterine  IUD, 1 Intra Uterine Device by Intrauterine route one time., Disp: , Rfl:     Continuous Blood Gluc Sensor (FREESTYLE TERRY 3 SENSOR) Does not apply Misc, 1 each every 14 (fourteen) days., Disp: 2 each, Rfl: 2    Continuous Blood Gluc  (FREESTYLE TERRY 3 READER) Does not apply Misc, 1 each As Directed. Use with Terry 3 sensors to monitor blood glucose as directed., Disp: 1 each, Rfl: 0    Potassium Chloride ER 10 MEQ Oral Tab CR, Take 1 tablet (10 mEq total) by mouth daily. (Patient not taking: Reported on 5/10/2025), Disp: 30 tablet, Rfl: 0  [5] No Known Allergies

## 2025-05-13 ENCOUNTER — MED REC SCAN ONLY (OUTPATIENT)
Facility: CLINIC | Age: 55
End: 2025-05-13

## 2025-05-13 DIAGNOSIS — I10 PRIMARY HYPERTENSION: ICD-10-CM

## 2025-05-13 LAB — HPV E6+E7 MRNA CVX QL NAA+PROBE: NEGATIVE

## 2025-05-14 DIAGNOSIS — E11.40 TYPE 2 DIABETES MELLITUS WITH DIABETIC NEUROPATHY, WITH LONG-TERM CURRENT USE OF INSULIN (HCC): ICD-10-CM

## 2025-05-14 DIAGNOSIS — N18.4 CKD (CHRONIC KIDNEY DISEASE) STAGE 4, GFR 15-29 ML/MIN (HCC): ICD-10-CM

## 2025-05-14 DIAGNOSIS — Z79.4 TYPE 2 DIABETES MELLITUS WITH DIABETIC NEUROPATHY, WITH LONG-TERM CURRENT USE OF INSULIN (HCC): ICD-10-CM

## 2025-05-14 DIAGNOSIS — E78.5 HYPERLIPIDEMIA, UNSPECIFIED HYPERLIPIDEMIA TYPE: ICD-10-CM

## 2025-05-14 RX ORDER — AMLODIPINE BESYLATE 10 MG/1
10 TABLET ORAL DAILY
Qty: 90 TABLET | Refills: 1 | OUTPATIENT
Start: 2025-05-14

## 2025-05-14 RX ORDER — POTASSIUM CHLORIDE 750 MG/1
10 TABLET, EXTENDED RELEASE ORAL DAILY
Qty: 90 TABLET | Refills: 1 | Status: SHIPPED | OUTPATIENT
Start: 2025-05-14

## 2025-05-14 NOTE — TELEPHONE ENCOUNTER
Pt is requesting refill on potassium. Pt talked to Lacy and still see her. Would like like to see if you can take over refills.

## 2025-05-14 NOTE — TELEPHONE ENCOUNTER
Amlodipine 10 mg- Too early for refill    Hypertension Medications Protocol [id number]/13/2025 08:37 PM   Protocol Details EGFRCR or GFRNAA > 50      Filled 5-9-25  Qty 90  1 refill

## 2025-06-02 ENCOUNTER — NURSE ONLY (OUTPATIENT)
Dept: INTERNAL MEDICINE CLINIC | Facility: CLINIC | Age: 55
End: 2025-06-02
Payer: MEDICARE

## 2025-06-02 ENCOUNTER — TELEPHONE (OUTPATIENT)
Dept: NEUROLOGY | Facility: CLINIC | Age: 55
End: 2025-06-02

## 2025-06-02 PROCEDURE — G0010 ADMIN HEPATITIS B VACCINE: HCPCS | Performed by: INTERNAL MEDICINE

## 2025-06-02 PROCEDURE — 90746 HEPB VACCINE 3 DOSE ADULT IM: CPT | Performed by: INTERNAL MEDICINE

## 2025-06-02 NOTE — TELEPHONE ENCOUNTER
Spoke with patient stated she has cleared things up with  and will call us if she needs anything.

## 2025-06-02 NOTE — PROGRESS NOTES
Patient came in for NV for 2nd Hep. B vaccine. This RN administered vaccine on patient's right deltoid. Patient tolerated vaccine. No questions or concerns.

## 2025-06-02 NOTE — TELEPHONE ENCOUNTER
Pt states she was referred to [facility]. She has moved from [place] to [place]. She is requesting a new order for AFO. Call disconnected. Pt's best cb # [phone]. Endorsed to RN.

## 2025-06-16 ENCOUNTER — MED REC SCAN ONLY (OUTPATIENT)
Facility: CLINIC | Age: 55
End: 2025-06-16

## 2025-06-16 ENCOUNTER — TELEPHONE (OUTPATIENT)
Facility: CLINIC | Age: 55
End: 2025-06-16

## 2025-06-16 NOTE — TELEPHONE ENCOUNTER
Received fax from [employer] stating that patient is eligible to receive a reorder for OZEMPIC 0.25/.5MG   A 120 day supply will be filled within 10-14 business days from fulfillment.  Sending HCP Notification forms to scan, closing Encounter

## 2025-06-19 ENCOUNTER — OFFICE VISIT (OUTPATIENT)
Dept: SURGERY | Facility: CLINIC | Age: 55
End: 2025-06-19
Payer: MEDICARE

## 2025-06-19 VITALS — HEART RATE: 80 BPM

## 2025-06-19 DIAGNOSIS — M48.061 LUMBAR STENOSIS WITHOUT NEUROGENIC CLAUDICATION: ICD-10-CM

## 2025-06-19 DIAGNOSIS — M21.371 FOOT DROP, RIGHT: Primary | ICD-10-CM

## 2025-06-19 PROCEDURE — 99204 OFFICE O/P NEW MOD 45 MIN: CPT | Performed by: NEUROLOGICAL SURGERY

## 2025-06-19 NOTE — PATIENT INSTRUCTIONS
Refill policies:    Allow 2-3 business days for refills; controlled substances may take longer.  Contact your pharmacy at least 5 days prior to running out of medication and have them send an electronic request or submit request through the “request refill” option in your [employer] account.  Refills are not addressed on weekends; covering physicians do not authorize routine medications on weekends.  No narcotics or controlled substances are refilled after noon on Fridays or by on call physicians.  By law, narcotics must be electronically prescribed.  A 30 day supply with no refills is the maximum allowed.  If your prescription is due for a refill, you may be due for a follow up appointment.  To best provide you care, patients receiving routine medications need to be seen at least once a year.  Patients receiving narcotic/controlled substance medications need to be seen at least once every 3 months.  In the event that your preferred pharmacy does not have the requested medication in stock (e.g. Backordered), it is your responsibility to find another pharmacy that has the requested medication available.  We will gladly send a new prescription to that pharmacy at your request.    Scheduling Tests:    If your physician has ordered radiology tests such as MRI or CT scans, please contact Central Scheduling at [phone] right away to schedule the test.  Once scheduled, the [facility] Centralized Referral Team will work with your insurance carrier to obtain pre-certification or prior authorization.  Depending on your insurance carrier, approval may take 3-10 days.  It is highly recommended patients assure they have received an authorization before having a test performed.  If test is done without insurance authorization, patient may be responsible for the entire amount billed.      Precertification and Prior Authorizations:  If your physician has recommended that you have a procedure or additional testing performed the [facility]  Centralized Referral Team will contact your insurance carrier to obtain pre-certification or prior authorization.    You are strongly encouraged to contact your insurance carrier to verify that your procedure/test has been approved and is a COVERED benefit.  Although the [facility] Centralized Referral Team does its due diligence, the insurance carrier gives the disclaimer that \"Although the procedure is authorized, this does not guarantee payment.\"    Ultimately the patient is responsible for payment.   Thank you for your understanding in this matter.  Paperwork Completion:  If you require FMLA or disability paperwork for your recovery, please make sure to either drop it off or have it faxed to our office at [phone]. Be sure the form has your name and date of birth on it.  The form will be faxed to our Forms Department and they will complete it for you.  There is a 25$ fee for all forms that need to be filled out.  Please be aware there is a 10-14 day turnaround time.  You will need to sign a release of information (JAMES) form if your paperwork does not come with one.  You may call the Forms Department with any questions at [phone].  Their fax number is [phone].

## 2025-06-19 NOTE — H&P
Neurosurgery Clinic Visit  2025    Ceci VelizSharriLew PCP:  Albina Dolan MD    1970 MRN [id number]       CHIEF COMPLAINT:  Chief Complaint   Patient presents with    New Patient    Low Back Pain       HISTORY OF PRESENT ILLNESS:  Ceci Carroll is a(n) 55 year old female presents for evaluation.  Patient states in 2019 she started shooting pain down her right leg.  Over time it improved.  She was working can do much else.  She notes to have a hard time walking.  She went to the hospital.  She not having gallbladder removal as well as 2 abscesses drained.  It was difficult to walk.  She has had a dropfoot ever since this.  But she is Going generally see any medical attention/she states that she now cannot pain 4.  She has had issues for a long time.  She notes after her  having her child that she was bent over.  She went to see a chiropractor that helped her that.  She notes she is overtired she is fallen a few times.  She is recently seen Dr. Eason.  She did an EMG and MRI.  When she lays in bed there is a spot in her back you touch it hurts.  Currently she is just has limited pain her back is dull to 3-4.'s more on the left than the right.  When she tries to walk she drags her right foot.  She does little better with the rollator.  She also notes she is off balance with walking.  She can no longer manage stairs.  She is actually moving on secondary to a ranch house.  She notes her left leg is fine really no issues with that.  She has the right foot feels off and feels like it turns in at times.  But she has had the foot drop for many years.  Sitting she has low back pain with standing walking she does okay.  She is not had physical therapy or injections.  She is here for evaluation.    REVIEW OF SYSTEMS:  The 12 point checklist was reviewed and was negative except: See above    ALLERGIES:  Allergies[1]    MEDICATIONS:  Current Medications[2]    HISTORY:  Past Medical  History[3]  Past Surgical History[4]  Family History[5]  Ceci  reports that she has never smoked. She has never used smokeless tobacco. She reports that she does not drink alcohol and does not use drugs.    PHYSICAL EXAMINATION:  Vital Signs:  Pulse 80   General: The patient is in no acute distress.  HEENT: The head is atraumatic and normocephalic.  The eyes, ears, nose and throat are clear. The pupils are equal, round, and reactive to light.  Hearing is intact and symmetric.  Muscular:  Exam reveals normal bulk and tone.  Neurologic Exam: The patient is oriented to time, person and place.  Memory, attention span, language findings, and knowledge and insight into the problem appear intact and appropriate.  Cranial nerve Exam:  Visual fields are full.  The pupils are equal, round, and reactive to light.  The extraocular movements are intact.  Facial sensation in intact.  Facial symmetry and movement of the face is intact.  Hearing appears to be intact and symmetric.  Elevation of the palate appears symmetric as well.  Shoulder shrug is intact and symmetric.  The tongue is in the midline.    Strength:     Biceps Triceps Deltoids Wrist Extension Hand Intrinsics   Left 5/5 5/5 5/5 5/5 5/5   Right 5/5 5/5 5/5 5/5 5/5      Hip Flexion Hip Adduction Hip Abduction Knee Flexion Knee Extension Plantar- flexion Dorsi- flexion EHL   Left 5/5 5/5 5/5 5/5 5/5 5/5 5/5 5/5   Right 5/5 5/5 5/5 5/5 5/5 5/5 1/5 1/5     DTRs:   Biceps Triceps Brachio Patella Ankle   Left 2+ 2+ 2+ 2+ 2+   Right 2+ 2+ 2+ 2+ 2+     Clonus:  Absent.  Babinski:  Absent.  Whyte's:  Absent.  Romberg:  Negative.  Pronator drift: Absent.  Sensation:  Intact to light touch in all tested dermatomes in the upper and lower extremities.    Coordination:  Appears to be intact on finger-to-nose testing.  Gait and station: Got up with assistance was able to take a couple steps with unsteadiness  Spine: Deferred, patient stating her wheelchair for the most  part,    REVIEW OF STUDIES:  MRI lumbar spine shows multiple levels of stenosis and degeneration worst at L2-3 but was stenosis at 3 4 and 4 5      ASSESSMENT AND PLAN:  55-year-old female here with right foot drop and some other complaints  Her main issue is a foot drop  Is been going on since 2020 at least  Likely we could get function back in this  Unclear if this is from a radiculopathy versus her bad neuropathy  I would like her to get a CT scan of her lumbar spine as well as x-rays to look at her bony anatomy  Will start her on some physical therapy to help with her gait as well as see how she does with some strengthening of her leg  I did order her pain management evaluation for epidural steroid injection  I like to see how she does with 1 interlaminar injection just to see if this helps with her unsteadiness  She does have still gnosis is of varying degrees but she does not appear to have neurogenic claudication  I like to further better evaluate this  Reviewed her films in detail  All questions were answered  Patient and family appreciative        Time spent on counseling/coordination of care:  45 Minutes    Total time spent with patient:  45 Minutes      Rajan Delarosa MD   [facility]  6/19/2025  12:16 PM  Dictated but not proofread       [1] No Known Allergies  [2]   Current Outpatient Medications   Medication Sig Dispense Refill    PEG 3350-KCl-Na Bicarb-NaCl 420 g Oral Recon Soln Take as directed by physician 4000 mL 0    Potassium Chloride ER 10 MEQ Oral Tab CR Take 1 tablet (10 mEq total) by mouth daily. 90 tablet 1    amLODIPine 10 MG Oral Tab Take 1 tablet (10 mg total) by mouth daily. 90 tablet 1    levothyroxine 100 MCG Oral Tab Take 1 tablet, 100 mcg, daily on an empty stomach 30 minutes before breakfast. 90 tablet 1    atorvastatin 40 MG Oral Tab Take 1 tablet (40 mg total) by mouth nightly. 90 tablet 1    semaglutide (OZEMPIC, 0.25 OR 0.5 MG/DOSE,) 2 MG/3ML Subcutaneous  Solution Pen-injector Inject 0.25 mg into the skin once a week. 3 mL 1    insulin NPH & regular 70/30 (70-30) 100 Units/mL Subcutaneous Suspension 36 units in the am and 36 units in the pm with meals. 30 mL 1    Insulin Pen Needle (BD PEN NEEDLE MIGUELANGEL U/F) 32G X 4 MM Does not apply Misc Used to inject twice daily. 100 each 1    losartan 25 MG Oral Tab Take 1 tablet (25 mg total) by mouth daily. 90 tablet 1    Continuous Blood Gluc Sensor (FREESTYLE TERRY 3 SENSOR) Does not apply Misc 1 each every 14 (fourteen) days. 2 each 2    Continuous Blood Gluc  (FREESTYLE TERRY 3 READER) Does not apply Misc 1 each As Directed. Use with Terry 3 sensors to monitor blood glucose as directed. 1 each 0   [3]   Past Medical History:   Diabetes (HCC)    Diabetes mellitus (HCC)    Essential hypertension    Eye disorder    Hyperlipidemia    Hypertension    Non-healing non-surgical wound    Thyroid disease   [4]   Past Surgical History:  Procedure Laterality Date          Cataract      Cholecystectomy      Emg  2025    Insert intrauterine device  2017    Laparoscopic cholecystectomy     [5]   Family History  Problem Relation Age of Onset    Diabetes Mother     Heart Disorder Mother     Diabetes Sister     Stroke Sister     Diabetes Brother

## 2025-06-25 ENCOUNTER — OFFICE VISIT (OUTPATIENT)
Dept: PAIN CLINIC | Facility: CLINIC | Age: 55
End: 2025-06-25
Payer: MEDICARE

## 2025-06-25 ENCOUNTER — TELEPHONE (OUTPATIENT)
Dept: PAIN CLINIC | Facility: CLINIC | Age: 55
End: 2025-06-25

## 2025-06-25 VITALS — SYSTOLIC BLOOD PRESSURE: 124 MMHG | DIASTOLIC BLOOD PRESSURE: 64 MMHG | OXYGEN SATURATION: 97 % | HEART RATE: 85 BPM

## 2025-06-25 DIAGNOSIS — M48.062 LUMBAR STENOSIS WITH NEUROGENIC CLAUDICATION: Primary | ICD-10-CM

## 2025-06-25 DIAGNOSIS — M48.062 SPINAL STENOSIS OF LUMBAR REGION WITH NEUROGENIC CLAUDICATION: Primary | ICD-10-CM

## 2025-06-25 PROCEDURE — 99204 OFFICE O/P NEW MOD 45 MIN: CPT | Performed by: PHYSICIAN ASSISTANT

## 2025-06-25 NOTE — PROGRESS NOTES
Subjective:   Patient ID: Ceci Carroll is a 55 year old female.    Consult        History/Other:   Review of Systems  Current Medications[1]  Allergies:Allergies[2]    Objective:   Physical Exam  Constitutional:              Assessment & Plan:   No diagnosis found.    No orders of the defined types were placed in this encounter.      Meds This Visit:  Requested Prescriptions      No prescriptions requested or ordered in this encounter       Imaging & Referrals:  None      Location of Pain: back and feet    Date Pain Began: 2008          Work Related:   No        Receiving Work Comp/Disability:   Yes    Numeric Rating Scale:  Pain at Present:  4                                                                                                              (No Pain) 0  to  10 (Worst Pain)                 Minimum Pain:   1  Maximum Pain  6    Distribution of Pain:    bilateral    Quality of Pain:   numbness, throbbing, tingling, and cramping    Origin of Pain:    Disease related    Aggravating Factors:    Walking    Past Treatments for Current Pain Condition:   chiropractic care    Prior diagnostic testing for your pain:  MRI           [1]   Current Outpatient Medications   Medication Sig Dispense Refill    PEG 3350-KCl-Na Bicarb-NaCl 420 g Oral Recon Soln Take as directed by physician 4000 mL 0    Potassium Chloride ER 10 MEQ Oral Tab CR Take 1 tablet (10 mEq total) by mouth daily. 90 tablet 1    amLODIPine 10 MG Oral Tab Take 1 tablet (10 mg total) by mouth daily. 90 tablet 1    levothyroxine 100 MCG Oral Tab Take 1 tablet, 100 mcg, daily on an empty stomach 30 minutes before breakfast. 90 tablet 1    atorvastatin 40 MG Oral Tab Take 1 tablet (40 mg total) by mouth nightly. 90 tablet 1    semaglutide (OZEMPIC, 0.25 OR 0.5 MG/DOSE,) 2 MG/3ML Subcutaneous Solution Pen-injector Inject 0.25 mg into the skin once a week. 3 mL 1    insulin NPH & regular 70/30 (70-30) 100 Units/mL Subcutaneous Suspension 36 units in  the am and 36 units in the pm with meals. 30 mL 1    Insulin Pen Needle (BD PEN NEEDLE MIGUELANGEL U/F) 32G X 4 MM Does not apply Misc Used to inject twice daily. 100 each 1    losartan 25 MG Oral Tab Take 1 tablet (25 mg total) by mouth daily. 90 tablet 1    Continuous Blood Gluc Sensor (FREESTYLE TERRY 3 SENSOR) Does not apply Misc 1 each every 14 (fourteen) days. 2 each 2    Continuous Blood Gluc  (FREESTYLE TERRY 3 READER) Does not apply Misc 1 each As Directed. Use with Terry 3 sensors to monitor blood glucose as directed. 1 each 0   [2] No Known Allergies

## 2025-06-25 NOTE — TELEPHONE ENCOUNTER
Order Questions    Question Answer   Anesthesia Type Local   Provider Yossi   Location [facility] Procedure Lab   Procedure Lumbar MAILE   CPT (Hit enter after each entry) NJX INTERLAMINAR LMBR/SAC   Medical clearance requested (will send to Pain Navigator) No   Patient has Medicare coverage? Yes

## 2025-06-25 NOTE — PROGRESS NOTES
Patient: Ceci Carroll  Medical Record Number: [id number]  Site: [facility]  Referring Physician: Dr. Delarosa  PCP: Dr. Dolan    Dear Dr. Delarosa:    Thank you very much for requesting this consultation. I had the opportunity to evaluate and initiate care for your patient today, as per your request.    HISTORY OF CHIEF COMPLAINT:      Ceci Carroll is a 55 year old female, who complains of low back pain with unsteady gait and foot drop.    Patient is here today at the request of neurosurgery with pain in above-described distribution that began atraumatically 2019.  States that she had acute onset of LE pain, and while pain improved fairly rapidly, continued with weakness of the LE with drop foot.  States her pain continues to be relatively mild, though her weakness persists. She has been to physical therapy, to partial relief.  She was evaluated by neurology and was sent for MRI of the lumbar spine.  This did reveal multilevel severe stenosis (see below) and was evaluated by neurosurgery.  Prior to consideration more aggressive options, was sent for consideration of LESI.    VAS Pain Score:  1-6/10    Aggravating Factors: Relieving Factors:   Increased activity  Limiting ADLs     Past Treatment Attempted/Patient’s Response:  As above     Past Medical History[1]   Past Surgical History[2]   Family History[3]   Social Hx on file[4]   Current Medications:  Current Medications[5]     Functional Assessment: Patient reports that they are able to complete all of their ADL's such as eating, bathing, using the toilet, dressing and getting up from a bed or a chair independently.    Work History:  The patient currently is unemployed.    REVIEW OF SYSTEMS:   10 point review of systems is otherwise negative,unless otherwise in HPI.      Radiology/Lab Test Reviewed:  MRI L spine 4/20/25:    T12-L1: No disc herniation, spinal canal or neuroforaminal stenosis.   L1-L2: No disc herniation or  spinal canal or neuroforaminal stenosis.   L2-L3:  Large central disc herniation superimposed on a broad-based disc bulge is causing marked severe spinal canal stenosis.  Moderate bilateral neural foraminal stenosis.   L3-L4:  Left paracentral disc bulge/herniation is causing severe spinal canal stenosis along with severe left neural foraminal stenosis.   L4-L5:  Broad-based disc bulge with mild facet hypertrophic changes causing severe spinal canal stenosis.  Mild to moderate bilateral neural foraminal stenosis.   L5-S1:  Right paracentral disc bulge is effacing right lateral recess and impinging on the right S1 nerve root.  There is mild spinal canal stenosis along with moderate bilateral neural foraminal stenosis.         CBC:    Lab Results   Component Value Date    WBC 10.2 05/18/2019   No results found for: \"HEMOGLOBIN\"  Lab Results   Component Value Date    .0 05/18/2019       PHYSICAL EXAMIMATION   PHYSICAL EXAMINATION: Ceci Carroll is a 55 year old female who is observed sitting comfortably in a wheelchair in the exam room alert and oriented times three. She looks consistent with her stated age.    Ht Readings from Last 1 Encounters:   05/10/25 62\"     Wt Readings from Last 1 Encounters:   05/10/25 230 lb (104.3 kg)     The patient is well developed, well nourished, well muscled, obese. She is in wheelchair.         Inspection:  No acute distress      Lumbar/Sacral Integument:  Skin over lumbar sacral spine is intact without rashes, excoriations, lesions or erythema noted    Palpation:  See chart below:  Palpation of lumbar area Right (+ or -) Left (+ or -)   Lumbar facets - -   Lumbar paraspinals - -   Piriformis - -   SIJ - -   Trochanteric Bursa - -     Strength:  Strength deficits noted:  4/5 left foot dorsiflexion, 3/5 right foot dorsiflexion     Sensation:  No sensory deficits noted on bilateral lower extremities to light touch    Tests:  Test Right (+ or -) Left (+ or -)   SLR - -    J Carlos’s     Babinski     Clonus       HEAD/NECK: Head is normocephalic, neck supple  EYES: EOMI, JOSE  SKIN EXAM: Skin is intact, head, neck, trunk and arms/legs. No rashes, mottling or ulcerations.  LYMPH EXAM: There is no lymph edema in either lower extremity.  VASCULAR EXAM: Pedal pulses are normal bilaterally, with good distal perfusion. No clubbing or cyanosis.  ABDOMINAL EXAM: Abdomen is soft without masses palpated.  HEART: normal, regular, S1 and S2  LUNGS:CTA  MUSCULOSKELETAL: Smooth, pain-free ROM to bilat hips, ankles, and knees.     Do you have any known blood/bleeding disorders?  No  Does patient currently take blood thinners?   None  Does patient currently take any antibiotics?   No      Patient is currently on pain medications:  No  Reason pain medications are prescribed: N/A  Pain medications are prescribed by: N/A  [place] Prescription Monitoring review: N/A  DIRE: N/A  Treatment decision: N/A    MEDICAL DECISION MAKING:     Impression: Lumbar stenosis with neurogenic claudication, chronic foot drop    Low back and radiating lower extremity pain with more significant right foot drop (3/5) with left foot weakness with dorsiflexion (4/5).  Symptoms present for the past 5 to 6 years, and had found some improvement with physical therapy, though symptoms certainly persist.  She has been evaluated by neurosurgery, and MRI does reveal multilevel and severe stenosis, seemingly worse at L2-3, though certainly present at L3-4 and L4-5.  Prior to consideration of more rest of options, was sent for consideration of LESI to see if it improves nitrous her pain but perhaps gait imbalance and/or weakness.    Plan: LESI at her earliest convenience, risks and benefits of which were reviewed in detail.  Follow-up 2 to 3 weeks.  If she has largely insufficient relief, return to neurosurgery.    The patient indicates understanding of these issues and agrees to the plan.      Thank you very much.     Respectfully  Tha beard PA         [1]   Past Medical History:   Diabetes (HCC)    Diabetes mellitus (HCC)    Essential hypertension    Eye disorder    Hyperlipidemia    Hypertension    Non-healing non-surgical wound    Thyroid disease   [2]   Past Surgical History:  Procedure Laterality Date          Cataract      Cholecystectomy      Emg  2025    Insert intrauterine device  2017    Laparoscopic cholecystectomy     [3]   Family History  Problem Relation Age of Onset    Diabetes Mother     Heart Disorder Mother     Diabetes Sister     Stroke Sister     Diabetes Brother    [4]   Social History  Socioeconomic History    Marital status:    Tobacco Use    Smoking status: Never    Smokeless tobacco: Never   Vaping Use    Vaping status: Never Used   Substance and Sexual Activity    Alcohol use: Never    Drug use: Never    Sexual activity: Not Currently   Other Topics Concern    Caffeine Concern No    Exercise No    Seat Belt Yes    Special Diet No    Stress Concern No    Weight Concern Yes   [5]   Current Outpatient Medications   Medication Sig Dispense Refill    PEG 3350-KCl-Na Bicarb-NaCl 420 g Oral Recon Soln Take as directed by physician 4000 mL 0    Potassium Chloride ER 10 MEQ Oral Tab CR Take 1 tablet (10 mEq total) by mouth daily. 90 tablet 1    amLODIPine 10 MG Oral Tab Take 1 tablet (10 mg total) by mouth daily. 90 tablet 1    levothyroxine 100 MCG Oral Tab Take 1 tablet, 100 mcg, daily on an empty stomach 30 minutes before breakfast. 90 tablet 1    atorvastatin 40 MG Oral Tab Take 1 tablet (40 mg total) by mouth nightly. 90 tablet 1    semaglutide (OZEMPIC, 0.25 OR 0.5 MG/DOSE,) 2 MG/3ML Subcutaneous Solution Pen-injector Inject 0.25 mg into the skin once a week. 3 mL 1    insulin NPH & regular 70/30 (70-30) 100 Units/mL Subcutaneous Suspension 36 units in the am and 36 units in the pm with meals. 30 mL 1    Insulin Pen Needle (BD PEN NEEDLE MIGUELANGEL U/F) 32G X 4 MM Does not apply  Misc Used to inject twice daily. 100 each 1    losartan 25 MG Oral Tab Take 1 tablet (25 mg total) by mouth daily. 90 tablet 1    Continuous Blood Gluc Sensor (FREESTYLE TERRY 3 SENSOR) Does not apply Misc 1 each every 14 (fourteen) days. 2 each 2    Continuous Blood Gluc  (FREESTYLE TERRY 3 READER) Does not apply Misc 1 each As Directed. Use with Terry 3 sensors to monitor blood glucose as directed. 1 each 0

## 2025-06-25 NOTE — TELEPHONE ENCOUNTER
Patient advised of insurance approval to proceed with injections and is agreeable to scheduling.  pre-procedure instructions reviewed. Patient prefers Local sedation. Reviewed sedation instructions including No Fasting & No  Required. Patient has no medications to hold prior to procedure. Patient verbalized understanding of instructions, no further needs at this time.     Pre Procedure Instruction Sheet provided to patient at [facility]  PRE-PROCEDURE INSTRUCTIONS WITHOUT SEDATION    Procedure: LESI       Appointment Date: 07/15/2025  Check-In Time: 10:30 AM    Follow-Up Date/Time: 07/29/2025 @ 10:30 AM    Prior to the procedure:  Please update us prior to the procedure if you are experiencing any symptoms of infection such as cough, fever, chills, urinary symptoms, or have recently been prescribed antibiotics, have open wounds, have recently had surgery or dental procedures.    Day of Procedure:  **Drivers will be required for patients who receive prescriptions for Valium.    NO FASTING REQUIRED  Please bring your Insurance Card, Photo ID, List of Current Medications and Referral (if applicable) to your appointment.  Please park in the [facility] and follow the signs to the [facility].  Check in at [facility] ([address] [place] [address]) outpatient registration in the [facility].  Please note-No prescriptions will be written by Pain Clinic in OR on the day of procedure. If you require a refill of medications, please contact the office 48 hours prior to your procedure.  If you have an implanted Spinal Cord or Peripheral Nerve Stimulator: Please remember to turn device off for procedure.        Medication Hold:    Number of days you need to be off for the following medications:    Aggrenox 10 days   Agrylin (Anagrelide) 10 days  Brilinta (Ticagrelor) 7 days  Imbruvica (Ibrutinib) 3 days   Enbrel (Etanercept) 24 hours   Fragmin (Dalteparin) 24 hours   Pletal  (Cilostazol) 7 days  Effient (Prasugrel) 7 days  Pradaxa 10 days  Trental 7 days  Eliquis (Apixaban) 3 days  Xarelto (Rivaroxaban) 3 days  Lovenox (Enoxaparin) 24 hours  Aspirin  Greater than 81mg but less than 325mg   5 days  325mg and greater                  7 days  Coumadin       5 days  Procedure may be cancelled if INR is elevated.   Excedrin (with aspirin) 7 days  Plavix (Clopidogrel)                            7 days    NSAIDs: 24 hours preferred      Ibuprofen (Motrin, Advil, Vicoprofen), Naproxen (Naprosyn, Aleve), Piroxcam (Feldene), Meloxicam (Mobic), Oxaprozin (Daypro), Diclofenac (Voltaren), Indomethacin (Indocin), Etodolac (Lodine), Nabumetone (Relafen), Celebrex (Celecoxib)           HERBAL SUPPLEMENTS  5 days preferred  Fish oil, krill oil, Omega-3, Vascepa, Vitamin E, Turmeric, Garlic                       Insurance Authorization:   Most insurances are now requiring a preauthorization for all procedures.  In the event that your insurance does not authorize your procedure within 48 hours of the scheduled date, your procedure will be cancelled and rescheduled to a later date.  Please contact your insurance carrier to determine what your financial responsibility will be for the procedure(s).      Cancellation/Rescheduling Appointment:   In the event you need to cancel or reschedule your appointment, you must notify the office 24 hours prior.    Post-procedure instructions:        Please schedule a follow up visit within 2 to 4 weeks after your last procedure date   Please call our office with any questions or concerns before or after your procedure at  [phone].  If you are a diabetic, please increase the frequency of your glucose monitoring after the procedure as this may cause a temporary increase in your blood sugar.  Contact your primary care physician if your blood sugar rises as you may require some medication adjustment.  It is normal to have increased pain at injection site for up to 3-5  days after procedure, you can use heat or ice (20 minutes on 20 minutes off) for comfort.    **To hear a recorded version of these instructions, please call [phone] and follow the prompts.  **Para escuchar las instrucciones en Español, por favor de llamar el vivien [phone] opción 4.

## 2025-06-25 NOTE — PATIENT INSTRUCTIONS
Refill policies:    Allow 2-3 business days for refills; controlled substances may take longer.  Contact your pharmacy at least 5 days prior to running out of medication and have them send an electronic request or submit request through the “request refill” option in your [employer] account.  Refills are not addressed on weekends; covering physicians do not authorize routine medications on weekends.  No narcotics or controlled substances are refilled after noon on Fridays or by on call physicians.  By law, narcotics must be electronically prescribed.  A 30 day supply with no refills is the maximum allowed.  If your prescription is due for a refill, you may be due for a follow up appointment.  To best provide you care, patients receiving routine medications need to be seen at least once a year.  Patients receiving narcotic/controlled substance medications need to be seen at least once every 3 months.  In the event that your preferred pharmacy does not have the requested medication in stock (e.g. Backordered), it is your responsibility to find another pharmacy that has the requested medication available.  We will gladly send a new prescription to that pharmacy at your request.    Scheduling Tests:    If your physician has ordered radiology tests such as MRI or CT scans, please contact Central Scheduling at [phone] right away to schedule the test.  Once scheduled, the [facility] Centralized Referral Team will work with your insurance carrier to obtain pre-certification or prior authorization.  Depending on your insurance carrier, approval may take 3-10 days.  It is highly recommended patients assure they have received an authorization before having a test performed.  If test is done without insurance authorization, patient may be responsible for the entire amount billed.      Precertification and Prior Authorizations:  If your physician has recommended that you have a procedure or additional testing performed the [facility]  Centralized Referral Team will contact your insurance carrier to obtain pre-certification or prior authorization.    You are strongly encouraged to contact your insurance carrier to verify that your procedure/test has been approved and is a COVERED benefit.  Although the [facility] Centralized Referral Team does its due diligence, the insurance carrier gives the disclaimer that \"Although the procedure is authorized, this does not guarantee payment.\"    Ultimately the patient is responsible for payment.   Thank you for your understanding in this matter.  Paperwork Completion:  If you require FMLA or disability paperwork for your recovery, please make sure to either drop it off or have it faxed to our office at [phone]. Be sure the form has your name and date of birth on it.  The form will be faxed to our Forms Department and they will complete it for you.  There is a 25$ fee for all forms that need to be filled out.  Please be aware there is a 10-14 day turnaround time.  You will need to sign a release of information (JAMES) form if your paperwork does not come with one.  You may call the Forms Department with any questions at [phone].  Their fax number is [phone].

## 2025-06-26 ENCOUNTER — TELEPHONE (OUTPATIENT)
Dept: INTERNAL MEDICINE CLINIC | Facility: CLINIC | Age: 55
End: 2025-06-26

## 2025-06-26 DIAGNOSIS — Z23 NEED FOR HEPATITIS B VACCINATION: Primary | ICD-10-CM

## 2025-06-26 NOTE — TELEPHONE ENCOUNTER
Pt called to schedule NV for final Hep B shot   Future Appointments   Date Time Provider Department Center   6/27/2025  8:15 AM Maria Elena Craft APN EMGENDO [facility] [place]   7/8/2025  1:00 PM EH CT MAIN RM4 [facility]   7/9/2025 11:00 AM PF ESTHER RM1 PF MAMMO [place]   7/10/2025 12:30 PM Too Mann MD [facility] ECC SUB GI   7/29/2025 10:30 AM Tha Maurice PA ENIPain [facility] [place]   8/5/2025 10:00 AM Rajan Delarosa MD ENINAPER2 [facility] [place]   9/2/2025 11:45 AM EMG 08 NURSE EMG 8 [facility] [place]   9/16/2025 11:00 AM Jacque Allan MD EMGNEPHNAPER [facility] [place]   9/18/2025 11:35 AM Romy Puentes DO ENINAPER [facility] [place]

## 2025-06-27 ENCOUNTER — TELEMEDICINE (OUTPATIENT)
Facility: CLINIC | Age: 55
End: 2025-06-27
Payer: MEDICARE

## 2025-06-27 DIAGNOSIS — Z79.4 TYPE 2 DIABETES MELLITUS WITH DIABETIC NEUROPATHY, WITH LONG-TERM CURRENT USE OF INSULIN (HCC): Primary | ICD-10-CM

## 2025-06-27 DIAGNOSIS — E03.9 HYPOTHYROIDISM, UNSPECIFIED TYPE: ICD-10-CM

## 2025-06-27 DIAGNOSIS — E11.40 TYPE 2 DIABETES MELLITUS WITH DIABETIC NEUROPATHY, WITH LONG-TERM CURRENT USE OF INSULIN (HCC): Primary | ICD-10-CM

## 2025-06-27 PROBLEM — E11.9 TYPE 2 DIABETES MELLITUS (HCC): Status: RESOLVED | Noted: 2019-05-18 | Resolved: 2025-06-27

## 2025-06-27 PROCEDURE — 99215 OFFICE O/P EST HI 40 MIN: CPT

## 2025-06-27 PROCEDURE — 95251 CONT GLUC MNTR ANALYSIS I&R: CPT

## 2025-06-27 PROCEDURE — G2211 COMPLEX E/M VISIT ADD ON: HCPCS

## 2025-06-27 RX ORDER — LEVOTHYROXINE SODIUM 100 UG/1
TABLET ORAL
Qty: 90 TABLET | Refills: 1 | Status: SHIPPED | OUTPATIENT
Start: 2025-06-27

## 2025-06-27 RX ORDER — INSULIN ASPART 100 [IU]/ML
32 INJECTION, SUSPENSION SUBCUTANEOUS
COMMUNITY
Start: 2025-06-27

## 2025-06-27 RX ORDER — INSULIN ASPART 100 [IU]/ML
32 INJECTION, SUSPENSION SUBCUTANEOUS
Qty: 60 ML | Refills: 2 | Status: SHIPPED | OUTPATIENT
Start: 2025-06-27 | End: 2025-06-27

## 2025-06-27 RX ORDER — HYDROCHLOROTHIAZIDE 12.5 MG/1
1 CAPSULE ORAL
COMMUNITY

## 2025-06-27 NOTE — PROGRESS NOTES
[facility] Note - Telemedicine    Ceci Carroll verbally consents to a telehealth visit (video + audio) on 6/27/2025. The visit was conducted in a private room.   Patient understands and accepts financial responsibility for any deductible, co-insurance and/or co-pays associated with this service.    Subjective:   History of Present Illness  Ceci Carroll is a 55 year old female with diabetes and kidney disease who presents for follow-up on her diabetes management     Her diabetes is managed with 0.5 mg of Ozempic weekly and 34 units of 70/30 insulin twice daily- both through patient assistance program. She experiences diarrhea as a side effect of Ozempic, occurring on Sundays or Mondays after her Friday dose. Blood sugar levels are stable, but she experiences hypoglycemia on days with diarrhea, managed with sugary drinks or apples.      She is trying to get other health conditions under control-- undergoing transplant evaluation and has diabetic neuropathy with drop foot. She is scheduled for a back pain injection on July 19th and will receive an AFO on July 7th to aid mobility.    She is focused on controlling blood sugar and cholesterol to manage kidney disease. She has experienced slight weight loss since starting Ozempic. She has moved to a ranch-style house to improve mobility and reduce wheelchair use, aiming to walk more independently.    DM meds at office visit:   Diabetic Medications              semaglutide (OZEMPIC, 0.25 OR 0.5 MG/DOSE,) 2 MG/3ML Subcutaneous Solution Pen-injector Inject 0.25 mg into the skin once a week.        Taking 0.5mg weekly         insulin NPH & regular 70/30 (70-30) 100 Units/mL Subcutaneous Suspension 36 units in the am and 36 units in the pm with meals.      34u qAM, 34u qPM             Continuous Glucose Monitoring Interpretation  Ceci Carroll has undergone continuous glucose monitoring.  The blood glucose tracings were evaluated for two  weeks prior to office visit. Blood glucose tracings demonstrated no significant hyperglycemia. There were occasional hypoglycemia, particularly on Mon/Tuesdays after ozempic injectionduring the weeks of evaluation.        History/Other:    Allergies, PMH, SocHx and FHx reviewed and updated as appropriate in Epic on Current Medications[1]  Allergies[2]  Current Medications[3]  Past Medical History[4]  Family History[5]  Social history: Reviewed.      ROS/Exam    REVIEW OF SYSTEMS: Ten point review of systems has been performed and is otherwise negative and/or non-contributory, except as described above.     VITALS  There were no vitals filed for this visit.    There is no height or weight on file to calculate BMI.  Wt Readings from Last 6 Encounters:   05/10/25 230 lb (104.3 kg)   04/01/25 236 lb (107 kg)   03/31/25 225 lb (102.1 kg)   03/05/25 236 lb 8 oz (107.3 kg)   11/08/24 208 lb (94.3 kg)   09/05/24 220 lb (99.8 kg)       PHYSICAL EXAM  Limited due to telemedicine encounter    Constitutional Not in acute distress  Pulmonary: no wheezing heard  No coughing on the phone. Speaking in full sentences   Neurological:  Alert and oriented to person, place and time.   Psychiatric: Normal affect, mood and behavior appropriate    Labs/Imaging: Pertinent imaging reviewed.    Thyroid labs (if present in chart):  Recent Labs     11/02/24  1023 12/30/24  1101 04/01/25  1152   T4F 1.0 1.1 1.3   TSH 11.923* 6.130* 3.052        Thyroid ultrasound results (if present in chart):  No results found.    Overall glucose control:  Lab Results   Component Value Date    A1C 5.9 (A) 12/30/2024    A1C 6.6 (H) 09/05/2024    A1C 7.3 (H) 05/20/2024    A1C 8.4 (A) 11/22/2023    A1C >14.0 06/15/2019       Supplementary Documentation:   -Surveillance for Diabetes Complications & Risks  Foot exam/neuropathy: No data recorded    Retinopathy screening: Last Dilated Eye Exam: 09/25/24  Eye Exam shows Diabetic Retinopathy?: No    Lipid screening:    Lab Results   Component Value Date    LDL 40 12/30/2024    TRIG 64 12/30/2024   Cholesterol Meds: atorvastatin Tabs - 40 MG      Nephropathy screening:   Lab Results   Component Value Date    EGFRCR 15 (L) 12/30/2024    MICROALBCREA 1,445.3 (H) 11/02/2024   No results found for: \"CREAUR\", \"MICROALBUMIN\", \"MALBCREACALC\"      Blood pressure control:   BP Readings from Last 1 Encounters:   06/25/25 124/64   BP Meds: amLODIPine Tabs - 10 MG; losartan Tabs - 25 MG       Assessment & Plan:   Overview:   1. Type 2 diabetes mellitus with diabetic neuropathy, with long-term current use of insulin (HCC) (Primary)  Overview:  PMHx of Type 2 diabetes mellitus diagnosed age 27. 2/2024 cpeptide 3.9.       Initial HPI 11/2023- with Dr. House   Initially started on Metformin. She did endorse noncompliance at that time. She was started on insulin around her early 30s.   Has tried lantus, novolog. Currently on Novolin 70/30 50u BID  In 2015, she had a bad infection and stopped all her meds at that time; was hospitalized. Restarted therapy after discharge.  In 2019, A1c of 15% and noted slower gait and weakness. Diagnosed with groin infection and gangrenous GB. States she was very sick and after that she became very serious about her diabetes management.   She noted worsening neuropathy after 2019.     Recent updates:   - March 2025- started ozempic 0.25mg weekly (through patient assistance program)   - April 2025- increased to ozempic 0.5mg weekly      Orders:  -     GLUC MNTR CONT REC FROM NTRSTL TISS FLU PHYS I&R  -     Microalb/Creat Ratio, Random Urine; Future; Expected date: 06/27/2025  2. Hypothyroidism, unspecified type  Overview:  Last Assessment & Plan:    Formatting of this note might be different from the original.   Stable 4/19/22 tsh 3.58   Cont levothyroxine 88mcg daily   tsh with reflex to free t4    Orders:  -     Levothyroxine Sodium; Take 1 tablet, 100 mcg, daily on an empty stomach 30 minutes before breakfast.   Dispense: 90 tablet; Refill: 1  -     TSH and Free T4; Future; Expected date: 09/01/2025  Other orders  -     Discontinue: NovoLOG Mix 70/30 FlexPen; Inject 32 Units into the skin 2 (two) times daily before meals.  Dispense: 60 mL; Refill: 2      Assessment & Plan  Type 2 diabetes mellitus with diabetic neuropathy  Last A1c value was 5.9% done 12/30/2024. Goal <7%.  Limited in mobility thus no recent labs on file. GMI 6.2% on Claudia.   Diabetes is managed with Ozempic 0.5 mg weekly and NovoLog 70/30 insulin, 34 units in the morning and evening. Blood sugars are well-controlled. Diabetic neuropathy is present, and she is undergoing evaluation for drop foot. There is a plan to manage back pain with injections scheduled for July 19. AFO is expected on July 7. Shared decision to titrate insulin down to 32 units and possibly further to 30 units if blood sugars remain stable.    - Titrate NovoLog 70/30 insulin from 34u twice daily -->  to 32 units in the morning and evening.  - increase ozempic 0.5mg --> 1mg weekly, will order through [facility] program   - Consider further reduction to 30 units if blood sugars remain well-controlled.  - Monitor blood sugars closely with Claudia 3 plus  - upcoming injections for back pain, may cause hyperglycemia  - MAB ordered    Chronic kidney disease, unspecified stage  Chronic kidney disease is being monitored with well-controlled blood sugars and cholesterol levels to delay progression and avoid dialysis. Emphasis on maintaining control to prevent further kidney damage.  - continue follow up with nephrology, Dr. Allan  - SGLT2i CI given GFR  - ozempic, blood glucose control as above, also on ARB   - working with [facility] team     Diarrhea due to Ozempic  Diarrhea occurs after Ozempic administration, typically starting on Sunday or Monday after the Friday dose. It is associated with hypoglycemic episodes. Discussion about dietary influences, particularly high-fat foods, and the  absence of a gallbladder potentially contributing to symptoms.  - Monitor dietary intake, especially high-fat foods, to identify triggers for diarrhea.  - Consider keeping a food diary to correlate with diarrhea episodes.     Hypothyroidism, unspecified  Hypothyroidism is managed with levothyroxine 100 mcg daily. Recent labs in April were normal.  - Continue levothyroxine 100 mcg daily.  - Recheck thyroid labs before the next appointment with Dr. House. Labs ordered    Updated DM med list:   Diabetic Medications              NovoLOG Mix 70/30 FlexPen 100 UNIT/ML Susp Pen-injector (Insulin Aspart Prot & Aspart 70/30) (Taking) Inject 32 Units into the skin 2 (two) times daily before meals.    semaglutide (OZEMPIC, 0.25 OR 0.5 MG/DOSE,) 2 MG/3ML Subcutaneous Solution Pen-injector Inject 0.25 mg into the skin once a week.          See above header \"Supplementary Documentation\" for surveillance for diabetes complications & risks    Return in about 3 months (around 9/27/2025) for scheduled follow up with Dr. House.    A total of 40 minutes was spent today on reviewing CGM reports, obtaining history, reviewing pertinent labs, evaluating patient, providing multiple treatment options, reinforcing diet/exercise and compliance, and completing documentation.     ORLANDO Moody  Endocrinology, Diabetes & Metabolism   6/27/2025          The following individual(s) verbally consented to be recorded using ambient AI listening technology and understand that they can each withdraw their consent to this listening technology at any point by asking the clinician to turn off or pause the recording:    Patient name: Ceci KINSEY Glen          [1]    Continuous Glucose Sensor (FREESTYLE TERRY 3 PLUS SENSOR) Does not apply Misc 1 each every 15 (fifteen) days.      levothyroxine 100 MCG Oral Tab Take 1 tablet, 100 mcg, daily on an empty stomach 30 minutes before breakfast. 90 tablet 1    NovoLOG Mix 70/30 FlexPen 100 UNIT/ML  Susp Pen-injector (Insulin Aspart Prot & Aspart 70/30) Inject 32 Units into the skin 2 (two) times daily before meals.     [2] No Known Allergies  [3]   Current Outpatient Medications   Medication Sig Dispense Refill    Continuous Glucose Sensor (FREESTYLE TERRY 3 PLUS SENSOR) Does not apply Misc 1 each every 15 (fifteen) days.      levothyroxine 100 MCG Oral Tab Take 1 tablet, 100 mcg, daily on an empty stomach 30 minutes before breakfast. 90 tablet 1    NovoLOG Mix 70/30 FlexPen 100 UNIT/ML Susp Pen-injector (Insulin Aspart Prot & Aspart 70/30) Inject 32 Units into the skin 2 (two) times daily before meals.      PEG 3350-KCl-Na Bicarb-NaCl 420 g Oral Recon Soln Take as directed by physician 4000 mL 0    Potassium Chloride ER 10 MEQ Oral Tab CR Take 1 tablet (10 mEq total) by mouth daily. 90 tablet 1    amLODIPine 10 MG Oral Tab Take 1 tablet (10 mg total) by mouth daily. 90 tablet 1    atorvastatin 40 MG Oral Tab Take 1 tablet (40 mg total) by mouth nightly. 90 tablet 1    semaglutide (OZEMPIC, 0.25 OR 0.5 MG/DOSE,) 2 MG/3ML Subcutaneous Solution Pen-injector Inject 0.25 mg into the skin once a week. 3 mL 1    Insulin Pen Needle (BD PEN NEEDLE MIGUELANGEL U/F) 32G X 4 MM Does not apply Misc Used to inject twice daily. 100 each 1    losartan 25 MG Oral Tab Take 1 tablet (25 mg total) by mouth daily. 90 tablet 1    Continuous Blood Gluc  (FREESTYLE TERRY 3 READER) Does not apply Misc 1 each As Directed. Use with Terry 3 sensors to monitor blood glucose as directed. 1 each 0   [4]   Past Medical History:   Diabetes (HCC)    Diabetes mellitus (HCC)    Essential hypertension    Eye disorder    Hyperlipidemia    Hypertension    Non-healing non-surgical wound    Thyroid disease   [5]   Family History  Problem Relation Age of Onset    Diabetes Mother     Heart Disorder Mother     Diabetes Sister     Stroke Sister     Diabetes Brother

## 2025-06-27 NOTE — PATIENT INSTRUCTIONS
Return Visit:  With ORLANDO Moody: Return in about 3 months (around 9/27/2025) for scheduled follow up with Dr. House.      - complete your diabetes eye exam-- due Sept 2025. This exam is critical to preventing and treating eye conditions caused by diabetes that could potentially lead to vision loss. Once complete, please call your eye care provider and ask them to fax your latest report to our office. This will help us update our records. Our fax number is: [phone]     VISIT SUMMARY:  Today, we discussed your diabetes management, kidney disease, and transplant evaluation. We also addressed your diabetic neuropathy, back pain, and digestive issues related to Ozempic. Adjustments to your insulin dosage were made, and plans for upcoming treatments were confirmed.    YOUR PLAN:  TYPE 2 DIABETES MELLITUS WITH DIABETIC NEUROPATHY: Your diabetes is managed with Ozempic and NovoLog insulin. Your blood sugars are well-controlled, and you are being evaluated for drop foot due to diabetic neuropathy.  -Reduce NovoLog 70/30 insulin to 32 units in the morning and evening.  - increase ozempic 0.5mg --> 1mg weekly, will order through [facility] program   -Consider further reduction to 30 units if blood sugars remain well-controlled.  -Monitor blood sugars closely with Claudia.   -Attend back pain injection appointment on July 19.  -Get fitted for AFO on July 7.       DIARRHEA DUE TO OZEMPIC: You experience diarrhea after taking Ozempic, which is associated with hypoglycemia.  -Monitor your dietary intake, especially high-fat foods, to identify triggers for diarrhea.  -Consider keeping a food diary to correlate with diarrhea episodes.     HYPOTHYROIDISM, UNSPECIFIED: Your hypothyroidism is managed with levothyroxine, and recent labs were normal.  -Continue taking levothyroxine 100 mcg daily.  -Recheck thyroid labs before your next appointment with Dr. House.    Contains text generated by Fito     Updated diabetes  medication instructions from today:   Diabetic Medications              NovoLOG Mix 70/30 FlexPen 100 UNIT/ML Susp Pen-injector (Insulin Aspart Prot & Aspart 70/30) (Taking) Inject 32 Units into the skin 2 (two) times daily before meals.    semaglutide (OZEMPIC, 0.25 OR 0.5 MG/DOSE,) 2 MG/3ML Subcutaneous Solution Pen-injector Inject 0.25 mg into the skin once a week.            Lab Results   Component Value Date    A1C 5.9 (A) 12/30/2024    A1C 6.6 (H) 09/05/2024    A1C 7.3 (H) 05/20/2024    A1C 8.4 (A) 11/22/2023    A1C >14.0 06/15/2019        General follow up information:  Please let us know if you require any refills at least 1 week prior to your medication running out. If you do run out of medication, please call our office ASAP to request refills (do not wait until your follow up).   Please call our office if sugars at home are consistently greater than 250 or less than 70 for medication adjustment (do not wait until your follow up appointment).  Lab results and imaging will typically be reviewed at follow up appointments, or within 3-5 business days of ALL results being in if you do not have an appointment scheduled in the near future. Our office will contact you for any abnormal results requiring more urgent follow up or action.   The on-call pager is for urgent matters only. If you are a type 1 diabetic and run out of insulin after business hours 8AM-4PM, you may call the on-call pager for a refill to a 24 hour pharmacy.  If you have adrenal insufficiency and run out of steroids, you may call the on-call pager for a refill to a 24 hour pharmacy. All other refill requests should be requested during business hours.    Office phone number: [phone]; phones are open Monday-Friday 8:30-4:30.       HOW TO TREAT LOW BLOOD SUGAR (Hypoglycemia)  Low blood sugar= Less than 70, or if you start to have symptoms (below)  Symptoms: Shaking or trembling, fast heart rate, extreme hunger, sweating, confusion/difficulty  concentrating, dizziness.    How to treat a low blood sugar if you are able to eat/drink: The Rule of 15/15  If you are using continuous glucose monitor that says you are low, but you do not have any symptoms, verify on fingerstick that your blood sugar is actually low before treating.   Eat 15 grams of carbs (see examples below)  Check your blood sugar after 15 minutes. If it’s still below your target range, have another serving.   Repeat these steps until it’s in your target range. Once it’s in range, if you're nervous about your sugar going low again, have a protein source (ie, a spoonful of peanut butter).   If you have a CGM you want to look for how your arrow has changed. If you arrow is pointed up or sideways after 15 min, give your CGM more time OR check with a finger stick. Try not to eat more food until at least 15 min after the first BG check - otherwise you risk having a rebound high.  If you are experiencing symptoms and you are unable to check your blood glucose for any reason, treat the hypoglycemia.  If someone has a low blood sugar and is unconscious: Don’t hesitate to call 911. If someone is unconscious and glucagon is not available or someone does not know how to use it, call 911 immediately.     To treat a low, I recommend you carry with you easy, pre-portioned treatment for low blood sugars that are 15G of carbs:   - Children sized squeeze pouch applesauce (high fiber + carbs help prevent too high of a spike)  - Small children's sized juicebox- 15g carb --> 4oz juice box  - Glucose tablets from [employer], you can find them near diabetes supplies --> Note, you will need to eat 3-4 tablets to get to 15g of carbs  - Children sized fruit snack pack- look for one with 15 grams of total carbohydrate  - Choice of how to treat your low is important. Complex carbs, or foods that contain fats along with carbs (like chocolate) can slow the absorption of glucose and should NOT be used to treat an  emergency low

## 2025-06-29 DIAGNOSIS — I10 PRIMARY HYPERTENSION: ICD-10-CM

## 2025-06-30 RX ORDER — LOSARTAN POTASSIUM 25 MG/1
25 TABLET ORAL DAILY
Qty: 90 TABLET | Refills: 1 | Status: SHIPPED | OUTPATIENT
Start: 2025-06-30

## 2025-06-30 NOTE — TELEPHONE ENCOUNTER
LOV: 4/1/2025 with Dr. Dolan  RTC: 12 months  Last Relevant Labs: 4/1/2025  Filled: 12/23/2024    #90 with 1 refill    Future Appointments   Date Time Provider Department Center   7/8/2025  1:00 PM EH CT MAIN RM4 [facility]   7/9/2025 11:00 AM PF ESTHER RM1 PF MAMMO [place]   7/10/2025 12:30 PM Too Mann MD [facility] ECC SUB GI   7/29/2025 10:30 AM Tha Maurice PA ENIPain EMG [place]   8/5/2025 10:00 AM Rajan Delarosa MD ENINAPER2 EMG [place]   9/2/2025 11:45 AM EMG 08 NURSE EMG 8 EMG [place]   9/16/2025 11:00 AM Jacque Allan MD EMGNEPHNAPER EMG [place]   9/18/2025 11:35 AM Romy Puentes DO ENINAPER EM[facility] [place]   9/26/2025 10:30 AM Chayo House DO [id number] EMG [place]

## 2025-07-01 ENCOUNTER — TELEPHONE (OUTPATIENT)
Facility: CLINIC | Age: 55
End: 2025-07-01

## 2025-07-01 NOTE — TELEPHONE ENCOUNTER
Note from provider: Can we start a re-order with new prescription instructions for [facility] patient assistance program? For Ozempic 1mg weekly, and  Novolog 70/30 32u twice daily.  Can then put in my inbox and I'll sign.    Filled out form and placed for signing.

## 2025-07-01 NOTE — TELEPHONE ENCOUNTER
Confirmation received.     Placed in [facility] patient assistance program folder.

## 2025-07-02 NOTE — TELEPHONE ENCOUNTER
Received shipment of Novolog 70/30 from [email or web address] along with [employer] fine tip needles. Notified patient of arrival.

## 2025-07-03 NOTE — TELEPHONE ENCOUNTER
Patient presented to office and picked up pen needles and 70/30.    Signed packaging forms sent to scanning.    Closing this encounter.

## 2025-07-08 ENCOUNTER — TELEPHONE (OUTPATIENT)
Dept: NEUROLOGY | Facility: CLINIC | Age: 55
End: 2025-07-08

## 2025-07-08 ENCOUNTER — HOSPITAL ENCOUNTER (OUTPATIENT)
Dept: CT IMAGING | Facility: HOSPITAL | Age: 55
Discharge: HOME | End: 2025-07-08
Attending: NEUROLOGICAL SURGERY
Payer: MEDICARE

## 2025-07-08 ENCOUNTER — MED REC SCAN ONLY (OUTPATIENT)
Facility: CLINIC | Age: 55
End: 2025-07-08

## 2025-07-08 DIAGNOSIS — M48.061 LUMBAR STENOSIS WITHOUT NEUROGENIC CLAUDICATION: ICD-10-CM

## 2025-07-08 DIAGNOSIS — M21.371 FOOT DROP, RIGHT: ICD-10-CM

## 2025-07-08 PROCEDURE — 72131 CT LUMBAR SPINE W/O DYE: CPT | Performed by: NEUROLOGICAL SURGERY

## 2025-07-08 NOTE — TELEPHONE ENCOUNTER
Fax order received from [facility] for provider review and signature; Endorsed to the nurses bin.

## 2025-07-09 ENCOUNTER — TELEPHONE (OUTPATIENT)
Dept: INTERNAL MEDICINE CLINIC | Facility: CLINIC | Age: 55
End: 2025-07-09

## 2025-07-09 NOTE — TELEPHONE ENCOUNTER
Pt says that she has abdominal pain and her belly button is leaking white/clear liquid from a scar made from a surg done in 2014. Pt states the area is red and looks as if it is trying to open up again. Pt is concerned since she is a diabetic.    I did schedule pt for 7/18 for soonest appt w/ RP. Should pt be triaged? Please advise.

## 2025-07-09 NOTE — TELEPHONE ENCOUNTER
Spoke with patient to inform of PCP recommendations. Pt v/u. Pt requested a MCM be sent with general surgery info. Sent MCM.

## 2025-07-09 NOTE — TELEPHONE ENCOUNTER
She can continue with antibiotic ointment for today, based on tomorrow's appointment I may order oral antibiotics.  I would also recommend she call and schedule with General Surgery clinic for evaluation of the area, usually takes a few weeks to get an appointment so she should call now and ask for first available appointment:  [facility]  [address] [place] [address]  [phone]

## 2025-07-09 NOTE — TELEPHONE ENCOUNTER
Triaged patient for possible abdominal scar infection. Pt states that this started recently, this has happened before and pt had to have surgery. Pt states that her belly button is leaking white/clear fluid from the scar from a surgery done in 2014. The area is red and looks like it's a little open or trying to open up again. Before, pt would apply an antibiotic to it and it would stop. Pt has been staying more active now by taking walks and has been careful about what she does. Pt denies fevers.    This RN advised pt that if symptoms worsen to go to [facility], but did reschedule pt to be seen sooner. Pt will arrive 15 min early.     RP: Pt scheduled for tomorrow at 1:45PM, will arrive 15 min early. Any recs in the meantime?     Future Appointments   Date Time Provider Department Center   7/10/2025  1:45 PM Albina Dolan MD EMG 8 EMG [place]   7/11/2025 11:00 [facility]   7/18/2025  3:00 PM Albina Dolan MD EMG 8 [facility] [place]   7/29/2025 10:30 AM Tha Maurice PA ENIPain EMG [place]   8/5/2025 10:00 AM Rajan Delarosa MD ENINAPER2 EMG [place]   9/2/2025 11:45 AM EMG 08 NURSE EMG 8 EM[facility] [place]   9/16/2025 11:00 AM Jacque Allan MD EMGNEPHNAPER EMG [place]   9/18/2025 11:35 AM Romy Puentes DO ENINAPER EM[facility] [place]   9/26/2025 10:30 AM Chayo House DO [id number] EMG [place]

## 2025-07-10 ENCOUNTER — OFFICE VISIT (OUTPATIENT)
Dept: INTERNAL MEDICINE CLINIC | Facility: CLINIC | Age: 55
End: 2025-07-10
Payer: MEDICARE

## 2025-07-10 VITALS
SYSTOLIC BLOOD PRESSURE: 130 MMHG | DIASTOLIC BLOOD PRESSURE: 70 MMHG | HEART RATE: 89 BPM | TEMPERATURE: 98 F | RESPIRATION RATE: 16 BRPM | OXYGEN SATURATION: 99 %

## 2025-07-10 DIAGNOSIS — E11.40 TYPE 2 DIABETES MELLITUS WITH DIABETIC NEUROPATHY, WITH LONG-TERM CURRENT USE OF INSULIN (HCC): Chronic | ICD-10-CM

## 2025-07-10 DIAGNOSIS — Z79.4 TYPE 2 DIABETES MELLITUS WITH DIABETIC NEUROPATHY, WITH LONG-TERM CURRENT USE OF INSULIN (HCC): Chronic | ICD-10-CM

## 2025-07-10 DIAGNOSIS — N18.4 CKD (CHRONIC KIDNEY DISEASE) STAGE 4, GFR 15-29 ML/MIN (HCC): Chronic | ICD-10-CM

## 2025-07-10 DIAGNOSIS — L08.9 SKIN INFECTION: Primary | ICD-10-CM

## 2025-07-10 PROBLEM — F33.1 MODERATE EPISODE OF RECURRENT MAJOR DEPRESSIVE DISORDER (HCC): Chronic | Status: ACTIVE | Noted: 2022-04-19

## 2025-07-10 PROBLEM — I10 PRIMARY HYPERTENSION: Chronic | Status: ACTIVE | Noted: 2020-12-21

## 2025-07-10 PROBLEM — E11.21 DIABETIC NEPHROPATHY ASSOCIATED WITH TYPE 2 DIABETES MELLITUS (HCC): Chronic | Status: ACTIVE | Noted: 2022-03-04

## 2025-07-10 PROBLEM — E03.9 HYPOTHYROIDISM: Chronic | Status: ACTIVE | Noted: 2020-01-17

## 2025-07-10 PROBLEM — E78.00 HYPERCHOLESTEREMIA: Chronic | Status: ACTIVE | Noted: 2020-12-21

## 2025-07-10 PROCEDURE — 99214 OFFICE O/P EST MOD 30 MIN: CPT | Performed by: INTERNAL MEDICINE

## 2025-07-10 RX ORDER — AMOXICILLIN AND CLAVULANATE POTASSIUM 500; 125 MG/1; MG/1
1 TABLET, FILM COATED ORAL 2 TIMES DAILY
Qty: 20 TABLET | Refills: 0 | Status: SHIPPED | OUTPATIENT
Start: 2025-07-10 | End: 2025-07-11

## 2025-07-10 RX ORDER — DORZOLAMIDE HYDROCHLORIDE AND TIMOLOL MALEATE 20; 5 MG/ML; MG/ML
1 SOLUTION/ DROPS OPHTHALMIC 2 TIMES DAILY
COMMUNITY
Start: 2025-05-31

## 2025-07-10 NOTE — PROGRESS NOTES
Ceci Hackett is a 55 year old female.   HPI:     Patient presents to discuss acute issues.  She has been dealing with clear drainage from lower umbilical area for past 3 days.  She has some pain in the area as well.  No fevers/chills/night sweats. Has been using OTC triple antibiotic - did not help.  She notes she had similar issues/drainage in December - used OTC triple antibiotic then and symptoms resolved.  Had surgery/incision in that area in .    Other chronic issues:   DM II - following with Endocrinology; A1c of 5.9% in 2024.  CKD IV - following with Nephrology.  Last GFR was 15.    Past medical, family, surgical and social history were reviewed as listed in the chart, and are unchanged from previous visit.    REVIEW OF SYSTEMS:   GENERAL/ const: no fevers/chills, no unintentional weight loss  SKIN: abdominal wall lesion as per HPI  LUNGS: denies shortness of breath   CARDIOVASCULAR: denies chest pain  GI: mild lower abdominal pain; denies nausea/emesis/diarrhea constipation  : denies dysuria   NEURO: denies headaches  PSYCHIATRIC: denies issues  ENDOCRINE: no hot/cold intolerance  ALLERGY: Allergies[1]  PAST HISTORY:     Medications - Current[2]  Medical:  has a past medical history of Back pain, Decorative tattoo, Diabetes mellitus (HCC) (1998), Kidney failure, Non-healing non-surgical wound (2021), Thyroid disease (10/2019), and Wears glasses.  Surgical:  has a past surgical history that includes ; Laparoscopic cholecystectomy; cholecystectomy; EMG (2025); cataract; and insert intrauterine device (2017).  Family: family history includes Diabetes in her brother, mother, and sister; Heart Attack in her father and mother; Heart Disorder in her mother; Hypertension in her father; Stroke in her sister.  Social:  reports that she has never smoked. She has never used smokeless tobacco. She reports that she does not drink alcohol and does not use drugs.  Wt  Readings from Last 6 Encounters:   07/07/25 200 lb (90.7 kg)   05/10/25 230 lb (104.3 kg)   04/01/25 236 lb (107 kg)   03/31/25 225 lb (102.1 kg)   03/05/25 236 lb 8 oz (107.3 kg)   11/08/24 208 lb (94.3 kg)       EXAM:   /70 (BP Location: Left arm, Patient Position: Sitting, Cuff Size: adult)   Pulse 89   Temp 97.9 °F (36.6 °C) (Temporal)   Resp 16   SpO2 99%   Breastfeeding No   GENERAL: Alert and oriented, well developed, well nourished,in no apparent distress  SKIN: erythema, open lesion lower periumbilical area  HEENT: atraumatic, PERRLA, EOMI, normal lid and conjunctiva  LUNGS: clear to auscultation bilaterally, no wheezing/rubs  CARDIO: RRR without murmurs.  No clubbing, cyanosis or edema.  GI: soft non tender nondistended no hepatosplenomegaly, bowel sounds throughout  NEURO: CN II-XII intact, 5/5 strength all extremities  PSYCH: pleasant, appropriate mood and affect  ASSESSMENT AND PLAN:   1. Skin infection  2. Type 2 diabetes mellitus with diabetic neuropathy, with long-term current use of insulin ([facility])  3. CKD (chronic kidney disease) stage 4, GFR 15-29 ml/min ([facility])  Patient has prior history of abdominal surgery, had surgical scar in periumbilical area.  For the past 3 days she has had clear drainage from the area.  Mild pain around the area of erythema.  On examination she has erythema of skin, bit of an open wound.  No purulent drainage noted.  She has been using OTC triple antibiotic ointment.  She does have diabetes, however it is well controlled, last A1c was 5.9%.  Will start on oral Augmentin.  Will renal dose as she has CKD/last GFR was 15.  She has appointment with General Surgery next week for further evaluation of this abdominal wall lesion.  Advised her to go to the emergency department with any severe abdominal pain, any fevers/chills/sweats, and serosanguinous drainage from the lesion.    - amoxicillin clavulanate 500-125 MG Oral Tab; Take 1 tablet by mouth in the morning and 1  tablet before bedtime. Do all this for 10 days.  Dispense: 20 tablet; Refill: 0    Patient Care Team:  Albina Dolan MD as PCP - General (Internal Medicine)  Chayo House DO (ENDOCRINOLOGY)  The patient indicates understanding of these issues and agrees to the plan.  The patient is asked to return to clinic in April 2026 for follow up on chronic issues/Medicare Wellness Visit, or earlier if acute issues arise.    Albina Dolan MD           [1] No Known Allergies  [2]   Current Outpatient Medications:     dorzolamide-timolol 2-0.5 % Ophthalmic Solution, Place 1 drop into both eyes 2 (two) times daily., Disp: , Rfl:     amoxicillin clavulanate 500-125 MG Oral Tab, Take 1 tablet by mouth in the morning and 1 tablet before bedtime. Do all this for 10 days., Disp: 20 tablet, Rfl: 0    LOSARTAN 25 MG Oral Tab, Take 1 tablet by mouth once daily, Disp: 90 tablet, Rfl: 1    Continuous Glucose Sensor (FREESTYLE TERRY 3 PLUS SENSOR) Does not apply Misc, 1 each every 15 (fifteen) days., Disp: , Rfl:     levothyroxine 100 MCG Oral Tab, Take 1 tablet, 100 mcg, daily on an empty stomach 30 minutes before breakfast., Disp: 90 tablet, Rfl: 1    NovoLOG Mix 70/30 FlexPen 100 UNIT/ML Susp Pen-injector (Insulin Aspart Prot & Aspart 70/30), Inject 32 Units into the skin 2 (two) times daily before meals., Disp: , Rfl:     Potassium Chloride ER 10 MEQ Oral Tab CR, Take 1 tablet (10 mEq total) by mouth daily., Disp: 90 tablet, Rfl: 1    amLODIPine 10 MG Oral Tab, Take 1 tablet (10 mg total) by mouth daily., Disp: 90 tablet, Rfl: 1    atorvastatin 40 MG Oral Tab, Take 1 tablet (40 mg total) by mouth nightly., Disp: 90 tablet, Rfl: 1    semaglutide (OZEMPIC, 0.25 OR 0.5 MG/DOSE,) 2 MG/3ML Subcutaneous Solution Pen-injector, Inject 0.25 mg into the skin once a week. (Patient taking differently: Inject 0.5 mg into the skin once a week.), Disp: 3 mL, Rfl: 1    Insulin Pen Needle (BD PEN NEEDLE MIGUELANGEL U/F) 32G X 4 MM Does not apply Misc,  Used to inject twice daily., Disp: 100 each, Rfl: 1    Continuous Blood Gluc  (FREESTYLE TERRY 3 READER) Does not apply Misc, 1 each As Directed. Use with Terry 3 sensors to monitor blood glucose as directed., Disp: 1 each, Rfl: 0

## 2025-07-10 NOTE — PATIENT INSTRUCTIONS
- Start antibiotic tablets, Augmentin.  Take 1 tablet twice daily with food for the next 10 days.  We will do the lower 500 mg dose in light of your kidney levels.  - Take over the counter probiotic or eat a cup of yogurt once daily while on the antibiotics  - Keep appointment with the surgeon Dr. Mondragon for next week as scheduled  - Go to the emergency department with any worsening pain, any yellow/green/bloody discharge from the wound, any high fevers.    It was a pleasure seeing you in the clinic today.  Thank you for choosing the [facility] office for your healthcare needs. Please call at [phone] with any questions or concerns.    Albina Dolan MD

## 2025-07-11 ENCOUNTER — HOSPITAL ENCOUNTER (OUTPATIENT)
Dept: MAMMOGRAPHY | Facility: HOSPITAL | Age: 55
Discharge: HOME | End: 2025-07-11
Attending: INTERNAL MEDICINE
Payer: MEDICARE

## 2025-07-11 ENCOUNTER — PATIENT MESSAGE (OUTPATIENT)
Dept: INTERNAL MEDICINE CLINIC | Facility: CLINIC | Age: 55
End: 2025-07-11

## 2025-07-11 DIAGNOSIS — L08.9 SKIN INFECTION: ICD-10-CM

## 2025-07-11 DIAGNOSIS — Z12.31 ENCOUNTER FOR SCREENING MAMMOGRAM FOR MALIGNANT NEOPLASM OF BREAST: ICD-10-CM

## 2025-07-11 PROCEDURE — 77067 SCR MAMMO BI INCL CAD: CPT | Performed by: INTERNAL MEDICINE

## 2025-07-11 PROCEDURE — 77063 BREAST TOMOSYNTHESIS BI: CPT | Performed by: INTERNAL MEDICINE

## 2025-07-11 RX ORDER — AMOXICILLIN AND CLAVULANATE POTASSIUM 500; 125 MG/1; MG/1
1 TABLET, FILM COATED ORAL 2 TIMES DAILY
Qty: 20 TABLET | Refills: 0 | Status: SHIPPED | OUTPATIENT
Start: 2025-07-11 | End: 2025-07-21

## 2025-07-14 ENCOUNTER — OFFICE VISIT (OUTPATIENT)
Facility: LOCATION | Age: 55
End: 2025-07-14
Payer: MEDICARE

## 2025-07-14 VITALS
SYSTOLIC BLOOD PRESSURE: 144 MMHG | TEMPERATURE: 97 F | OXYGEN SATURATION: 98 % | DIASTOLIC BLOOD PRESSURE: 87 MMHG | HEART RATE: 88 BPM

## 2025-07-14 DIAGNOSIS — S31.109A OPEN WOUND OF ABDOMINAL WALL, INITIAL ENCOUNTER: Primary | ICD-10-CM

## 2025-07-14 NOTE — H&P
Patient ID: Ceci Hackett is a 55 year old female.    Chief Complaint   Patient presents with    New Patient     NP - had sx on her belly button for an infection in 2014, leaking on and off, now it leaks and hurts, it looks like it's opening again. On ABX - clear drainage. Slight smell. No fevers, nausea or vomiting.         HPI: Ceci Hackett is a 55 year old female presents for evaluation.  For the past few months, patient has noticed leaking from an umbilical wound.  She has significant surgical history for an I&D of a large abdominal abscess and wound VAC placement in 2014 at an outside facility.  After that, patient was doing well until recently when she noticed intermittent drainage.  She denies any fevers or chills.  She denies seeing any purulent material.  She has a significant medical history for diabetes and chronic kidney disease.  She reports not being on dialysis but is hoping for transplant.    Workup: None      Past Medical History  Past Medical History[1]    Past Surgical History  Past Surgical History[2]    Medications  Current Medications[3]    Allergies  Allergies[4]    Social History  History   Smoking Status    Never   Smokeless Tobacco    Never     History   Alcohol Use Never     History   Drug Use Unknown       Family History  Family History[5]    Review of Systems  Review of Systems   Constitutional: Negative.  Negative for chills and fever.   Respiratory: Negative.     Cardiovascular: Negative.    Gastrointestinal: Negative.        Exam  Vitals:    07/14/25 0842   BP: 144/87   Pulse: 88   Temp: 97.4 °F (36.3 °C)     Physical Exam  Constitutional:       Appearance: Normal appearance.      Comments: In wheelchair but uses a walker at home due to foot drop   Cardiovascular:      Rate and Rhythm: Normal rate.   Pulmonary:      Effort: Pulmonary effort is normal.   Abdominal:       Skin:     General: Skin is warm and dry.   Neurological:      Mental Status: She is alert and  oriented to person, place, and time.           Assessment/Plan  Assessment   Problem List Items Addressed This Visit    None  Visit Diagnoses         Open wound of abdominal wall, initial encounter    -  Primary    Relevant Orders    CT ABDOMEN+PELVIS(ALL W+WO)(CPT=74178)            Ceci Hackett is a 55 year old female with an open abdominal wound    On physical exam, patient appears to have excoriation of the top skin layer on her prior surgical site  There is no erythema or purulence  There is no underlying induration but patient is tender  I recommend obtaining CT of the abdomen pelvis to rule out any underlying pathology  Patient should continue local wound care with wet-to-dry to the area  Once CT scan is done, patient will follow-up  If CT is negative, will treat the area with silver nitrate and continue local wound care    Patient can call the office for any questions or concerns      Kelle Mondragon MD  General Surgery  [facility]     CC:  Albina Dolan MD      The 21st Century Cures Act makes medical notes like these available to patients in the interest of transparency. Please be advised this is a medical document. Medical documents are intended to carry relevant information, facts as evident, and the clinical opinion of the practitioner. The medical note is intended as peer to peer communication and may appear blunt or direct. It is written in medical language and may contain abbreviations or verbiage that are unfamiliar.    This note was prepared using Dragon Medical voice recognition dictation software. As a result, errors may occur. When identified, these errors have been corrected. While every attempt is made to correct errors during dictation, discrepancies may still exist.         [1]   Past Medical History:   Back pain    Decorative tattoo    Diabetes (HCC)    Diabetes mellitus (HCC)    Essential hypertension    Glaucoma    High blood pressure    High cholesterol     Hyperlipidemia    Kidney failure    Non-healing non-surgical wound    Thyroid disease    Wears glasses   [2]   Past Surgical History:  Procedure Laterality Date          Cataract      Cholecystectomy      [facility]  2025    Insert intrauterine device  2017    Laparoscopic cholecystectomy     [3]   Current Outpatient Medications   Medication Sig Dispense Refill    amoxicillin clavulanate 500-125 MG Oral Tab Take 1 tablet by mouth in the morning and 1 tablet before bedtime. Do all this for 10 days. 20 tablet 0    dorzolamide-timolol 2-0.5 % Ophthalmic Solution Place 1 drop into both eyes 2 (two) times daily.      LOSARTAN 25 MG Oral Tab Take 1 tablet by mouth once daily 90 tablet 1    Continuous Glucose Sensor (FREESTYLE TERRY 3 PLUS SENSOR) Does not apply Misc 1 each every 15 (fifteen) days.      levothyroxine 100 MCG Oral Tab Take 1 tablet, 100 mcg, daily on an empty stomach 30 minutes before breakfast. 90 tablet 1    NovoLOG Mix 70/30 FlexPen 100 UNIT/ML Susp Pen-injector (Insulin Aspart Prot & Aspart 70/30) Inject 32 Units into the skin 2 (two) times daily before meals.      Potassium Chloride ER 10 MEQ Oral Tab CR Take 1 tablet (10 mEq total) by mouth daily. 90 tablet 1    amLODIPine 10 MG Oral Tab Take 1 tablet (10 mg total) by mouth daily. 90 tablet 1    atorvastatin 40 MG Oral Tab Take 1 tablet (40 mg total) by mouth nightly. 90 tablet 1    semaglutide (OZEMPIC, 0.25 OR 0.5 MG/DOSE,) 2 MG/3ML Subcutaneous Solution Pen-injector Inject 0.25 mg into the skin once a week. (Patient taking differently: Inject 0.5 mg into the skin once a week.) 3 mL 1    Insulin Pen Needle (BD PEN NEEDLE MIGUELANGEL U/F) 32G X 4 MM Does not apply Misc Used to inject twice daily. 100 each 1    Continuous Blood Gluc  (FREESTYLE TERRY 3 READER) Does not apply Misc 1 each As Directed. Use with Terry 3 sensors to monitor blood glucose as directed. 1 each 0   [4] No Known Allergies  [5]   Family History  Problem Relation Age  of Onset    Heart Attack Father     Hypertension Father     Heart Attack Mother     Diabetes Mother     Heart Disorder Mother     Diabetes Sister     Stroke Sister     Diabetes Brother

## 2025-07-23 ENCOUNTER — HOSPITAL ENCOUNTER (OUTPATIENT)
Dept: CT IMAGING | Facility: HOSPITAL | Age: 55
Discharge: HOME | End: 2025-07-23
Attending: STUDENT IN AN ORGANIZED HEALTH CARE EDUCATION/TRAINING PROGRAM
Payer: MEDICARE

## 2025-07-23 ENCOUNTER — HOSPITAL ENCOUNTER (OUTPATIENT)
Dept: GENERAL RADIOLOGY | Facility: HOSPITAL | Age: 55
Discharge: HOME | End: 2025-07-23
Attending: NEUROLOGICAL SURGERY
Payer: MEDICARE

## 2025-07-23 DIAGNOSIS — M21.371 FOOT DROP, RIGHT: ICD-10-CM

## 2025-07-23 DIAGNOSIS — S31.109A OPEN WOUND OF ABDOMINAL WALL, INITIAL ENCOUNTER: ICD-10-CM

## 2025-07-23 DIAGNOSIS — M48.061 LUMBAR STENOSIS WITHOUT NEUROGENIC CLAUDICATION: ICD-10-CM

## 2025-07-23 PROCEDURE — 72114 X-RAY EXAM L-S SPINE BENDING: CPT | Performed by: NEUROLOGICAL SURGERY

## 2025-07-23 PROCEDURE — 74176 CT ABD & PELVIS W/O CONTRAST: CPT | Performed by: STUDENT IN AN ORGANIZED HEALTH CARE EDUCATION/TRAINING PROGRAM

## 2025-07-28 ENCOUNTER — OFFICE VISIT (OUTPATIENT)
Facility: LOCATION | Age: 55
End: 2025-07-28
Payer: MEDICARE

## 2025-07-28 VITALS — TEMPERATURE: 97 F | DIASTOLIC BLOOD PRESSURE: 84 MMHG | SYSTOLIC BLOOD PRESSURE: 131 MMHG | HEART RATE: 87 BPM

## 2025-07-28 DIAGNOSIS — S31.109D OPEN WOUND OF ABDOMINAL WALL, SUBSEQUENT ENCOUNTER: Primary | ICD-10-CM

## 2025-07-28 PROCEDURE — 99212 OFFICE O/P EST SF 10 MIN: CPT | Performed by: STUDENT IN AN ORGANIZED HEALTH CARE EDUCATION/TRAINING PROGRAM

## 2025-07-28 NOTE — PROGRESS NOTES
Patient ID: Ceci Hackett is a 55 year old female.    Chief Complaint   Patient presents with    Follow - Up     EP - 7/23 CT ABDOMEN+PELVIS F/U,pt states no new symptoms.        HPI: Ceci Hackett is a 55 year old female presents for follow-up.  Since last appointment, patient denies any new issues.  She states that her pain has resolved.  She has been dressing the wound with saline daily.  She has completed the course of antibiotics.  She denies any fevers or chills.    Workup:   CT ABDOMEN+PELVIS(CPT=74176)  Result Date: 7/23/2025  CONCLUSION: 1. Large pannus. No defined open wound. No gas or focal fluid collection in the anterior abdominal wall. 2. Low position of the rectum. Correlate for prolapse or lax pelvic floor. 3. Details as above. Continued clinical correlation recommended. Electronically Verified and Signed by Attending Radiologist: Vic Tejeda MD 7/23/2025 6:06 PM Workstation: [id number]      Past Medical History  Past Medical History[1]    Past Surgical History  Past Surgical History[2]    Medications  Current Medications[3]    Allergies  Allergies[4]    Social History  History   Smoking Status    Never   Smokeless Tobacco    Never     History   Alcohol Use Never     History   Drug Use Unknown       Family History  Family History[5]    Review of Systems  Review of Systems   Constitutional: Negative.  Negative for chills and fever.   Respiratory: Negative.     Cardiovascular: Negative.    Gastrointestinal: Negative.        Exam  Vitals:    07/28/25 0856   BP: 131/84   Pulse: 87   Temp: 97.2 °F (36.2 °C)     Physical Exam  Constitutional:       Appearance: Normal appearance.   Cardiovascular:      Rate and Rhythm: Normal rate.   Pulmonary:      Effort: Pulmonary effort is normal.   Abdominal:       Skin:     General: Skin is warm and dry.   Neurological:      Mental Status: She is alert and oriented to person, place, and time.           Assessment/Plan  Assessment   Problem List Items  Addressed This Visit    None  Visit Diagnoses         Open wound of abdominal wall, subsequent encounter    -  Primary            Ceci Hackett is a 55 year old female with an abdominal wall wound    CT images were reviewed personally by me and with the patient  CT shows no underlying fluid collection or cellulitis at the site of patient's open wound  On physical exam, wound is healing well with eschar in place, no surrounding erythema or drainage  I recommend patient continue local wound care  She can follow-up as needed  If she has any questions or concerns, she can contact my office    Thank you for letting me participate in the care of this patient      Kelle Mondragon MD  General Surgery  [facility]     CC:  Albina Dolan MD      The  Cures Act makes medical notes like these available to patients in the interest of transparency. Please be advised this is a medical document. Medical documents are intended to carry relevant information, facts as evident, and the clinical opinion of the practitioner. The medical note is intended as peer to peer communication and may appear blunt or direct. It is written in medical language and may contain abbreviations or verbiage that are unfamiliar.    This note was prepared using Dragon Medical voice recognition dictation software. As a result, errors may occur. When identified, these errors have been corrected. While every attempt is made to correct errors during dictation, discrepancies may still exist.         [1]   Past Medical History:   Back pain    Decorative tattoo    Diabetes (HCC)    Diabetes mellitus (HCC)    Essential hypertension    Glaucoma    High blood pressure    High cholesterol    Hyperlipidemia    Kidney failure    Non-healing non-surgical wound    Thyroid disease    Wears glasses   [2]   Past Surgical History:  Procedure Laterality Date          Cataract      Cholecystectomy      Emg  2025    Insert  intrauterine device  05/2017    Laparoscopic cholecystectomy     [3]   Current Outpatient Medications   Medication Sig Dispense Refill    dorzolamide-timolol 2-0.5 % Ophthalmic Solution Place 1 drop into both eyes 2 (two) times daily.      LOSARTAN 25 MG Oral Tab Take 1 tablet by mouth once daily 90 tablet 1    Continuous Glucose Sensor (FREESTYLE TERRY 3 PLUS SENSOR) Does not apply Misc 1 each every 15 (fifteen) days.      levothyroxine 100 MCG Oral Tab Take 1 tablet, 100 mcg, daily on an empty stomach 30 minutes before breakfast. 90 tablet 1    NovoLOG Mix 70/30 FlexPen 100 UNIT/ML Susp Pen-injector (Insulin Aspart Prot & Aspart 70/30) Inject 32 Units into the skin 2 (two) times daily before meals.      Potassium Chloride ER 10 MEQ Oral Tab CR Take 1 tablet (10 mEq total) by mouth daily. 90 tablet 1    amLODIPine 10 MG Oral Tab Take 1 tablet (10 mg total) by mouth daily. 90 tablet 1    atorvastatin 40 MG Oral Tab Take 1 tablet (40 mg total) by mouth nightly. 90 tablet 1    semaglutide (OZEMPIC, 0.25 OR 0.5 MG/DOSE,) 2 MG/3ML Subcutaneous Solution Pen-injector Inject 0.25 mg into the skin once a week. (Patient taking differently: Inject 0.5 mg into the skin once a week.) 3 mL 1    Insulin Pen Needle (BD PEN NEEDLE MIGUELANGEL U/F) 32G X 4 MM Does not apply Misc Used to inject twice daily. 100 each 1    Continuous Blood Gluc  (FREESTYLE TERRY 3 READER) Does not apply Misc 1 each As Directed. Use with Terry 3 sensors to monitor blood glucose as directed. 1 each 0   [4] No Known Allergies  [5]   Family History  Problem Relation Age of Onset    Heart Attack Father     Hypertension Father     Heart Attack Mother     Diabetes Mother     Heart Disorder Mother     Diabetes Sister     Stroke Sister     Diabetes Brother

## 2025-07-31 ENCOUNTER — TELEPHONE (OUTPATIENT)
Facility: CLINIC | Age: 55
End: 2025-07-31

## 2025-08-05 ENCOUNTER — OFFICE VISIT (OUTPATIENT)
Dept: SURGERY | Facility: CLINIC | Age: 55
End: 2025-08-05

## 2025-08-05 VITALS
SYSTOLIC BLOOD PRESSURE: 130 MMHG | DIASTOLIC BLOOD PRESSURE: 72 MMHG | HEART RATE: 80 BPM | BODY MASS INDEX: 33.74 KG/M2 | WEIGHT: 215 LBS | HEIGHT: 67 IN

## 2025-08-05 DIAGNOSIS — M48.061 LUMBAR STENOSIS WITHOUT NEUROGENIC CLAUDICATION: ICD-10-CM

## 2025-08-05 DIAGNOSIS — M21.371 FOOT DROP, RIGHT: Primary | ICD-10-CM

## 2025-08-05 PROCEDURE — 99213 OFFICE O/P EST LOW 20 MIN: CPT | Performed by: NEUROLOGICAL SURGERY

## 2025-08-07 ENCOUNTER — APPOINTMENT (OUTPATIENT)
Dept: GENERAL RADIOLOGY | Facility: HOSPITAL | Age: 55
End: 2025-08-07
Attending: ANESTHESIOLOGY

## 2025-08-07 ENCOUNTER — HOSPITAL ENCOUNTER (OUTPATIENT)
Facility: HOSPITAL | Age: 55
Setting detail: HOSPITAL OUTPATIENT SURGERY
Discharge: HOME | End: 2025-08-07
Attending: ANESTHESIOLOGY | Admitting: ANESTHESIOLOGY

## 2025-08-07 VITALS
BODY MASS INDEX: 39.56 KG/M2 | HEIGHT: 62 IN | DIASTOLIC BLOOD PRESSURE: 81 MMHG | TEMPERATURE: 98 F | OXYGEN SATURATION: 100 % | HEART RATE: 84 BPM | SYSTOLIC BLOOD PRESSURE: 157 MMHG | WEIGHT: 215 LBS | RESPIRATION RATE: 16 BRPM

## 2025-08-07 PROBLEM — M54.16 LUMBAR RADICULITIS: Status: ACTIVE | Noted: 2025-08-07

## 2025-08-07 LAB — GLUCOSE BLD-MCNC: 166 MG/DL (ref 70–99)

## 2025-08-07 PROCEDURE — 62323 NJX INTERLAMINAR LMBR/SAC: CPT | Performed by: ANESTHESIOLOGY

## 2025-08-07 RX ORDER — SODIUM CHLORIDE 9 MG/ML
INJECTION, SOLUTION INTRAMUSCULAR; INTRAVENOUS; SUBCUTANEOUS
Status: DISCONTINUED | OUTPATIENT
Start: 2025-08-07 | End: 2025-08-07

## 2025-08-07 RX ORDER — NALOXONE HYDROCHLORIDE 0.4 MG/ML
0.08 INJECTION, SOLUTION INTRAMUSCULAR; INTRAVENOUS; SUBCUTANEOUS AS NEEDED
Status: DISCONTINUED | OUTPATIENT
Start: 2025-08-07 | End: 2025-08-07

## 2025-08-07 RX ORDER — IOPAMIDOL 408 MG/ML
INJECTION, SOLUTION INTRATHECAL
Status: DISCONTINUED | OUTPATIENT
Start: 2025-08-07 | End: 2025-08-07

## 2025-08-07 RX ORDER — LIDOCAINE HYDROCHLORIDE 10 MG/ML
INJECTION, SOLUTION EPIDURAL; INFILTRATION; INTRACAUDAL; PERINEURAL
Status: DISCONTINUED | OUTPATIENT
Start: 2025-08-07 | End: 2025-08-07

## 2025-08-07 RX ORDER — DEXAMETHASONE SODIUM PHOSPHATE 10 MG/ML
INJECTION, SOLUTION INTRAMUSCULAR; INTRAVENOUS
Status: DISCONTINUED | OUTPATIENT
Start: 2025-08-07 | End: 2025-08-07

## 2025-08-08 ENCOUNTER — MED REC SCAN ONLY (OUTPATIENT)
Facility: CLINIC | Age: 55
End: 2025-08-08

## 2025-08-08 ENCOUNTER — TELEPHONE (OUTPATIENT)
Dept: PAIN CLINIC | Facility: CLINIC | Age: 55
End: 2025-08-08

## 2025-08-21 ENCOUNTER — OFFICE VISIT (OUTPATIENT)
Dept: PAIN CLINIC | Facility: CLINIC | Age: 55
End: 2025-08-21

## 2025-08-21 ENCOUNTER — LABORATORY ENCOUNTER (OUTPATIENT)
Dept: LAB | Facility: HOSPITAL | Age: 55
End: 2025-08-21
Attending: INTERNAL MEDICINE

## 2025-08-21 VITALS
OXYGEN SATURATION: 99 % | HEART RATE: 82 BPM | SYSTOLIC BLOOD PRESSURE: 112 MMHG | WEIGHT: 215 LBS | BODY MASS INDEX: 39 KG/M2 | DIASTOLIC BLOOD PRESSURE: 72 MMHG

## 2025-08-21 DIAGNOSIS — M21.371 FOOT DROP, RIGHT: ICD-10-CM

## 2025-08-21 DIAGNOSIS — Z01.818 PRE-OP TESTING: ICD-10-CM

## 2025-08-21 DIAGNOSIS — M48.062 SPINAL STENOSIS OF LUMBAR REGION WITH NEUROGENIC CLAUDICATION: Primary | ICD-10-CM

## 2025-08-21 LAB
ANION GAP SERPL CALC-SCNC: 12 MMOL/L (ref 0–18)
BUN BLD-MCNC: 32 MG/DL (ref 9–23)
CALCIUM BLD-MCNC: 9.4 MG/DL (ref 8.7–10.6)
CHLORIDE SERPL-SCNC: 110 MMOL/L (ref 98–112)
CO2 SERPL-SCNC: 19 MMOL/L (ref 21–32)
CREAT BLD-MCNC: 3.63 MG/DL (ref 0.55–1.02)
EGFRCR SERPLBLD CKD-EPI 2021: 14 ML/MIN/1.73M2 (ref 60–?)
GLUCOSE BLD-MCNC: 109 MG/DL (ref 70–99)
OSMOLALITY SERPL CALC.SUM OF ELEC: 299 MOSM/KG (ref 275–295)
POTASSIUM SERPL-SCNC: 4.6 MMOL/L (ref 3.5–5.1)
SODIUM SERPL-SCNC: 141 MMOL/L (ref 136–145)

## 2025-08-21 PROCEDURE — 36415 COLL VENOUS BLD VENIPUNCTURE: CPT

## 2025-08-21 PROCEDURE — 80048 BASIC METABOLIC PNL TOTAL CA: CPT

## 2025-08-21 PROCEDURE — 99214 OFFICE O/P EST MOD 30 MIN: CPT | Performed by: PHYSICIAN ASSISTANT

## 2025-08-22 ENCOUNTER — ANESTHESIA EVENT (OUTPATIENT)
Dept: ENDOSCOPY | Facility: HOSPITAL | Age: 55
End: 2025-08-22

## 2025-08-22 ENCOUNTER — TELEPHONE (OUTPATIENT)
Facility: CLINIC | Age: 55
End: 2025-08-22

## 2025-08-25 ENCOUNTER — TELEPHONE (OUTPATIENT)
Dept: PHYSICAL THERAPY | Facility: HOSPITAL | Age: 55
End: 2025-08-25

## 2025-08-28 ENCOUNTER — HOSPITAL ENCOUNTER (OUTPATIENT)
Facility: HOSPITAL | Age: 55
Setting detail: HOSPITAL OUTPATIENT SURGERY
Discharge: HOME | End: 2025-08-28
Attending: INTERNAL MEDICINE | Admitting: INTERNAL MEDICINE

## 2025-08-28 ENCOUNTER — ANESTHESIA (OUTPATIENT)
Dept: ENDOSCOPY | Facility: HOSPITAL | Age: 55
End: 2025-08-28

## 2025-08-28 VITALS
BODY MASS INDEX: 39.56 KG/M2 | WEIGHT: 215 LBS | SYSTOLIC BLOOD PRESSURE: 162 MMHG | HEIGHT: 62 IN | TEMPERATURE: 97 F | RESPIRATION RATE: 17 BRPM | HEART RATE: 75 BPM | DIASTOLIC BLOOD PRESSURE: 88 MMHG | OXYGEN SATURATION: 99 %

## 2025-08-28 DIAGNOSIS — Z01.818 PRE-OP TESTING: Primary | ICD-10-CM

## 2025-08-28 DIAGNOSIS — Z12.11 COLON CANCER SCREENING: ICD-10-CM

## 2025-08-28 LAB — GLUCOSE BLD-MCNC: 129 MG/DL (ref 70–99)

## 2025-08-28 PROCEDURE — 82962 GLUCOSE BLOOD TEST: CPT

## 2025-08-28 PROCEDURE — 88305 TISSUE EXAM BY PATHOLOGIST: CPT | Performed by: INTERNAL MEDICINE

## 2025-08-28 RX ORDER — SODIUM CHLORIDE, SODIUM LACTATE, POTASSIUM CHLORIDE, CALCIUM CHLORIDE 600; 310; 30; 20 MG/100ML; MG/100ML; MG/100ML; MG/100ML
INJECTION, SOLUTION INTRAVENOUS CONTINUOUS
Status: DISCONTINUED | OUTPATIENT
Start: 2025-08-28 | End: 2025-08-28

## 2025-08-28 RX ORDER — LIDOCAINE HYDROCHLORIDE 10 MG/ML
INJECTION, SOLUTION EPIDURAL; INFILTRATION; INTRACAUDAL; PERINEURAL AS NEEDED
Status: DISCONTINUED | OUTPATIENT
Start: 2025-08-28 | End: 2025-08-28 | Stop reason: SURG

## 2025-08-28 RX ORDER — NICOTINE POLACRILEX 4 MG
15 LOZENGE BUCCAL
Status: DISCONTINUED | OUTPATIENT
Start: 2025-08-28 | End: 2025-08-28

## 2025-08-28 RX ORDER — NICOTINE POLACRILEX 4 MG
30 LOZENGE BUCCAL
Status: DISCONTINUED | OUTPATIENT
Start: 2025-08-28 | End: 2025-08-28

## 2025-08-28 RX ORDER — DEXTROSE MONOHYDRATE 25 G/50ML
50 INJECTION, SOLUTION INTRAVENOUS
Status: DISCONTINUED | OUTPATIENT
Start: 2025-08-28 | End: 2025-08-28

## 2025-08-28 RX ADMIN — SODIUM CHLORIDE, SODIUM LACTATE, POTASSIUM CHLORIDE, CALCIUM CHLORIDE: 600; 310; 30; 20 INJECTION, SOLUTION INTRAVENOUS at 12:00:00

## 2025-08-28 RX ADMIN — LIDOCAINE HYDROCHLORIDE 50 MG: 10 INJECTION, SOLUTION EPIDURAL; INFILTRATION; INTRACAUDAL; PERINEURAL at 12:05:00

## (undated) DEVICE — PACK PAIN MGMT

## (undated) DEVICE — GLOVE SUR 7.5 SENSICARE PIP WHT PWD F

## (undated) DEVICE — Device

## (undated) DEVICE — NEEDLE HPO 30GA .5IN REG WALL REG BVL LL HUB DEHP-FR STRL LF

## (undated) DEVICE — CANNULA OPHTHALMIC 25GA .8MM SHORT SFTP GRSHBR STRL DISP

## (undated) DEVICE — KIT MFLD FOR SPEC COLL

## (undated) DEVICE — PACK VITRTM 25GA TOTAL + HYPERVIT 20000 CPM BVL

## (undated) DEVICE — CANNULA NSL AD W/ FLTR 14FT O2/CO2

## (undated) DEVICE — KIT VLV 5 PC AIR H2O SUCT BX ENDOGATOR CONN

## (undated) DEVICE — BANDAGE ADH 1INX3IN NAT FAB N ADH PD CURAD

## (undated) DEVICE — ELECTRODE EKG W3.5XL4CM ABRAD W1.25XL1.5IN

## (undated) DEVICE — KIT CUSTOM ENDOPROCEDURE STERIS

## (undated) DEVICE — MARKER SKIN 2 TIP

## (undated) DEVICE — TOWEL SURG 26X15IN BLUE TIBURON NABSB DRAPE ADH STRIP STRL

## (undated) DEVICE — STRIP 4X.5IN STRSTRP POLY REINFORCE SKNCLS WHT STRL LF

## (undated) DEVICE — GLOVE SURG 7.5 PROTEXIS PI LF CRM PF BEAD CUFF STRL PLISPRN

## (undated) DEVICE — SYRINGE 10ML SLIP TIP LOSS OF RESIST PLAS

## (undated) DEVICE — IMPLANTABLE DEVICE: Type: IMPLANTABLE DEVICE | Status: NON-FUNCTIONAL

## (undated) DEVICE — DRAPE SHWR CUP EQUIPMENT

## (undated) DEVICE — GOWN SURG XL L3 NONREINFORCE SET IN SLV STRL LF DISP BLUE

## (undated) DEVICE — REMOVER LOT 4OZ N IRRIG UNSCNT SFT MOIST LIQ

## (undated) DEVICE — PROBE LSR 6MM 25GA PUREPOINT 40D RTNA NTNL FLX TP STRL DISP

## (undated) DEVICE — SOLUTION ANTIFOG .212OZ FOAM PAD RADOPQ ADH BACK NTOX

## (undated) DEVICE — SNARE OVL ROT 10MM  230CM CLD ONLY

## (undated) DEVICE — CANISTER SUCT 1200CC LID BLU HRD ADPT AUTO

## (undated) DEVICE — NEEDLE HPO 25GA 1.5IN THNWL REG BVL LL PIVOT SHLD MECH

## (undated) DEVICE — GLOVE SUR 6.5 SENSICARE PIP WHT PWD F

## (undated) NOTE — Clinical Note
Can we start a re-order with new prescription instructions for [facility] patient assistance program? For Ozempic 1mg weekly, and  Novolog 70/30 32u twice daily.  Can then put in my inbox and I'll sign. Thank you!

## (undated) NOTE — Clinical Note
Can we do online ozempic patient assistance program for her? She doesn't have any drug coverage, only Medicare part B

## (undated) NOTE — Clinical Note
CRISTIAN, she's going low overnight quite a bit - I had her read her report to me from the reader and she's at 10% lows all after 10 pm so I reduced the evening dose of Novolin, I also scheduled her to follow up with Karlie sooner next week since her appointment with you is on November

## (undated) NOTE — ED AVS SNAPSHOT
Danyelle Joshi   MRN: [id number]    Department:  [facility] Emergency Department   Date of Visit:  5/18/2019           Disclosure     Insurance plans vary and the physician(s) referred by the ER may not be covered by your plan.  Please conta CARE PHYSICIAN AT ONCE OR RETURN IMMEDIATELY TO THE EMERGENCY DEPARTMENT. If you have been prescribed any medication(s), please fill your prescription right away and begin taking the medication(s) as directed.   If you believe that any of the medications

## (undated) NOTE — LETTER
Ceci Carroll, :1970    CONSENT FOR PROCEDURE/SEDATION    1. I authorize the performance upon Ceci Carroll  the following: Intrauterine Device Removal    2. I authorize Dr. Gaby Hogue MD (and whomever is designated as the doctor’s assistant), to perform the above-mentioned procedures.    3. If any unforeseen conditions arise during this procedure calling for additional  procedures, operations, or medications (including anesthesia and blood transfusion), I further request and authorize the doctor to do whatever he/she deems advisable in my interest.    4. I consent to the taking and reproduction of any photographs in the course of this procedure for professional purposes.    5. I consent to the administration of such sedation as may be considered necessary or advisable by the physician responsible for this service, with the exception of ______________________________________________________    6. I have been informed by my doctor of the nature and purpose of this procedure sedation, possible alternative methods of treatment, risk involved and possible complications.    7. If I have a Do Not Resuscitate (DNR) order in place, the physician and I (or the individual authorized to consent on my behalf) will discuss and agree as to whether the Do Not Resuscitate (DNR) order will remain in effect or will be discontinued during the performance of the procedure and the applicable recovery period. If the Do Not Resuscitate (DNR) order is discontinued and is to be reinstated following the procedure/recovery period, the physician will determine when the applicable recovery period ends for purposes of reinstating the Do Not Resuscitate (DNR) order.    Signature of Patient:_______________________________________________    Signature of person authorized to consent for patient:  _______________________________________________________________    Relationship to patient:  ____________________________________________    Witness: _________________________________________ Date:___________     Physician Signature: _______________________________ Date:___________